# Patient Record
Sex: FEMALE | Race: BLACK OR AFRICAN AMERICAN | Employment: UNEMPLOYED | URBAN - METROPOLITAN AREA
[De-identification: names, ages, dates, MRNs, and addresses within clinical notes are randomized per-mention and may not be internally consistent; named-entity substitution may affect disease eponyms.]

---

## 2017-03-06 DIAGNOSIS — I10 HTN, GOAL BELOW 130/80: ICD-10-CM

## 2017-03-06 RX ORDER — METOPROLOL TARTRATE 25 MG/1
TABLET, FILM COATED ORAL
Qty: 180 TAB | Refills: 0 | Status: SHIPPED | OUTPATIENT
Start: 2017-03-06 | End: 2017-07-11 | Stop reason: SDUPTHER

## 2017-04-12 DIAGNOSIS — G57.93 NEUROPATHIC PAIN OF BOTH FEET: ICD-10-CM

## 2017-04-13 RX ORDER — GABAPENTIN 100 MG/1
100 CAPSULE ORAL 3 TIMES DAILY
Qty: 90 CAP | Refills: 2 | Status: SHIPPED | OUTPATIENT
Start: 2017-04-13 | End: 2019-08-14 | Stop reason: ALTCHOICE

## 2017-05-11 DIAGNOSIS — I10 ESSENTIAL HYPERTENSION, MALIGNANT: ICD-10-CM

## 2017-05-11 RX ORDER — LISINOPRIL AND HYDROCHLOROTHIAZIDE 20; 25 MG/1; MG/1
1 TABLET ORAL DAILY
Qty: 30 TAB | Refills: 0 | Status: SHIPPED | OUTPATIENT
Start: 2017-05-11 | End: 2019-07-16 | Stop reason: SDUPTHER

## 2017-07-11 DIAGNOSIS — I10 HTN, GOAL BELOW 130/80: ICD-10-CM

## 2017-07-11 NOTE — TELEPHONE ENCOUNTER
Pt would like a rx for metoprolol (LOPRESSOR) 25 mg tablet   Pt ph number is [phone]        [facility] [id number] IN [employer] - Sabina REMY

## 2017-07-12 RX ORDER — METOPROLOL TARTRATE 25 MG/1
TABLET, FILM COATED ORAL
Qty: 180 TAB | Refills: 0 | Status: SHIPPED | OUTPATIENT
Start: 2017-07-12 | End: 2017-10-12 | Stop reason: SDUPTHER

## 2017-09-14 DIAGNOSIS — Z86.73 S/P STROKE DUE TO CEREBROVASCULAR DISEASE: ICD-10-CM

## 2017-09-14 DIAGNOSIS — E78.00 HYPERCHOLESTEREMIA: ICD-10-CM

## 2017-09-14 DIAGNOSIS — I69.354 HEMIPARESIS AFFECTING LEFT SIDE AS LATE EFFECT OF CEREBROVASCULAR ACCIDENT (HCC): ICD-10-CM

## 2017-09-17 RX ORDER — SIMVASTATIN 10 MG/1
TABLET, FILM COATED ORAL
Qty: 30 TAB | Refills: 0 | Status: SHIPPED | OUTPATIENT
Start: 2017-09-17 | End: 2017-10-22 | Stop reason: SDUPTHER

## 2017-10-12 DIAGNOSIS — I10 HTN, GOAL BELOW 130/80: ICD-10-CM

## 2017-10-15 RX ORDER — METOPROLOL TARTRATE 25 MG/1
TABLET, FILM COATED ORAL
Qty: 60 TAB | Refills: 0 | Status: SHIPPED | OUTPATIENT
Start: 2017-10-15 | End: 2019-07-16 | Stop reason: SDUPTHER

## 2017-10-22 DIAGNOSIS — I69.354 HEMIPARESIS AFFECTING LEFT SIDE AS LATE EFFECT OF CEREBROVASCULAR ACCIDENT (HCC): ICD-10-CM

## 2017-10-22 DIAGNOSIS — E78.00 HYPERCHOLESTEREMIA: ICD-10-CM

## 2017-10-22 DIAGNOSIS — Z86.73 S/P STROKE DUE TO CEREBROVASCULAR DISEASE: ICD-10-CM

## 2017-10-24 RX ORDER — SIMVASTATIN 10 MG/1
TABLET, FILM COATED ORAL
Qty: 30 TAB | Refills: 0 | Status: SHIPPED | OUTPATIENT
Start: 2017-10-24 | End: 2019-07-16 | Stop reason: SDUPTHER

## 2017-12-20 DIAGNOSIS — I69.354 HEMIPARESIS AFFECTING LEFT SIDE AS LATE EFFECT OF CEREBROVASCULAR ACCIDENT (HCC): ICD-10-CM

## 2017-12-20 DIAGNOSIS — I10 HTN, GOAL BELOW 130/80: ICD-10-CM

## 2017-12-20 DIAGNOSIS — E78.00 HYPERCHOLESTEREMIA: ICD-10-CM

## 2017-12-20 DIAGNOSIS — Z86.73 S/P STROKE DUE TO CEREBROVASCULAR DISEASE: ICD-10-CM

## 2017-12-21 RX ORDER — CLONIDINE HYDROCHLORIDE 0.2 MG/1
TABLET ORAL
Qty: 90 TAB | Refills: 3 | Status: SHIPPED | OUTPATIENT
Start: 2017-12-21 | End: 2019-07-16 | Stop reason: SDUPTHER

## 2019-07-16 ENCOUNTER — OFFICE VISIT (OUTPATIENT)
Dept: FAMILY MEDICINE CLINIC | Age: 64
End: 2019-07-16

## 2019-07-16 VITALS
TEMPERATURE: 99.3 F | SYSTOLIC BLOOD PRESSURE: 186 MMHG | OXYGEN SATURATION: 97 % | HEART RATE: 100 BPM | DIASTOLIC BLOOD PRESSURE: 100 MMHG | RESPIRATION RATE: 20 BRPM | BODY MASS INDEX: 22.5 KG/M2 | HEIGHT: 60 IN | WEIGHT: 114.6 LBS

## 2019-07-16 DIAGNOSIS — I10 HTN, GOAL BELOW 140/90: ICD-10-CM

## 2019-07-16 DIAGNOSIS — I10 ESSENTIAL HYPERTENSION, MALIGNANT: ICD-10-CM

## 2019-07-16 DIAGNOSIS — E11.9 DIABETES MELLITUS TYPE 2, CONTROLLED (HCC): ICD-10-CM

## 2019-07-16 DIAGNOSIS — E78.00 HYPERCHOLESTEREMIA: ICD-10-CM

## 2019-07-16 DIAGNOSIS — Z12.39 SCREENING FOR BREAST CANCER: Primary | ICD-10-CM

## 2019-07-16 DIAGNOSIS — I63.89 CEREBROVASCULAR ACCIDENT (CVA) DUE TO OTHER MECHANISM (HCC): ICD-10-CM

## 2019-07-16 DIAGNOSIS — Z13.820 SCREENING FOR OSTEOPOROSIS: ICD-10-CM

## 2019-07-16 DIAGNOSIS — E11.40 TYPE 2 DIABETES MELLITUS WITH DIABETIC NEUROPATHY, WITHOUT LONG-TERM CURRENT USE OF INSULIN (HCC): ICD-10-CM

## 2019-07-16 DIAGNOSIS — I69.354 HEMIPARESIS AFFECTING LEFT SIDE AS LATE EFFECT OF CEREBROVASCULAR ACCIDENT (HCC): ICD-10-CM

## 2019-07-16 DIAGNOSIS — I10 HTN, GOAL BELOW 130/80: ICD-10-CM

## 2019-07-16 DIAGNOSIS — Z86.73 S/P STROKE DUE TO CEREBROVASCULAR DISEASE: ICD-10-CM

## 2019-07-16 PROBLEM — I63.9 STROKE (HCC): Status: ACTIVE | Noted: 2019-07-16

## 2019-07-16 LAB — HBA1C MFR BLD HPLC: 6.3 %

## 2019-07-16 RX ORDER — METOPROLOL TARTRATE 25 MG/1
TABLET, FILM COATED ORAL
Qty: 60 TAB | Refills: 0 | Status: SHIPPED | OUTPATIENT
Start: 2019-07-16 | End: 2019-08-13 | Stop reason: SDUPTHER

## 2019-07-16 RX ORDER — CLONIDINE HYDROCHLORIDE 0.2 MG/1
TABLET ORAL
Qty: 30 TAB | Refills: 1 | Status: SHIPPED | OUTPATIENT
Start: 2019-07-16 | End: 2019-07-23 | Stop reason: SDUPTHER

## 2019-07-16 RX ORDER — METFORMIN HYDROCHLORIDE 500 MG/1
250 TABLET ORAL 2 TIMES DAILY WITH MEALS
Qty: 90 TAB | Refills: 3 | Status: SHIPPED | OUTPATIENT
Start: 2019-07-16 | End: 2019-07-23 | Stop reason: SDUPTHER

## 2019-07-16 RX ORDER — LISINOPRIL AND HYDROCHLOROTHIAZIDE 20; 25 MG/1; MG/1
1 TABLET ORAL DAILY
Qty: 30 TAB | Refills: 0 | Status: SHIPPED | OUTPATIENT
Start: 2019-07-16 | End: 2019-07-23 | Stop reason: SDUPTHER

## 2019-07-16 RX ORDER — SIMVASTATIN 10 MG/1
TABLET, FILM COATED ORAL
Qty: 30 TAB | Refills: 0 | Status: SHIPPED | OUTPATIENT
Start: 2019-07-16 | End: 2019-07-23 | Stop reason: SDUPTHER

## 2019-07-16 NOTE — PROGRESS NOTES
HISTORY OF PRESENT ILLNESS  Logan Mcknight is a 66 y.o. female. HPI   Patient present with history of stroke couple years ago with left-sided hemiparesis secondary to cerebrovascular accident in the past diabetic state last A1c was controlled diabetic neuropathy with tingling sensation last visit was in 2016 patient states that she has not taken any of her medication she ran out and she has not been able to afford the cost currently on no medication whatsoever, patient also has had history of rheumatoid arthritis with bilateral hand deformity stating that she wants to see the same medical doctor that she sees the daughter pain is 4 out of 10 nonradiating constant and not getting better,. DMtype II  Patient also present for diabetic check,  the patient has not been compliancy w/ meds, patient also states that the pt is trying to have a diabetic diet, and eat less of carbohydrates, since last visit patient has been more active with daily walking,  +++ Rf needed for today. This time patient denies  tingling sensation, has no polyurea and polydipsia, last a1c was not at target of 6.6% %       HTN  Today pt present for Bp check and pt has no Compliancy w/ the bp meds, having had the low salt diet ,  has been active, patien does not obtain the bp at home ,, today the pt denies Chest Pain, has no legs swelling no lightheadedness,        Current Outpatient Medications   Medication Sig Dispense Refill    lisinopril-hydroCHLOROthiazide (PRINZIDE, ZESTORETIC) 20-25 mg per tablet Take 1 Tab by mouth daily. Patient requires an office visit for additional refills. 30 Tab 0    metFORMIN (GLUCOPHAGE) 500 mg tablet Take 0.5 Tabs by mouth two (2) times daily (with meals). 90 Tab 3    simvastatin (ZOCOR) 10 mg tablet TAKE 1 TABLET BY MOUTH EVERY NIGHT PATIENT REQUIRES AN OFFICE VISIT FOR ADDITIONAL REFILLS.  30 Tab 0    cloNIDine HCl (CATAPRES) 0.2 mg tablet TAKE 1 TABLET BY MOUTH EVERY NIGHT 30 Tab 1    metoprolol tartrate (LOPRESSOR) 25 mg tablet TAKE ONE TABLET BY MOUTH TWICE DAILY   Patient requires an office visit for additional refills. 60 Tab 0    gabapentin (NEURONTIN) 100 mg capsule Take 1 Cap by mouth three (3) times daily. Indications: NEUROPATHIC PAIN 90 Cap 02    aspirin 81 mg chewable tablet Take 1 Tab by mouth daily.  30 Tab 11     Allergies   Allergen Reactions    Codeine Nausea and Vomiting     Past Medical History:   Diagnosis Date    Arthritis     Diabetes mellitus ([facility] [address] 6/15/2015    Dyspepsia and other specified disorders of function of stomach     Foot pain, bilateral 6/15/2015    Hemiparesis affecting left side as late effect of cerebrovascular accident ([facility] [address] 4/18/2014    Hypercholesteremia 4/18/2014    Hypertension     Knee pain, bilateral 6/15/2015    Lipoma of back 5/14/2015    Nausea & vomiting     Prediabetes 5/14/2015    S/P stroke due to cerebrovascular disease 4/18/2014    Screening for breast cancer 9/15/2015    Stroke ([facility]     Stroke ([facility] 7/16/2019     Past Surgical History:   Procedure Laterality Date    ABDOMEN SURGERY PROC UNLISTED  2004    hernia repair    HX TUBAL LIGATION  1984     Family History   Problem Relation Age of Onset    Hypertension Mother     Diabetes Mother     Hypertension Father     Hypertension Brother     Stroke Brother      Social History     Tobacco Use    Smoking status: Former Smoker     Packs/day: 0.25     Years: 35.00     Pack years: 8.75     Last attempt to quit: 5/28/2009     Years since quitting: 10.1    Smokeless tobacco: Never Used   Substance Use Topics    Alcohol use: No     Comment: ETOH abuse in past; quit 2012      Lab Results   Component Value Date/Time    Hemoglobin A1c 6.6 (H) 09/15/2015 10:51 AM    Hemoglobin A1c 7.0 (H) 05/14/2015 03:44 PM    Hemoglobin A1c 6.3 (H) 05/22/2014 09:01 AM    Glucose 108 (H) 08/16/2016 10:40 AM    Glucose (POC) 112 (H) 06/04/2015 08:08 AM    Microalb/Creat ratio (ug/mg creat.) 21.2 08/19/2016 03:30 PM LDL, calculated 90 08/16/2016 10:40 AM    Creatinine 0.86 08/16/2016 10:40 AM      Lab Results   Component Value Date/Time    Cholesterol, total 164 08/16/2016 10:40 AM    HDL Cholesterol 59 08/16/2016 10:40 AM    LDL, calculated 90 08/16/2016 10:40 AM    Triglyceride 74 08/16/2016 10:40 AM     Lab Results   Component Value Date/Time    TSH 3.180 05/14/2015 03:44 PM         Review of Systems   Constitutional: Negative for chills and fever. HENT: Negative for congestion and nosebleeds. Eyes: Negative for blurred vision and pain. Respiratory: Negative for cough, shortness of breath and wheezing. Cardiovascular: Negative for chest pain and leg swelling. Gastrointestinal: Negative for constipation, diarrhea, nausea and vomiting. Genitourinary: Negative for dysuria and frequency. Musculoskeletal: Positive for joint pain and myalgias. Skin: Negative for itching and rash. Neurological: Negative for dizziness, loss of consciousness and headaches. Psychiatric/Behavioral: Negative for depression. The patient is not nervous/anxious and does not have insomnia. Physical Exam   Constitutional: She is oriented to person, place, and time. She appears well-developed and well-nourished. HENT:   Head: Normocephalic and atraumatic. Eyes: Pupils are equal, round, and reactive to light. Conjunctivae and EOM are normal.   Neck: No JVD present. No thyromegaly present. Cardiovascular: Normal rate, regular rhythm, normal heart sounds and intact distal pulses. Exam reveals no gallop and no friction rub. No murmur heard. Pulmonary/Chest: Effort normal and breath sounds normal. No stridor. No respiratory distress. She has no wheezes. She has no rales. Abdominal: Soft. Bowel sounds are normal. She exhibits no distension and no mass. There is no tenderness. Musculoskeletal: She exhibits tenderness and deformity. She exhibits no edema. Lymphadenopathy:     She has no cervical adenopathy.    Neurological: She is alert and oriented to person, place, and time. She has normal reflexes. No cranial nerve deficit. Skin: No rash noted. No erythema. Psychiatric: She has a normal mood and affect. Her behavior is normal.   Nursing note and vitals reviewed. ASSESSMENT and PLAN  Diagnoses and all orders for this visit:    1. Screening for breast cancer  -     BEV MAMMO BI SCREENING INCL CAD; Future    2. Screening for osteoporosis  -     DEXA BONE DENSITY STUDY AXIAL; Future    3. Type II diabetes mellitus with complication, uncontrolled (HCC)  -     AMB POC HEMOGLOBIN A1C  -     REFERRAL TO PODIATRY  -     LIPID PANEL  -     MICROALBUMIN, UR, RAND W/ MICROALB/CREAT RATIO  -     REFERRAL TO RHEUMATOLOGY  -     [facility] 3RD GENERATION  -     METABOLIC PANEL, COMPREHENSIVE  -     CBC W/O DIFF  -     MO HANDLG&/OR CONVEY OF SPEC FOR TR OFFICE TO LAB    4. HTN, goal below 140/90  -     REFERRAL TO RHEUMATOLOGY  -     [facility] 3RD GENERATION  -     METABOLIC PANEL, COMPREHENSIVE  -     CBC W/O DIFF  -     MO HANDLG&/OR CONVEY OF SPEC FOR TR OFFICE TO LAB    5. Type 2 diabetes mellitus with diabetic neuropathy, without long-term current use of insulin ([facility])  -     MO HANDLG&/OR CONVEY OF SPEC FOR TR OFFICE TO LAB    6. Essential hypertension, malignant  -     lisinopril-hydroCHLOROthiazide (PRINZIDE, ZESTORETIC) 20-25 mg per tablet; Take 1 Tab by mouth daily. Patient requires an office visit for additional refills.  -     REFERRAL TO RHEUMATOLOGY  -     [facility] 3RD GENERATION  -     METABOLIC PANEL, COMPREHENSIVE  -     CBC W/O DIFF  -     MO HANDLG&/OR CONVEY OF SPEC FOR TR OFFICE TO LAB    7. Diabetes mellitus type 2, controlled ([facility] [address]  -     metFORMIN (GLUCOPHAGE) 500 mg tablet; Take 0.5 Tabs by mouth two (2) times daily (with meals). -     REFERRAL TO RHEUMATOLOGY  -     [facility] 3RD GENERATION  -     METABOLIC PANEL, COMPREHENSIVE  -     CBC W/O DIFF  -     MO HANDLG&/OR CONVEY OF SPEC FOR TR OFFICE TO LAB    8.  Hypercholesteremia  - metFORMIN (GLUCOPHAGE) 500 mg tablet; Take 0.5 Tabs by mouth two (2) times daily (with meals). -     simvastatin (ZOCOR) 10 mg tablet; TAKE 1 TABLET BY MOUTH EVERY NIGHT PATIENT REQUIRES AN OFFICE VISIT FOR ADDITIONAL REFILLS. -     cloNIDine HCl (CATAPRES) 0.2 mg tablet; TAKE 1 TABLET BY MOUTH EVERY NIGHT  -     REFERRAL TO RHEUMATOLOGY  -     [facility] 3RD GENERATION  -     METABOLIC PANEL, COMPREHENSIVE  -     CBC W/O DIFF  -     MI HANDLG&/OR CONVEY OF SPEC FOR TR OFFICE TO LAB    9. Hemiparesis affecting left side as late effect of cerebrovascular accident ([facility]  -     simvastatin (ZOCOR) 10 mg tablet; TAKE 1 TABLET BY MOUTH EVERY NIGHT PATIENT REQUIRES AN OFFICE VISIT FOR ADDITIONAL REFILLS. -     cloNIDine HCl (CATAPRES) 0.2 mg tablet; TAKE 1 TABLET BY MOUTH EVERY NIGHT    10. S/P stroke due to cerebrovascular disease  -     simvastatin (ZOCOR) 10 mg tablet; TAKE 1 TABLET BY MOUTH EVERY NIGHT PATIENT REQUIRES AN OFFICE VISIT FOR ADDITIONAL REFILLS. -     cloNIDine HCl (CATAPRES) 0.2 mg tablet; TAKE 1 TABLET BY MOUTH EVERY NIGHT  -     MI HANDLG&/OR CONVEY OF SPEC FOR TR OFFICE TO LAB    11. HTN, goal below 130/80  -     cloNIDine HCl (CATAPRES) 0.2 mg tablet; TAKE 1 TABLET BY MOUTH EVERY NIGHT  -     metoprolol tartrate (LOPRESSOR) 25 mg tablet; TAKE ONE TABLET BY MOUTH TWICE DAILY   Patient requires an office visit for additional refills.   -     MI HANDLG&/OR CONVEY OF SPEC FOR TR OFFICE TO LAB    12. Cerebrovascular accident (CVA) due to other mechanism ([facility]    Secondary to patient history patient was given 0.1 mg of clonidine at this time for better outcome patient was told to continue with current medication as prescribed return to clinic in 1 week for blood pressure check a wellness visit patient acknowledged understanding and agreed with today's recommendations

## 2019-07-16 NOTE — PATIENT INSTRUCTIONS
Learning About Emotional Changes After a Stroke  Introduction  After a stroke, many people feel different without knowing why. For example, some people find it hard to control their emotions. They may cry or laugh for no reason. Or they may feel down or even hopeless. Some people may find they're acting differently. They may act too quickly or on impulse. Or they may be more anxious and hesitant at times. If these changes happen to you, they can be upsetting. And they can be confusing to you and your loved ones. But these changes may get better with time as your brain heals. Let your loved ones know what's happening. Their support and understanding can help you deal with these feelings. And with time and support from the people around you, you can learn ways to adjust to life after a stroke. How can a stroke affect your emotions? After a stroke, some people feel like they have lost control of their emotions. These feelings can come from one or both of these two causes:  · A stroke can affect parts of the brain that control how you feel. · A stroke can leave you with upsetting body changes that take away some of your independence. For example, some people may feel:  · Sad or angry about the loss of the lifestyle they had before. · Isolated by speech and language problems. · Frustrated by the slow pace of recovery. · Worried about the future. These feelings are normal and expected. These strong feelings are more likely to happen if you need to stay in bed for long periods of time. And it is more likely to be a problem at night. It may help to have a radio playing softly in the bedroom or a dim light beside the bed. How can you deal with your emotions after a stroke? To deal with your emotions:  · Be easy on yourself. Let go of mistakes. · Give yourself credit for the progress you have made. · Make time for things that you enjoy. · Join a stroke support group.  Your rehab team or local hospital can help you find one. Is depression common? It is common to feel sad about changes caused by the stroke. Sometimes the injury to the brain from the stroke can cause depression. If you think you might be depressed, tell your doctor right away. The sooner you know if you are depressed, the sooner you can get treatment. Treatment can help you feel better. Your doctor will want to know if in the past 2 weeks:  · You have lost interest or pleasure in doing things. · You have been feeling sad, hopeless, or empty. Your doctor may also ask about sleep troubles or changes in eating. How can friends and family help? Your loved ones can help you by following these tips:  · A person who has had a stroke may tend to have strong emotional reactions. Remember that these are a result of the stroke. Try not to become too upset by them. · Don't avoid a loved one who's had a stroke. Contact with and support from family members is very important to recovery. · Watch for signs of depression in people who have had a stroke. Urge them to talk to their doctor if they think they might be depressed. Follow-up care is a key part of your treatment and safety. Be sure to make and go to all appointments, and call your doctor if you are having problems. It's also a good idea to know your test results and keep a list of the medicines you take. Where can you learn more? Go to http://nikos-patrizia.info/. Enter 427 0114 in the search box to learn more about \"Learning About Emotional Changes After a Stroke. \"  Current as of: September 26, 2018  Content Version: 11.9  © 7141-4996 [employer]. Care instructions adapted under license by [employer] (which disclaims liability or warranty for this information).  If you have questions about a medical condition or this instruction, always ask your healthcare professional. [address] any warranty or liability for your use of this information. Diabetes and Preventing Falls: Care Instructions  Your Care Instructions    If you are an older adult who has diabetes, you may have a higher risk of falling. Complications of diabetes--such as nerve damage, foot problems, and reduced vision--may increase your risk of a fall. Some of your medicines also may add to your risk. By making your home safer, you can lower your risk of falling. Doing things to prevent diabetes complications may also help to lower your risk. You can make your home safer with a few simple measures. Follow-up care is a key part of your treatment and safety. Be sure to make and go to all appointments, and call your doctor if you are having problems. It's also a good idea to know your test results and keep a list of the medicines you take. How can you care for yourself at home? Taking care of yourself  · Keep your blood sugar at a target level (which you set with your doctor). · Exercise regularly to improve your strength, muscle tone, and balance. Walk if you can. Swimming may be a good choice if you cannot walk easily. · Have your vision checked as often as your doctor recommends. It is usually once a year or more often if you have eye problems. · Know the side effects of the medicines you take. Ask your doctor or pharmacist whether the medicines you take can affect your balance. Sleeping pills or sedatives can affect your balance. · Limit the amount of alcohol you drink. Alcohol can impair your balance and other senses. · Have your doctor check your feet during each visit. If you have a foot problem, see your doctor. Preventing falls at home  · Remove raised doorway thresholds, throw rugs, and clutter. Repair loose carpet or raised areas in the floor. · Move furniture and electrical cords to keep them out of walking paths. · Use nonskid floor wax, and wipe up spills right away, especially on ceramic tile floors.   · If you use a walker or cane, put rubber tips on it. If you use crutches, clean the bottoms of them regularly with an abrasive pad, such as steel wool. · Keep your house well lit, especially Daniela Golas, and outside walkways. Use night-lights in areas such as hallways and bathrooms. Add extra light switches or use remote switches (such as switches that go on or off when you clap your hands) to make it easier to turn lights on if you have to get up during the night. · Install sturdy handrails on stairways. Put grab bars near your shower, bathtub, and toilet. · Store household items on low shelves so that you do not have to climb or reach high. Or use a reaching device that you can get at a medical supply store. If you have to climb for something, use a step stool with handrails, or ask someone to get it for you. · Keep a cordless phone and a flashlight with new batteries by your bed. If possible, put a phone in each of the main rooms of your house, or carry a cell phone in case you fall and cannot reach a phone. Or you can wear a device around your neck or wrist. You push a button that sends a signal for help. · Wear low-heeled shoes that fit well and give your feet good support. Use footwear with nonskid soles. Check the heels and soles of your shoes for wear. Repair or replace worn heels or soles. · Do not wear socks without shoes on wood floors. · Walk on the grass when the sidewalks are slippery. If you live in an area that gets snow and ice in the winter, sprinkle salt on slippery steps and sidewalks. Where can you learn more? Go to http://nikos-patrizia.info/. Enter Y295 in the search box to learn more about \"Diabetes and Preventing Falls: Care Instructions. \"  Current as of: March 15, 2018  Content Version: 11.9  © 3959-0552 [employer]. Care instructions adapted under license by [employer] (which disclaims liability or warranty for this information).  If you have questions about a medical condition or this instruction, always ask your healthcare professional. [address] any warranty or liability for your use of this information. DASH Diet: Care Instructions  Your Care Instructions    The DASH diet is an eating plan that can help lower your blood pressure. DASH stands for Dietary Approaches to Stop Hypertension. Hypertension is high blood pressure. The DASH diet focuses on eating foods that are high in calcium, potassium, and magnesium. These nutrients can lower blood pressure. The foods that are highest in these nutrients are fruits, vegetables, low-fat dairy products, nuts, seeds, and legumes. But taking calcium, potassium, and magnesium supplements instead of eating foods that are high in those nutrients does not have the same effect. The DASH diet also includes whole grains, fish, and poultry. The DASH diet is one of several lifestyle changes your doctor may recommend to lower your high blood pressure. Your doctor may also want you to decrease the amount of sodium in your diet. Lowering sodium while following the DASH diet can lower blood pressure even further than just the DASH diet alone. Follow-up care is a key part of your treatment and safety. Be sure to make and go to all appointments, and call your doctor if you are having problems. It's also a good idea to know your test results and keep a list of the medicines you take. How can you care for yourself at home? Following the DASH diet  · Eat 4 to 5 servings of fruit each day. A serving is 1 medium-sized piece of fruit, ½ cup chopped or canned fruit, 1/4 cup dried fruit, or 4 ounces (½ cup) of fruit juice. Choose fruit more often than fruit juice. · Eat 4 to 5 servings of vegetables each day. A serving is 1 cup of lettuce or raw leafy vegetables, ½ cup of chopped or cooked vegetables, or 4 ounces (½ cup) of vegetable juice. Choose vegetables more often than vegetable juice.   · Get 2 to 3 servings of low-fat and fat-free dairy each day. A serving is 8 ounces of milk, 1 cup of yogurt, or 1 ½ ounces of cheese. · Eat 6 to 8 servings of grains each day. A serving is 1 slice of bread, 1 ounce of dry cereal, or ½ cup of cooked rice, pasta, or cooked cereal. Try to choose whole-grain products as much as possible. · Limit lean meat, poultry, and fish to 2 servings each day. A serving is 3 ounces, about the size of a deck of cards. · Eat 4 to 5 servings of nuts, seeds, and legumes (cooked dried beans, lentils, and split peas) each week. A serving is 1/3 cup of nuts, 2 tablespoons of seeds, or ½ cup of cooked beans or peas. · Limit fats and oils to 2 to 3 servings each day. A serving is 1 teaspoon of vegetable oil or 2 tablespoons of salad dressing. · Limit sweets and added sugars to 5 servings or less a week. A serving is 1 tablespoon jelly or jam, ½ cup sorbet, or 1 cup of lemonade. · Eat less than 2,300 milligrams (mg) of sodium a day. If you limit your sodium to 1,500 mg a day, you can lower your blood pressure even more. Tips for success  · Start small. Do not try to make dramatic changes to your diet all at once. You might feel that you are missing out on your favorite foods and then be more likely to not follow the plan. Make small changes, and stick with them. Once those changes become habit, add a few more changes. · Try some of the following:  ? Make it a goal to eat a fruit or vegetable at every meal and at snacks. This will make it easy to get the recommended amount of fruits and vegetables each day. ? Try yogurt topped with fruit and nuts for a snack or healthy dessert. ? Add lettuce, tomato, cucumber, and onion to sandwiches. ? Combine a ready-made pizza crust with low-fat mozzarella cheese and lots of vegetable toppings. Try using tomatoes, squash, spinach, broccoli, carrots, cauliflower, and onions. ?  Have a variety of cut-up vegetables with a low-fat dip as an appetizer instead of chips and dip.  ? Sprinkle sunflower seeds or chopped almonds over salads. Or try adding chopped walnuts or almonds to cooked vegetables. ? Try some vegetarian meals using beans and peas. Add garbanzo or kidney beans to salads. Make burritos and tacos with mashed moore beans or black beans. Where can you learn more? Go to http://nikos-patrizia.info/. Enter D243 in the search box to learn more about \"DASH Diet: Care Instructions. \"  Current as of: July 22, 2018  Content Version: 11.9  © 1029-8711 [employer] [place], [email or web address]. Care instructions adapted under license by [employer] (which disclaims liability or warranty for this information). If you have questions about a medical condition or this instruction, always ask your healthcare professional. [address] any warranty or liability for your use of this information.

## 2019-07-16 NOTE — PROGRESS NOTES
Name and  verified        Chief Complaint   Patient presents with    Arthritis     Both hands/knees    Hypertension    Diabetes       Patient declined vaccine. Patient stated has not been to see provider due to finances and she has not taking any medications in over one year. Patient educated on the parts of a diabetes care plan  1. Meal plan  2. Plan for staying active  3. Diabetes medicine plan  4. Plan for checking blood sugar as ordered by Dr. Partha Hanna  5.  Schedule for diabetes follow up appt as recommended by provider

## 2019-07-18 LAB
ALBUMIN SERPL-MCNC: 4.1 G/DL (ref 3.6–4.8)
ALBUMIN/CREAT UR: 96.3 MG/G CREAT (ref 0–30)
ALBUMIN/GLOB SERPL: 1.3 {RATIO} (ref 1.2–2.2)
ALP SERPL-CCNC: 116 IU/L (ref 39–117)
ALT SERPL-CCNC: 17 IU/L (ref 0–32)
AST SERPL-CCNC: 18 IU/L (ref 0–40)
BILIRUB SERPL-MCNC: 0.3 MG/DL (ref 0–1.2)
BUN SERPL-MCNC: 15 MG/DL (ref 8–27)
BUN/CREAT SERPL: 26 (ref 12–28)
CALCIUM SERPL-MCNC: 9.1 MG/DL (ref 8.7–10.3)
CHLORIDE SERPL-SCNC: 103 MMOL/L (ref 96–106)
CHOLEST SERPL-MCNC: 195 MG/DL (ref 100–199)
CO2 SERPL-SCNC: 22 MMOL/L (ref 20–29)
CREAT SERPL-MCNC: 0.57 MG/DL (ref 0.57–1)
CREAT UR-MCNC: 131.6 MG/DL
ERYTHROCYTE [DISTWIDTH] IN BLOOD BY AUTOMATED COUNT: 17.9 % (ref 12.3–15.4)
GLOBULIN SER CALC-MCNC: 3.1 G/DL (ref 1.5–4.5)
GLUCOSE SERPL-MCNC: 99 MG/DL (ref 65–99)
HCT VFR BLD AUTO: 36.2 % (ref 34–46.6)
HDLC SERPL-MCNC: 61 MG/DL
HGB BLD-MCNC: 11.6 G/DL (ref 11.1–15.9)
LDLC SERPL CALC-MCNC: 117 MG/DL (ref 0–99)
Lab: NORMAL
MCH RBC QN AUTO: 27.2 PG (ref 26.6–33)
MCHC RBC AUTO-ENTMCNC: 32 G/DL (ref 31.5–35.7)
MCV RBC AUTO: 85 FL (ref 79–97)
MICROALBUMIN UR-MCNC: 126.7 UG/ML
PLATELET # BLD AUTO: 422 X10E3/UL (ref 150–450)
POTASSIUM SERPL-SCNC: 4.1 MMOL/L (ref 3.5–5.2)
PROT SERPL-MCNC: 7.2 G/DL (ref 6–8.5)
RBC # BLD AUTO: 4.27 X10E6/UL (ref 3.77–5.28)
SODIUM SERPL-SCNC: 141 MMOL/L (ref 134–144)
TRIGL SERPL-MCNC: 84 MG/DL (ref 0–149)
TSH SERPL DL<=0.005 MIU/L-ACNC: 1.54 UIU/ML (ref 0.45–4.5)
VLDLC SERPL CALC-MCNC: 17 MG/DL (ref 5–40)
WBC # BLD AUTO: 7.6 X10E3/UL (ref 3.4–10.8)

## 2019-07-23 ENCOUNTER — OFFICE VISIT (OUTPATIENT)
Dept: FAMILY MEDICINE CLINIC | Age: 64
End: 2019-07-23

## 2019-07-23 VITALS
OXYGEN SATURATION: 97 % | TEMPERATURE: 98.2 F | RESPIRATION RATE: 18 BRPM | HEIGHT: 60 IN | DIASTOLIC BLOOD PRESSURE: 90 MMHG | HEART RATE: 82 BPM | SYSTOLIC BLOOD PRESSURE: 134 MMHG | WEIGHT: 113.9 LBS | BODY MASS INDEX: 22.36 KG/M2

## 2019-07-23 DIAGNOSIS — E11.9 DIABETES MELLITUS TYPE 2, CONTROLLED (HCC): ICD-10-CM

## 2019-07-23 DIAGNOSIS — Z12.31 ENCOUNTER FOR SCREENING MAMMOGRAM FOR MALIGNANT NEOPLASM OF BREAST: ICD-10-CM

## 2019-07-23 DIAGNOSIS — Z13.31 SCREENING FOR DEPRESSION: ICD-10-CM

## 2019-07-23 DIAGNOSIS — Z23 ENCOUNTER FOR IMMUNIZATION: ICD-10-CM

## 2019-07-23 DIAGNOSIS — E78.00 HYPERCHOLESTEREMIA: ICD-10-CM

## 2019-07-23 DIAGNOSIS — Z00.00 WELLNESS EXAMINATION: Primary | ICD-10-CM

## 2019-07-23 DIAGNOSIS — I69.354 HEMIPARESIS AFFECTING LEFT SIDE AS LATE EFFECT OF CEREBROVASCULAR ACCIDENT (HCC): ICD-10-CM

## 2019-07-23 DIAGNOSIS — Z86.73 S/P STROKE DUE TO CEREBROVASCULAR DISEASE: ICD-10-CM

## 2019-07-23 DIAGNOSIS — I10 ESSENTIAL HYPERTENSION, MALIGNANT: ICD-10-CM

## 2019-07-23 DIAGNOSIS — Z71.89 ADVANCED DIRECTIVES, COUNSELING/DISCUSSION: ICD-10-CM

## 2019-07-23 DIAGNOSIS — Z12.11 SCREEN FOR COLON CANCER: ICD-10-CM

## 2019-07-23 DIAGNOSIS — E11.21 TYPE II DIABETES MELLITUS WITH NEPHROPATHY (HCC): ICD-10-CM

## 2019-07-23 DIAGNOSIS — M89.9 DISORDER OF BONE AND CARTILAGE: ICD-10-CM

## 2019-07-23 DIAGNOSIS — M94.9 DISORDER OF BONE AND CARTILAGE: ICD-10-CM

## 2019-07-23 DIAGNOSIS — Z13.39 SCREENING FOR ALCOHOLISM: ICD-10-CM

## 2019-07-23 RX ORDER — SIMVASTATIN 10 MG/1
TABLET, FILM COATED ORAL
Qty: 90 TAB | Refills: 1 | Status: SHIPPED | OUTPATIENT
Start: 2019-07-23 | End: 2019-12-13 | Stop reason: SDUPTHER

## 2019-07-23 RX ORDER — CLONIDINE HYDROCHLORIDE 0.2 MG/1
TABLET ORAL
Qty: 90 TAB | Refills: 1 | Status: SHIPPED | OUTPATIENT
Start: 2019-07-23 | End: 2019-11-07 | Stop reason: SDUPTHER

## 2019-07-23 RX ORDER — LISINOPRIL AND HYDROCHLOROTHIAZIDE 20; 25 MG/1; MG/1
1 TABLET ORAL DAILY
Qty: 90 TAB | Refills: 1 | Status: SHIPPED | OUTPATIENT
Start: 2019-07-23 | End: 2019-11-07 | Stop reason: SDUPTHER

## 2019-07-23 RX ORDER — METFORMIN HYDROCHLORIDE 500 MG/1
250 TABLET ORAL 2 TIMES DAILY WITH MEALS
Qty: 90 TAB | Refills: 3
Start: 2019-07-23 | End: 2019-11-07 | Stop reason: SDUPTHER

## 2019-07-23 NOTE — PROGRESS NOTES
Name and  verified      Chief Complaint   Patient presents with    Results         1. Have you been to the ER, urgent care clinic since your last visit? Hospitalized since your last visit? no    2. Have you seen or consulted any other health care providers outside of the [address] [place] [address] since your last visit? Include any pap smears or colon screening.  no

## 2019-07-23 NOTE — PROGRESS NOTES
This is an Initial Medicare Annual Wellness Exam (AWV) (Performed 12 months after IPPE or effective date of Medicare Part B enrollment, Once in a lifetime)    I have reviewed the patient's medical history in detail and updated the computerized patient record. History     Her gyne and breast care is done elsewhere by her Ob-Gyne physician. Last wellness exam was few years ago  patient is currently up todate w/ all vaccination, last tetanus vaccine was 2015,  patient has had hx of fall secondary to stroke not feeling depressed, no blood in the stool no tarry stool, still driving at this time,      Medications causing fall and the risk for future fall detailed for the pt today the depression at this age addressed pt with improvig interests and enjoy to do things, not anxious not depressed,  pt at this visit, is physically functional with gait  and nicely independent and walks w/out mobility device ,  mentaly is very functional,  very Alert and oriented, unfortunately,  BMI for pt's age is into nl state,    the  [facility] BMI r/w'd and information given,  ,,     last mammog was never,  last pap smear normal and was in a few years ago, refused to get one done today  . last colonoscopy was never,   No family hx of breast cancer       no family hx of colon cancer, patient is stating that the pt is trying to be sexaully active,  ++physically active,  compliant w/ meds, ++Rf needed for today for currentt meds.               Past Medical History:   Diagnosis Date    Arthritis     Diabetes mellitus ([facility] [address] 6/15/2015    Dyspepsia and other specified disorders of function of stomach     Foot pain, bilateral 6/15/2015    Hemiparesis affecting left side as late effect of cerebrovascular accident ([address] 4/18/2014    Hypercholesteremia 4/18/2014    Hypertension     Knee pain, bilateral 6/15/2015    Lipoma of back 5/14/2015    Nausea & vomiting     Prediabetes 5/14/2015    S/P stroke due to cerebrovascular disease 4/18/2014    Screening for breast cancer 9/15/2015    Stroke [facility])     Stroke ([facility] 7/16/2019      Past Surgical History:   Procedure Laterality Date    ABDOMEN SURGERY PROC UNLISTED  2004    hernia repair    HX TUBAL LIGATION  1984     Current Outpatient Medications   Medication Sig Dispense Refill    lisinopril-hydroCHLOROthiazide (PRINZIDE, ZESTORETIC) 20-25 mg per tablet Take 1 Tab by mouth daily. Patient requires an office visit for additional refills. 90 Tab 1    metFORMIN (GLUCOPHAGE) 500 mg tablet Take 0.5 Tabs by mouth two (2) times daily (with meals). 90 Tab 3    simvastatin (ZOCOR) 10 mg tablet TAKE 1 TABLET BY MOUTH EVERY NIGHT PATIENT REQUIRES AN OFFICE VISIT FOR ADDITIONAL REFILLS. 90 Tab 1    cloNIDine HCl (CATAPRES) 0.2 mg tablet TAKE 1 TABLET BY MOUTH EVERY NIGHT 90 Tab 1    metoprolol tartrate (LOPRESSOR) 25 mg tablet TAKE ONE TABLET BY MOUTH TWICE DAILY   Patient requires an office visit for additional refills. 60 Tab 0    gabapentin (NEURONTIN) 100 mg capsule Take 1 Cap by mouth three (3) times daily. Indications: NEUROPATHIC PAIN 90 Cap 02    aspirin 81 mg chewable tablet Take 1 Tab by mouth daily.  30 Tab 11     Allergies   Allergen Reactions    Codeine Nausea and Vomiting     Family History   Problem Relation Age of Onset    Hypertension Mother     Diabetes Mother     Hypertension Father     Hypertension Brother     Stroke Brother      Social History     Tobacco Use    Smoking status: Former Smoker     Packs/day: 0.25     Years: 35.00     Pack years: 8.75     Last attempt to quit: 5/28/2009     Years since quitting: 10.1    Smokeless tobacco: Never Used   Substance Use Topics    Alcohol use: No     Comment: ETOH abuse in past; quit 2012     Patient Active Problem List   Diagnosis Code    HTN, goal below 130/80 I10    Hemiparesis affecting left side as late effect of cerebrovascular accident ([facility] [address] N30.665    Hypercholesteremia E78.00    S/P stroke due to cerebrovascular disease Z86.73    Prediabetes R73.03    Lipoma of back D17.1    Non compliance w medication regimen Z91.14    Diabetes mellitus type 2, controlled ([facility] E11.9    Knee pain, bilateral M25.561, M25.562    Neuropathic pain of both feet G57.93    Screening for breast cancer Z12.31    Type 2 diabetes mellitus with diabetic neuropathy ([facility] [address] E11.40    Stroke ([facility] I63.9       Depression Risk Factor Screening:     3 most recent PHQ Screens 7/23/2019   Little interest or pleasure in doing things Not at all   Feeling down, depressed, irritable, or hopeless Not at all   Total Score PHQ 2 0     Alcohol Risk Factor Screening: You do not drink alcohol or very rarely. Functional Ability and Level of Safety:     Hearing Loss  Hearing is good. Activities of Daily Living  The home contains: handrails, grab bars and rugs  Patient needs help with:  transportation and shopping    Fall Risk  No flowsheet data found. Abuse Screen  Patient is not abused    Cognitive Screening   Evaluation of Cognitive Function:  Has your family/caregiver stated any concerns about your memory: no  Normal    Patient Care Team   Patient Care Team:  Daniel Cornelius MD as PCP - General (Family Practice)    Assessment/Plan   Education and counseling provided:  Are appropriate based on today's review and evaluation  End-of-Life planning (with patient's consent)  Pneumococcal Vaccine  Screening Mammography  Screening Pap and pelvic (covered once every 2 years)  Colorectal cancer screening tests  Cardiovascular screening blood test  Diabetes outpatient self-management training services    Diagnoses and all orders for this visit:    1. Wellness examination    2. Type II diabetes mellitus with nephropathy ([facility]    3. Essential hypertension, malignant  -     lisinopril-hydroCHLOROthiazide (PRINZIDE, ZESTORETIC) 20-25 mg per tablet; Take 1 Tab by mouth daily. Patient requires an office visit for additional refills.     4. Diabetes mellitus type 2, controlled ([facility]  -     metFORMIN (GLUCOPHAGE) 500 mg tablet; Take 0.5 Tabs by mouth two (2) times daily (with meals). 5. Hypercholesteremia  -     metFORMIN (GLUCOPHAGE) 500 mg tablet; Take 0.5 Tabs by mouth two (2) times daily (with meals). -     simvastatin (ZOCOR) 10 mg tablet; TAKE 1 TABLET BY MOUTH EVERY NIGHT PATIENT REQUIRES AN OFFICE VISIT FOR ADDITIONAL REFILLS. -     cloNIDine HCl (CATAPRES) 0.2 mg tablet; TAKE 1 TABLET BY MOUTH EVERY NIGHT    6. Hemiparesis affecting left side as late effect of cerebrovascular accident ([facility]  -     simvastatin (ZOCOR) 10 mg tablet; TAKE 1 TABLET BY MOUTH EVERY NIGHT PATIENT REQUIRES AN OFFICE VISIT FOR ADDITIONAL REFILLS. -     cloNIDine HCl (CATAPRES) 0.2 mg tablet; TAKE 1 TABLET BY MOUTH EVERY NIGHT    7. S/P stroke due to cerebrovascular disease  -     simvastatin (ZOCOR) 10 mg tablet; TAKE 1 TABLET BY MOUTH EVERY NIGHT PATIENT REQUIRES AN OFFICE VISIT FOR ADDITIONAL REFILLS. -     cloNIDine HCl (CATAPRES) 0.2 mg tablet; TAKE 1 TABLET BY MOUTH EVERY NIGHT    8. Advanced directives, counseling/discussion  -     ADVANCE CARE PLANNING FIRST 30 MINS  -     FULL CODE    9. Screening for alcoholism  -     MI ANNUAL ALCOHOL SCREEN 15 MIN    10. Screening for depression  -     DEPRESSION SCREEN ANNUAL    11. Screen for colon cancer  -     REFERRAL TO GASTROENTEROLOGY    12. Encounter for screening mammogram for malignant neoplasm of breast  -     BEV MAMMO BI SCREENING INCL CAD; Future    13. Disorder of bone and cartilage  -     DEXA BONE DENSITY STUDY AXIAL; Future    14. Encounter for immunization  -     ADMIN PNEUMOCOCCAL VACCINE  -     PNEUMOCOCCAL POLYSACCHARIDE VACCINE, 23-VALENT, ADULT OR IMMUNOSUPPRESSED PT DOSE,        [facility] education and counseling provided:  Age appropriate evidence-based preventive care recommendations based on today's review and evaluation; including relevant cancer screening guidelines, and vaccination recommendations.   An After Visit Summary was printed and given to the patient with information about these guidelines, and a personalized schedule for health maintenance items. When appropriate and with patient agreement, orders noted below were placed to complete missing health maintenance items.        Health Maintenance Due   Topic Date Due    EYE EXAM RETINAL OR DILATED  07[birth year]17/1965    Shingrix Vaccine Age 50> (1 of 2) 07/17/2007    BREAST CANCER SCRN MAMMOGRAM  07/17/2007    COLON CANCER SCRN (BARIUM / CT COLO / FLX SIG) Q5Y  07/17/2007    PAP AKA CERVICAL CYTOLOGY  05/22/2017    FOOT EXAM Q1  08/16/2017

## 2019-07-23 NOTE — PATIENT INSTRUCTIONS
Medicare Wellness Visit, Female     The best way to live healthy is to have a lifestyle where you eat a well-balanced diet, exercise regularly, limit alcohol use, and quit all forms of tobacco/nicotine, if applicable. Regular preventive services are another way to keep healthy. Preventive services (vaccines, screening tests, monitoring & exams) can help personalize your care plan, which helps you manage your own care. Screening tests can find health problems at the earliest stages, when they are easiest to treat. [facility] follows the current, evidence-based guidelines published by the [facility] ([facility]) when recommending preventive services for our patients. Because we follow these guidelines, sometimes recommendations change over time as research supports it. (For example, mammograms used to be recommended annually. Even though Medicare will still pay for an annual mammogram, the newer guidelines recommend a mammogram every two years for women of average risk.)  Of course, you and your doctor may decide to screen more often for some diseases, based on your risk and your health status. Preventive services for you include:  - Medicare offers their members a free annual wellness visit, which is time for you and your primary care provider to discuss and plan for your preventive service needs. Take advantage of this benefit every year!  -All adults over the age of 72 should receive the recommended pneumonia vaccines. Current USPSTF guidelines recommend a series of two vaccines for the best pneumonia protection.   -All adults should have a flu vaccine yearly and a tetanus vaccine every 10 years. All adults age 61 and older should receive a shingles vaccine once in their lifetime.    -A bone mass density test is recommended when a woman turns 65 to screen for osteoporosis. This test is only recommended one time, as a screening.  Some providers will use this same test as a disease monitoring tool if you already have osteoporosis. -All adults age 38-68 who are overweight should have a diabetes screening test once every three years.   -Other screening tests and preventive services for persons with diabetes include: an eye exam to screen for diabetic retinopathy, a kidney function test, a foot exam, and stricter control over your cholesterol.   -Cardiovascular screening for adults with routine risk involves an electrocardiogram (ECG) at intervals determined by your doctor.   -Colorectal cancer screenings should be done for adults age 54-65 with no increased risk factors for colorectal cancer. There are a number of acceptable methods of screening for this type of cancer. Each test has its own benefits and drawbacks. Discuss with your doctor what is most appropriate for you during your annual wellness visit. The different tests include: colonoscopy (considered the best screening method), a fecal occult blood test, a fecal DNA test, and sigmoidoscopy. -Breast cancer screenings are recommended every other year for women of normal risk, age 54-69.  -Cervical cancer screenings for women over age 72 are only recommended with certain risk factors.   -All adults born between [facility] should be screened once for Hepatitis C. Here is a list of your current Health Maintenance items (your personalized list of preventive services) with a due date:  Health Maintenance Due   Topic Date Due    Eye Exam  07/17/1967    Shingles Vaccine (1 of 2) 07/17/2007    Mammogram  07/17/2007    Colon Cancer Screening  07/17/2007    Pap Test  05/22/2017    Diabetic Foot Care  08/16/2017              Learning About Cervical Cancer Screening  What is a cervical cancer screening test?    The cervix is the lower part of the uterus that opens into the vagina. Cervical cancer screening tests check the cells on the cervix for changes that could lead to cancer.   Two tests can be used to screen for cervical cancer. They may be used alone or together. A Pap test.   This test looks for changes in the cells of the cervix. Some of these cell changes could lead to cancer. A human papillomavirus (HPV) test.   This test looks for the HPV virus. Some high-risk types of HPV can cause cell changes that could lead to cervical cancer. During either test, the doctor or nurse will insert a tool called a speculum into your vagina. The speculum gently opens the vaginal walls. It allows your doctor to see inside the vagina and the cervix. He or she uses a small swab or brush to collect cell samples from your cervix. Try to schedule the test when you're not having your period. To get ready for the test, your doctor may ask you to use a condom if you have sex before the test. Your doctor may also say to avoid douches, tampons, vaginal medicines, sprays, and powders for at least a day before you have the test.  When should you have a screening test?  Talk with your doctor about cervical cancer screening. If you are a woman with an average risk for cervical cancer, your doctor will likely suggest starting screening at age 24. Women 21 to 34  If you are in this age group, your doctor will likely suggest that you get a Pap test. If your Pap test results are normal, you can wait 3 years to have another test.  Women 27 to 59  Screening options for women in this age group may include:  · A Pap test. If your results are normal, you can wait 3 years to have another test.  · An HPV test. If your results are normal, you can wait 5 years to have another test.  · A Pap test and an HPV test. Having both tests is called co-testing. If your results are normal, you can wait 5 years to be tested again. Women 72 and older  · If you are age 72 or older, talk with your doctor about what's right for you. Women ages 72 and older may no longer need to be screened for cervical cancer.  When to stop having screening tests depends on your medical history, your overall health, and your risk of cervical cell changes or cervical cancer. What happens after the test?  The sample of cells taken during your test will be sent to a lab so that an expert can look at the cells. It usually takes a week or two to get the results back. Pap tests  · A normal result means that the test didn't find any abnormal cells in the sample. · An abnormal result can mean many things. Most of these aren't cancer. The results of your test may be abnormal because:  ? You have an infection of the vagina or cervix, such as a yeast infection. ? You have low estrogen levels after menopause that are causing the cells to change. ? You have cell changes that may be a sign of precancer or cancer. The results are ranked based on how serious the changes might be. HPV tests  · A negative result means that the test didn't find any high-risk HPV in the sample. · A positive HPV test means that at least one type of high-risk HPV was found. It doesn't mean that you have cervical cancer, but it increases your chance of having cell changes that could lead to cancer. Follow-up care is a key part of your treatment and safety. Be sure to make and go to all appointments, and call your doctor if you are having problems. It's also a good idea to know your test results and keep a list of the medicines you take. Where can you learn more? Go to http://nikos-patrizia.info/. Enter P919 in the search box to learn more about \"Learning About Cervical Cancer Screening. \"  Current as of: December 19, 2018  Content Version: 12.1  © 1774-1925 [employer]. Care instructions adapted under license by [employer] (which disclaims liability or warranty for this information). If you have questions about a medical condition or this instruction, always ask your healthcare professional. [address] any warranty or liability for your use of this information. Colon Cancer Screening: Care Instructions  Your Care Instructions    Colorectal cancer occurs in the colon or rectum. That's the lower part of your digestive system. It is the second-leading cause of cancer deaths in the [place]. It often starts with small growths called polyps in the colon or rectum. Polyps are usually found with screening tests. Depending on the type of test, any polyps found may be removed during the tests. Colorectal cancer usually does not cause symptoms at first. But regular tests can help find it early, before it spreads and becomes harder to treat. Experts advise routine tests for colon cancer for people starting at age 48. And they advise people with a higher risk of colon cancer to get tested sooner. Talk with your doctor about when you should start testing. Discuss which tests you need. Follow-up care is a key part of your treatment and safety. Be sure to make and go to all appointments, and call your doctor if you are having problems. It's also a good idea to know your test results and keep a list of the medicines you take. What are the main screening tests for colon cancer? · Stool tests. These include the fecal immunochemical test (FIT) and the fecal occult blood test (FOBT). These tests check stool samples for signs of cancer. If your test is positive, you will need to have a colonoscopy. · Sigmoidoscopy. This test lets your doctor look at the lining of your rectum and the lowest part of your colon. Your doctor uses a lighted tube called a sigmoidoscope. This test can't find cancers or polyps in the upper part of your colon. In some cases, polyps that are found can be removed. But if your doctor finds polyps, you will need to have a colonoscopy to check the upper part of your colon. · Colonoscopy. This test lets your doctor look at the lining of your rectum and your entire colon. The doctor uses a thin, flexible tool called a colonoscope.  It can also be used to remove polyps or get a tissue sample (biopsy). What tests do you need? The following guidelines are for people age 48 and over who are not at high risk for colorectal cancer. You may have at least one of these tests as directed by your doctor. · Fecal immunochemical test (FIT) or fecal occult blood test (FOBT) every year  · Sigmoidoscopy every 5 years  · Colonoscopy every 10 years  If you are age 68 to 80, you can work with your doctor to decide if screening is a good option. If you are age 80 or older, your doctor will likely advise that screening is not helpful. Talk with your doctor about when you need to be tested. And discuss which tests are right for you. Your doctor may recommend earlier or more frequent testing if you:  · Have had colorectal cancer before. · Have had colon polyps. · Have symptoms of colorectal cancer. These include blood in your stool and changes in your bowel habits. · Have a parent, brother or sister, or child with colon polyps or colorectal cancer. · Have a bowel disease. This includes ulcerative colitis and Crohn's disease. · Have a rare polyp syndrome that runs in families, such as familial adenomatous polyposis (FAP). · Have had radiation treatments to the belly or pelvis. When should you call for help? Watch closely for changes in your health, and be sure to contact your doctor if:    · You have any changes in your bowel habits.     · You have any problems. Where can you learn more? Go to http://nikos-patrizia.info/. Enter M541 in the search box to learn more about \"Colon Cancer Screening: Care Instructions. \"  Current as of: March 28, 2018  Content Version: 11.8  © 4992-9324 [employer]. Care instructions adapted under license by [employer] (which disclaims liability or warranty for this information).  If you have questions about a medical condition or this instruction, always ask your healthcare professional. Rolan Castanon disclaims any warranty or liability for your use of this information. Osteoporosis: Care Instructions  Your Care Instructions    Osteoporosis causes bones to become thin and weak. It is much more common in women than in men. Osteoporosis may be very advanced before you know you have it. Sometimes the first sign is a broken bone in the hip, spine, or wrist or sudden pain in your middle or lower back. Follow-up care is a key part of your treatment and safety. Be sure to make and go to all appointments, and call your doctor if you are having problems. It's also a good idea to know your test results and keep a list of the medicines you take. How can you care for yourself at home? · Your doctor may prescribe a bisphosphonate, such as risedronate (Actonel) or alendronate (Fosamax), for osteoporosis. If you are taking one of these medicines by mouth:  ? Take your medicine with a full glass of water when you first get up in the morning. ? Do not lie down, eat, drink a beverage, or take any other medicine for at least 30 minutes after taking the drug. This helps prevent stomach problems. ? Do not take your medicine late in the day if you forgot to take it in the morning. Skip it, and take the usual dose the next morning. ? If you have side effects, tell your doctor. He or she may prescribe another medicine. · Get enough calcium and vitamin D. The [place] [employer] recommends adults younger than age 46 need 1,000 mg of calcium and 600 IU of vitamin D each day. Women ages 46 to 79 need 1,200 mg of calcium and 600 IU of vitamin D each day. Men ages 46 to 79 need 1,000 mg of calcium and 600 IU of vitamin D each day. Adults 71 and older need 1,200 mg of calcium and 800 IU of vitamin D each day. ? Eat foods rich in calcium, like yogurt, cheese, milk, and dark green vegetables. This is a good way to get the calcium you need. You can get vitamin D from eggs, fatty fish, cereal, and milk.   ? Talk to your doctor about taking a calcium plus vitamin D supplement. Be careful, though. Adults ages 23 to 48 should not get more than 2,500 mg of calcium and 4,000 IU of vitamin D each day, whether it is from supplements and/or food. Adults ages 46 and older should not get more than 2,000 mg of calcium and 4,000 IU of vitamin D each day from supplements and/or food. · Limit alcohol to 2 drinks a day for men and 1 drink a day for women. Too much alcohol can cause health problems. · Do not smoke. Smoking puts you at a much higher risk for osteoporosis. If you need help quitting, talk to your doctor about stop-smoking programs and medicines. These can increase your chances of quitting for good. · Get regular bone-building exercise. Weight-bearing and resistance exercises keep bones healthy by working the muscles and bones against gravity. Start out at an exercise level that feels right for you. Add a little at a time until you can do the following:  ? Do 30 minutes of weight-bearing exercise on most days of the week. Walking, jogging, stair climbing, and dancing are good choices. ? Do resistance exercises with weights or elastic bands 2 to 3 days a week. · Reduce your risk of falls:  ? Wear supportive shoes with low heels and nonslip soles. ? Use a cane or walker, if you need it. Use shower chairs and bath benches. Put in handrails on stairways, around your shower or tub area, and near the toilet. ? Keep stairs, porches, and walkways well lit. Use night-lights. ? Remove throw rugs and other objects that are in the way. ? Avoid icy, wet, or slippery surfaces. ? Keep a cordless phone and a flashlight with new batteries by your bed. When should you call for help? Watch closely for changes in your health, and be sure to contact your doctor if you have any problems. Where can you learn more? Go to http://nikos-patrizia.info/.   Enter K100 in the search box to learn more about \"Osteoporosis: Care Instructions. \"  Current as of: November 7, 2018  Content Version: 12.1  © 3779-9744 [employer]. Care instructions adapted under license by [employer] (which disclaims liability or warranty for this information). If you have questions about a medical condition or this instruction, always ask your healthcare professional. [address] any warranty or liability for your use of this information. Learning About Living Perroy  What is a living will? A living will is a legal form you use to write down the kind of care you want at the end of your life. It is used by the health professionals who will treat you if you aren't able to decide for yourself. If you put your wishes in writing, your loved ones and others will know what kind of care you want. They won't need to guess. This can ease your mind and be helpful to others. A living will is not the same as an estate or property will. An estate will explains what you want to happen with your money and property after you die. Is a living will a legal document? A living will is a legal document. Each state has its own laws about living sanches. If you move to another state, make sure that your living will is legal in the state where you now live. Or you might use a universal form that has been approved by many states. This kind of form can sometimes be completed and stored online. Your electronic copy will then be available wherever you have a connection to the Internet. In most cases, doctors will respect your wishes even if you have a form from a different state. · You don't need an  to complete a living will. But legal advice can be helpful if your state's laws are unclear, your health history is complicated, or your family can't agree on what should be in your living will. · You can change your living will at any time.  Some people find that their wishes about end-of-life care change as their health changes. · In addition to making a living will, think about completing a medical power of  form. This form lets you name the person you want to make end-of-life treatment decisions for you (your \"health care agent\") if you're not able to. Many hospitals and nursing homes will give you the forms you need to complete a living will and a medical power of . · Your living will is used only if you can't make or communicate decisions for yourself anymore. If you become able to make decisions again, you can accept or refuse any treatment, no matter what you wrote in your living will. · Your state may offer an online registry. This is a place where you can store your living will online so the doctors and nurses who need to treat you can find it right away. What should you think about when creating a living will? Talk about your end-of-life wishes with your family members and your doctor. Let them know what you want. That way the people making decisions for you won't be surprised by your choices. Think about these questions as you make your living will:  · Do you know enough about life support methods that might be used? If not, talk to your doctor so you know what might be done if you can't breathe on your own, your heart stops, or you're unable to swallow. · What things would you still want to be able to do after you receive life-support methods? Would you want to be able to walk? To speak? To eat on your own? To live without the help of machines? · If you have a choice, where do you want to be cared for? In your home? At a hospital or nursing home? · Do you want certain Mandaen practices performed if you become very ill? · If you have a choice at the end of your life, where would you prefer to die? At home? In a hospital or nursing home? Somewhere else? · Would you prefer to be buried or cremated? · Do you want your organs to be donated after you die?   What should you do with your living will?  · Make sure that your family members and your health care agent have copies of your living will. · Give your doctor a copy of your living will to keep in your medical record. If you have more than one doctor, make sure that each one has a copy. · You may want to put a copy of your living will where it can be easily found. Where can you learn more? Go to http://nikos-patrizia.info/. Enter X278 in the search box to learn more about \"Learning About Living Perroserene. \"  Current as of: August 8, 2016  Content Version: 11.3  © 9944-1597 [employer]. Care instructions adapted under license by [employer] (which disclaims liability or warranty for this information). If you have questions about a medical condition or this instruction, always ask your healthcare professional. [address] any warranty or liability for your use of this information. Preventing Falls: Care Instructions  Your Care Instructions    Getting around your home safely can be a challenge if you have injuries or health problems that make it easy for you to fall. Loose rugs and furniture in walkways are among the dangers for many older people who have problems walking or who have poor eyesight. People who have conditions such as arthritis, osteoporosis, or dementia also have to be careful not to fall. You can make your home safer with a few simple measures. Follow-up care is a key part of your treatment and safety. Be sure to make and go to all appointments, and call your doctor if you are having problems. It's also a good idea to know your test results and keep a list of the medicines you take. How can you care for yourself at home? Taking care of yourself  · You may get dizzy if you do not drink enough water. To prevent dehydration, drink plenty of fluids, enough so that your urine is light yellow or clear like water. Choose water and other caffeine-free clear liquids.  If you have kidney, heart, or liver disease and have to limit fluids, talk with your doctor before you increase the amount of fluids you drink. · Exercise regularly to improve your strength, muscle tone, and balance. Walk if you can. Swimming may be a good choice if you cannot walk easily. · Have your vision and hearing checked each year or any time you notice a change. If you have trouble seeing and hearing, you might not be able to avoid objects and could lose your balance. · Know the side effects of the medicines you take. Ask your doctor or pharmacist whether the medicines you take can affect your balance. Sleeping pills or sedatives can affect your balance. · Limit the amount of alcohol you drink. Alcohol can impair your balance and other senses. · Ask your doctor whether calluses or corns on your feet need to be removed. If you wear loose-fitting shoes because of calluses or corns, you can lose your balance and fall. · Talk to your doctor if you have numbness in your feet. Preventing falls at home  · Remove raised doorway thresholds, throw rugs, and clutter. Repair loose carpet or raised areas in the floor. · Move furniture and electrical cords to keep them out of walking paths. · Use nonskid floor wax, and wipe up spills right away, especially on ceramic tile floors. · If you use a walker or cane, put rubber tips on it. If you use crutches, clean the bottoms of them regularly with an abrasive pad, such as steel wool. · Keep your house well lit, especially [facility], and outside walkways. Use night-lights in areas such as hallways and bathrooms. Add extra light switches or use remote switches (such as switches that go on or off when you clap your hands) to make it easier to turn lights on if you have to get up during the night. · Install sturdy handrails on stairways. · Move items in your cabinets so that the things you use a lot are on the lower shelves (about waist level).   · Keep a cordless phone and a flashlight with new batteries by your bed. If possible, put a phone in each of the main rooms of your house, or carry a cell phone in case you fall and cannot reach a phone. Or, you can wear a device around your neck or wrist. You push a button that sends a signal for help. · Wear low-heeled shoes that fit well and give your feet good support. Use footwear with nonskid soles. Check the heels and soles of your shoes for wear. Repair or replace worn heels or soles. · Do not wear socks without shoes on wood floors. · Walk on the grass when the sidewalks are slippery. If you live in an area that gets snow and ice in the winter, sprinkle salt on slippery steps and sidewalks. Preventing falls in the bath  · Install grab bars and nonskid mats inside and outside your shower or tub and near the toilet and sinks. · Use shower chairs and bath benches. · Use a hand-held shower head that will allow you to sit while showering. · Get into a tub or shower by putting the weaker leg in first. Get out of a tub or shower with your strong side first.  · Repair loose toilet seats and consider installing a raised toilet seat to make getting on and off the toilet easier. · Keep your bathroom door unlocked while you are in the shower. Where can you learn more? Go to http://nikos-patrizia.info/. Enter [phone] in the search box to learn more about \"Preventing Falls: Care Instructions. \"  Current as of: March 16, 2018  Content Version: 11.8  © 5540-1428 [employer]. Care instructions adapted under license by [employer] (which disclaims liability or warranty for this information). If you have questions about a medical condition or this instruction, always ask your healthcare professional. [address] any warranty or liability for your use of this information.

## 2019-08-13 DIAGNOSIS — I10 HTN, GOAL BELOW 130/80: ICD-10-CM

## 2019-08-13 RX ORDER — METOPROLOL TARTRATE 25 MG/1
TABLET, FILM COATED ORAL
Qty: 60 TAB | Refills: 0 | Status: SHIPPED | OUTPATIENT
Start: 2019-08-13 | End: 2019-08-14 | Stop reason: SDUPTHER

## 2019-08-14 ENCOUNTER — OFFICE VISIT (OUTPATIENT)
Dept: FAMILY MEDICINE CLINIC | Age: 64
End: 2019-08-14

## 2019-08-14 VITALS
HEIGHT: 60 IN | BODY MASS INDEX: 22.1 KG/M2 | SYSTOLIC BLOOD PRESSURE: 120 MMHG | TEMPERATURE: 96.7 F | WEIGHT: 112.6 LBS | RESPIRATION RATE: 18 BRPM | DIASTOLIC BLOOD PRESSURE: 84 MMHG | HEART RATE: 71 BPM | OXYGEN SATURATION: 100 %

## 2019-08-14 DIAGNOSIS — E11.21 TYPE II DIABETES MELLITUS WITH NEPHROPATHY (HCC): ICD-10-CM

## 2019-08-14 DIAGNOSIS — N89.8 VAGINAL DISCHARGE: ICD-10-CM

## 2019-08-14 DIAGNOSIS — I10 HTN, GOAL BELOW 130/80: ICD-10-CM

## 2019-08-14 DIAGNOSIS — N76.0 BACTERIAL VAGINOSIS: ICD-10-CM

## 2019-08-14 DIAGNOSIS — Z23 ENCOUNTER FOR IMMUNIZATION: ICD-10-CM

## 2019-08-14 DIAGNOSIS — B96.89 BACTERIAL VAGINOSIS: ICD-10-CM

## 2019-08-14 DIAGNOSIS — Z01.419 PAP TEST, AS PART OF ROUTINE GYNECOLOGICAL EXAMINATION: ICD-10-CM

## 2019-08-14 RX ORDER — METOPROLOL TARTRATE 25 MG/1
TABLET, FILM COATED ORAL
Qty: 60 TAB | Refills: 6 | Status: SHIPPED | OUTPATIENT
Start: 2019-08-14 | End: 2019-11-07 | Stop reason: SDUPTHER

## 2019-08-14 NOTE — PROGRESS NOTES
HISTORY OF PRESENT ILLNESS  Kelle Cordova is a 60 y.o. female. HPI     Vaginal Discharge   The history is provided by the patient. This is a new problem. The current episode started more than 1 week ago. The problem occurs daily. The problem has not changed since onset. The discharge occurs spontaneously. The discharge was milky Like  Associated symptoms include dyspareunia, frequency, genital burning, genital itching and perineal odor. Pertinent negatives include no fever, no abdominal swelling, no abdominal pain, no constipation, no diarrhea, no nausea, no vomiting, no dysuria and no genital lesions. She has tried NSAIDs for the symptoms. The treatment provided no relief. Her past medical history does not include irregular periods or STD. LMP 12yrs ago,   HTN  Today pt present for Bp check and++= Compliancy w/ the bp meds, having had the low salt diet ,  has been active, patien does not obtain the bp at home ,, today the pt denies Chest Pain, has no legs swelling no lightheadedness,  DMtype II  Patient also present for diabetic check,  the patient has been compliancy w/ meds, patient also states that the pt is trying to have a diabetic diet, and eat less of carbohydrates, since last visit patient has been more active with daily walking,  This time patient denies  tingling sensation, has no polyurea and polydipsia, last a1c was at target of 6.3%           Current Outpatient Medications   Medication Sig Dispense Refill    metoprolol tartrate (LOPRESSOR) 25 mg tablet TAKE 1 TABLET BY MOUTH TWICE A DAY 60 Tab 0    lisinopril-hydroCHLOROthiazide (PRINZIDE, ZESTORETIC) 20-25 mg per tablet Take 1 Tab by mouth daily. Patient requires an office visit for additional refills. 90 Tab 1    metFORMIN (GLUCOPHAGE) 500 mg tablet Take 0.5 Tabs by mouth two (2) times daily (with meals). 90 Tab 3    simvastatin (ZOCOR) 10 mg tablet TAKE 1 TABLET BY MOUTH EVERY NIGHT PATIENT REQUIRES AN OFFICE VISIT FOR ADDITIONAL REFILLS. 90 Tab 1    cloNIDine HCl (CATAPRES) 0.2 mg tablet TAKE 1 TABLET BY MOUTH EVERY NIGHT 90 Tab 1    gabapentin (NEURONTIN) 100 mg capsule Take 1 Cap by mouth three (3) times daily. Indications: NEUROPATHIC PAIN 90 Cap 02    aspirin 81 mg chewable tablet Take 1 Tab by mouth daily.  30 Tab 11     Allergies   Allergen Reactions    Codeine Nausea and Vomiting     Past Medical History:   Diagnosis Date    Arthritis     [facility]2015    Dyspepsia and other specified disorders of function of stomach     Foot pain, bilateral 6/15/2015    Hemiparesis affecting left side as late effect of cerebrovascular accident ([facility] 4/18/2014    Hypercholesteremia 4/18/2014    Hypertension     Knee pain, bilateral 6/15/2015    Lipoma of back 5/14/2015    Nausea & vomiting     Prediabetes 5/14/2015    S/P stroke due to cerebrovascular disease 4/18/2014    Screening for breast cancer 9/15/2015    Stroke ([facility]     Stroke ([facility] 7/16/2019    Type II diabetes mellitus with nephropathy ([facility] 7/23/2019     Past Surgical History:   Procedure Laterality Date    ABDOMEN SURGERY PROC UNLISTED  2004    hernia repair    HX TUBAL LIGATION  1984     Family History   Problem Relation Age of Onset    Hypertension Mother     Diabetes Mother     Hypertension Father     Hypertension Brother     Stroke Brother      Social History     Tobacco Use    Smoking status: Former Smoker     Packs/day: 0.25     Years: 35.00     Pack years: 8.75     Last attempt to quit: 5/28/2009     Years since quitting: 10.2    Smokeless tobacco: Never Used   Substance Use Topics    Alcohol use: No     Comment: ETOH abuse in past; quit 2012      Lab Results   Component Value Date/Time    WBC 7.6 07/16/2019 03:42 PM    HGB 11.6 07/16/2019 03:42 PM    HCT 36.2 07/16/2019 03:42 PM    PLATELET 788 20/97/2900 03:42 PM    MCV 85 07/16/2019 03:42 PM     Lab Results   Component Value Date/Time    Hemoglobin A1c 6.6 (H) 09/15/2015 10:51 AM Hemoglobin A1c 7.0 (H) 05/14/2015 03:44 PM    Hemoglobin A1c 6.3 (H) 05/22/2014 09:01 AM    Glucose 99 07/16/2019 03:42 PM    Glucose (POC) 112 (H) 06/04/2015 08:08 AM    Microalb/Creat ratio (ug/mg creat.) 96.3 (H) 07/16/2019 03:41 PM    LDL, calculated 117 (H) 07/16/2019 03:42 PM    Creatinine 0.57 07/16/2019 03:42 PM      Lab Results   Component Value Date/Time    Cholesterol, total 195 07/16/2019 03:42 PM    HDL Cholesterol 61 07/16/2019 03:42 PM    LDL, calculated 117 (H) 07/16/2019 03:42 PM    Triglyceride 84 07/16/2019 03:42 PM     Lab Results   Component Value Date/Time    GFR est non- 07/16/2019 03:42 PM    GFR est  07/16/2019 03:42 PM    Creatinine 0.57 07/16/2019 03:42 PM    BUN 15 07/16/2019 03:42 PM    Sodium 141 07/16/2019 03:42 PM    Potassium 4.1 07/16/2019 03:42 PM    Chloride 103 07/16/2019 03:42 PM    CO2 22 07/16/2019 03:42 PM        Review of Systems   Constitutional: Negative for chills and fever. HENT: Negative for nosebleeds. Eyes: Negative for pain. Respiratory: Negative for cough and wheezing. Cardiovascular: Negative for chest pain and leg swelling. Gastrointestinal: Negative for constipation, diarrhea and nausea. Genitourinary: Negative for frequency. Musculoskeletal: Negative for joint pain and myalgias. Skin: Negative for rash. Neurological: Negative for loss of consciousness. Endo/Heme/Allergies: Does not bruise/bleed easily. Psychiatric/Behavioral: Negative for depression. The patient is not nervous/anxious and does not have insomnia. All other systems reviewed and are negative. Physical Exam   Constitutional: She is oriented to person, place, and time. She appears well-developed and well-nourished. HENT:   Head: Normocephalic and atraumatic. Eyes: Conjunctivae and EOM are normal.   Neck: Normal range of motion. Neck supple. Cardiovascular: Normal rate, regular rhythm and normal heart sounds. No murmur heard.   Pulmonary/Chest: Effort normal and breath sounds normal.   Abdominal: Soft. Bowel sounds are normal. She exhibits no distension. Musculoskeletal: Normal range of motion. She exhibits no edema. Lymphadenopathy:     She has no cervical adenopathy. Neurological: She is alert and oriented to person, place, and time. Skin: No erythema. Psychiatric: Her behavior is normal.   Nursing note and vitals reviewed. ASSESSMENT and PLAN  Diagnoses and all orders for this visit:    1. Type II diabetes mellitus with complication, uncontrolled (HCC)  -     REFERRAL TO OPTOMETRY  -     metoprolol tartrate (LOPRESSOR) 25 mg tablet; TAKE 1 TABLET BY MOUTH TWICE A DAY    2. Encounter for immunization  -     varicella-zoster recombinant, PF, (SHINGRIX) 50 mcg/0.5 mL susr injection; 0.5 mL by IntraMUSCular route once for 1 dose. 3. Type II diabetes mellitus with nephropathy (HCC)  -     REFERRAL TO OPTOMETRY    4. HTN, goal below 130/80  -     metoprolol tartrate (LOPRESSOR) 25 mg tablet; TAKE 1 TABLET BY MOUTH TWICE A DAY    5. Pap test, as part of routine gynecological examination  -     PAP LB, HPV-H+LR([id number])  -     CT HANDLG&/OR CONVEY OF SPEC FOR TR OFFICE TO LAB    6. Vaginal discharge  -     NUSWAB VAGINITIS PLUS  -     PAP LB, HPV-H+LR([id number])  -     CT HANDLG&/OR CONVEY OF SPEC FOR TR OFFICE TO LAB  -     metroNIDAZOLE (FLAGYL) 500 mg tablet; Take 1 Tab by mouth two (2) times daily (after meals) for 7 days. 7. Bacterial vaginosis  -     metroNIDAZOLE (FLAGYL) 500 mg tablet; Take 1 Tab by mouth two (2) times daily (after meals) for 7 days.

## 2019-08-14 NOTE — PROGRESS NOTES
Name and  verified      Chief Complaint   Patient presents with   [facility] reviewed-discussed with patient. 1. Have you been to the ER, urgent care clinic since your last visit? Hospitalized since your last visit? no    2. Have you seen or consulted any other health care providers outside of the [address] [place] [address] since your last visit? Include any pap smears or colon screening.   no

## 2019-08-16 LAB
A VAGINAE DNA VAG QL NAA+PROBE: ABNORMAL SCORE
BVAB2 DNA VAG QL NAA+PROBE: ABNORMAL SCORE
C ALBICANS DNA VAG QL NAA+PROBE: NEGATIVE
C GLABRATA DNA VAG QL NAA+PROBE: NEGATIVE
C TRACH RRNA SPEC QL NAA+PROBE: NEGATIVE
MEGA1 DNA VAG QL NAA+PROBE: ABNORMAL SCORE
N GONORRHOEA RRNA SPEC QL NAA+PROBE: NEGATIVE
T VAGINALIS RRNA SPEC QL NAA+PROBE: NEGATIVE

## 2019-08-17 LAB
CYTOLOGIST CVX/VAG CYTO: NORMAL
CYTOLOGY CVX/VAG DOC CYTO: NORMAL
DX ICD CODE: NORMAL
HPV I/H RISK 1 DNA CVX QL PROBE+SIG AMP: NEGATIVE
HPV LOW RISK DNA CVX QL PROBE+SIG AMP: NEGATIVE
Lab: NORMAL
OTHER STN SPEC: NORMAL
STAT OF ADQ CVX/VAG CYTO-IMP: NORMAL

## 2019-08-19 ENCOUNTER — HOSPITAL ENCOUNTER (OUTPATIENT)
Dept: MAMMOGRAPHY | Age: 64
Discharge: HOME | End: 2019-08-19
Attending: FAMILY MEDICINE
Payer: MEDICAID

## 2019-08-19 DIAGNOSIS — Z12.31 ENCOUNTER FOR SCREENING MAMMOGRAM FOR MALIGNANT NEOPLASM OF BREAST: ICD-10-CM

## 2019-08-19 DIAGNOSIS — M94.9 DISORDER OF BONE AND CARTILAGE: ICD-10-CM

## 2019-08-19 DIAGNOSIS — M89.9 DISORDER OF BONE AND CARTILAGE: ICD-10-CM

## 2019-08-19 PROCEDURE — 77080 DXA BONE DENSITY AXIAL: CPT

## 2019-08-19 PROCEDURE — 77067 SCR MAMMO BI INCL CAD: CPT

## 2019-08-30 RX ORDER — METRONIDAZOLE 500 MG/1
500 TABLET ORAL
Qty: 14 TAB | Refills: 0 | Status: SHIPPED | OUTPATIENT
Start: 2019-08-30 | End: 2019-09-06

## 2019-10-01 DIAGNOSIS — Z86.73 S/P STROKE DUE TO CEREBROVASCULAR DISEASE: ICD-10-CM

## 2019-10-01 DIAGNOSIS — I10 HTN, GOAL BELOW 130/80: ICD-10-CM

## 2019-10-01 DIAGNOSIS — E78.00 HYPERCHOLESTEREMIA: ICD-10-CM

## 2019-10-01 DIAGNOSIS — I69.354 HEMIPARESIS AFFECTING LEFT SIDE AS LATE EFFECT OF CEREBROVASCULAR ACCIDENT (HCC): ICD-10-CM

## 2019-10-02 RX ORDER — CLONIDINE HYDROCHLORIDE 0.2 MG/1
TABLET ORAL
Qty: 30 TAB | Refills: 1 | Status: SHIPPED | OUTPATIENT
Start: 2019-10-02 | End: 2019-11-07 | Stop reason: ALTCHOICE

## 2019-10-17 ENCOUNTER — HOSPITAL ENCOUNTER (OUTPATIENT)
Age: 64
Setting detail: OUTPATIENT SURGERY
Discharge: HOME | End: 2019-10-17
Attending: INTERNAL MEDICINE | Admitting: INTERNAL MEDICINE
Payer: MEDICAID

## 2019-10-17 ENCOUNTER — ANESTHESIA (OUTPATIENT)
Dept: ENDOSCOPY | Age: 64
End: 2019-10-17
Payer: MEDICAID

## 2019-10-17 ENCOUNTER — ANESTHESIA EVENT (OUTPATIENT)
Dept: ENDOSCOPY | Age: 64
End: 2019-10-17
Payer: MEDICAID

## 2019-10-17 VITALS
SYSTOLIC BLOOD PRESSURE: 130 MMHG | WEIGHT: 112 LBS | TEMPERATURE: 98.2 F | HEIGHT: 60 IN | BODY MASS INDEX: 21.99 KG/M2 | DIASTOLIC BLOOD PRESSURE: 85 MMHG | HEART RATE: 96 BPM | OXYGEN SATURATION: 98 % | RESPIRATION RATE: 14 BRPM

## 2019-10-17 PROCEDURE — 76060000032 HC ANESTHESIA 0.5 TO 1 HR: Performed by: INTERNAL MEDICINE

## 2019-10-17 PROCEDURE — 76040000007: Performed by: INTERNAL MEDICINE

## 2019-10-17 PROCEDURE — 77030009426 HC FCPS BIOP ENDOSC BSC -B: Performed by: INTERNAL MEDICINE

## 2019-10-17 PROCEDURE — 74011250636 HC RX REV CODE- 250/636: Performed by: NURSE ANESTHETIST, CERTIFIED REGISTERED

## 2019-10-17 PROCEDURE — 74011000250 HC RX REV CODE- 250: Performed by: NURSE ANESTHETIST, CERTIFIED REGISTERED

## 2019-10-17 PROCEDURE — 88305 TISSUE EXAM BY PATHOLOGIST: CPT

## 2019-10-17 RX ORDER — SODIUM CHLORIDE 0.9 % (FLUSH) 0.9 %
5-40 SYRINGE (ML) INJECTION EVERY 8 HOURS
Status: DISCONTINUED | OUTPATIENT
Start: 2019-10-17 | End: 2019-10-17 | Stop reason: HOSPADM

## 2019-10-17 RX ORDER — PHENYLEPHRINE HCL IN 0.9% NACL 0.4MG/10ML
SYRINGE (ML) INTRAVENOUS AS NEEDED
Status: DISCONTINUED | OUTPATIENT
Start: 2019-10-17 | End: 2019-10-17 | Stop reason: HOSPADM

## 2019-10-17 RX ORDER — FENTANYL CITRATE 50 UG/ML
25-200 INJECTION, SOLUTION INTRAMUSCULAR; INTRAVENOUS
Status: DISCONTINUED | OUTPATIENT
Start: 2019-10-17 | End: 2019-10-17 | Stop reason: HOSPADM

## 2019-10-17 RX ORDER — LIDOCAINE HYDROCHLORIDE 20 MG/ML
INJECTION, SOLUTION EPIDURAL; INFILTRATION; INTRACAUDAL; PERINEURAL AS NEEDED
Status: DISCONTINUED | OUTPATIENT
Start: 2019-10-17 | End: 2019-10-17 | Stop reason: HOSPADM

## 2019-10-17 RX ORDER — MIDAZOLAM HYDROCHLORIDE 1 MG/ML
.25-5 INJECTION, SOLUTION INTRAMUSCULAR; INTRAVENOUS
Status: DISCONTINUED | OUTPATIENT
Start: 2019-10-17 | End: 2019-10-17 | Stop reason: HOSPADM

## 2019-10-17 RX ORDER — FLUMAZENIL 0.1 MG/ML
0.2 INJECTION INTRAVENOUS
Status: DISCONTINUED | OUTPATIENT
Start: 2019-10-17 | End: 2019-10-17 | Stop reason: HOSPADM

## 2019-10-17 RX ORDER — SODIUM CHLORIDE 9 MG/ML
INJECTION, SOLUTION INTRAVENOUS
Status: DISCONTINUED | OUTPATIENT
Start: 2019-10-17 | End: 2019-10-17 | Stop reason: HOSPADM

## 2019-10-17 RX ORDER — NALOXONE HYDROCHLORIDE 0.4 MG/ML
0.4 INJECTION, SOLUTION INTRAMUSCULAR; INTRAVENOUS; SUBCUTANEOUS
Status: DISCONTINUED | OUTPATIENT
Start: 2019-10-17 | End: 2019-10-17 | Stop reason: HOSPADM

## 2019-10-17 RX ORDER — SODIUM CHLORIDE 0.9 % (FLUSH) 0.9 %
5-40 SYRINGE (ML) INJECTION AS NEEDED
Status: DISCONTINUED | OUTPATIENT
Start: 2019-10-17 | End: 2019-10-17 | Stop reason: HOSPADM

## 2019-10-17 RX ORDER — EPINEPHRINE 0.1 MG/ML
1 INJECTION INTRACARDIAC; INTRAVENOUS
Status: DISCONTINUED | OUTPATIENT
Start: 2019-10-17 | End: 2019-10-17 | Stop reason: HOSPADM

## 2019-10-17 RX ORDER — ATROPINE SULFATE 0.1 MG/ML
0.5 INJECTION INTRAVENOUS
Status: DISCONTINUED | OUTPATIENT
Start: 2019-10-17 | End: 2019-10-17 | Stop reason: HOSPADM

## 2019-10-17 RX ORDER — DEXTROMETHORPHAN/PSEUDOEPHED 2.5-7.5/.8
1.2 DROPS ORAL
Status: DISCONTINUED | OUTPATIENT
Start: 2019-10-17 | End: 2019-10-17 | Stop reason: HOSPADM

## 2019-10-17 RX ORDER — SODIUM CHLORIDE 9 MG/ML
50 INJECTION, SOLUTION INTRAVENOUS CONTINUOUS
Status: DISCONTINUED | OUTPATIENT
Start: 2019-10-17 | End: 2019-10-17 | Stop reason: HOSPADM

## 2019-10-17 RX ORDER — PROPOFOL 10 MG/ML
INJECTION, EMULSION INTRAVENOUS AS NEEDED
Status: DISCONTINUED | OUTPATIENT
Start: 2019-10-17 | End: 2019-10-17 | Stop reason: HOSPADM

## 2019-10-17 RX ADMIN — PROPOFOL 25 MG: 10 INJECTION, EMULSION INTRAVENOUS at 14:19

## 2019-10-17 RX ADMIN — PROPOFOL 25 MG: 10 INJECTION, EMULSION INTRAVENOUS at 14:18

## 2019-10-17 RX ADMIN — PROPOFOL 50 MG: 10 INJECTION, EMULSION INTRAVENOUS at 14:13

## 2019-10-17 RX ADMIN — PROPOFOL 50 MG: 10 INJECTION, EMULSION INTRAVENOUS at 14:16

## 2019-10-17 RX ADMIN — PHENYLEPHRINE HYDROCHLORIDE 80 MCG: 10 INJECTION INTRAVENOUS at 14:25

## 2019-10-17 RX ADMIN — LIDOCAINE HYDROCHLORIDE 40 MG: 20 INJECTION, SOLUTION EPIDURAL; INFILTRATION; INTRACAUDAL; PERINEURAL at 14:11

## 2019-10-17 RX ADMIN — PROPOFOL 50 MG: 10 INJECTION, EMULSION INTRAVENOUS at 14:12

## 2019-10-17 RX ADMIN — PROPOFOL 50 MG: 10 INJECTION, EMULSION INTRAVENOUS at 14:14

## 2019-10-17 RX ADMIN — SODIUM CHLORIDE: 900 INJECTION, SOLUTION INTRAVENOUS at 13:54

## 2019-10-17 NOTE — ANESTHESIA PREPROCEDURE EVALUATION
Relevant Problems   No relevant active problems       Anesthetic History   No history of anesthetic complications            Review of Systems / Medical History  Patient summary reviewed, nursing notes reviewed and pertinent labs reviewed    Pulmonary  Within defined limits                 Neuro/Psych   Within defined limits    CVA  TIA     Cardiovascular  Within defined limits  Hypertension              Exercise tolerance: <4 METS     GI/Hepatic/Renal  Within defined limits              Endo/Other  Within defined limits  Diabetes    Arthritis     Other Findings              Physical Exam    Airway  Mallampati: II  TM Distance: > 6 cm  Neck ROM: normal range of motion   Mouth opening: Normal     Cardiovascular  Regular rate and rhythm,  S1 and S2 normal,  no murmur, click, rub, or gallop             Dental  No notable dental hx       Pulmonary  Breath sounds clear to auscultation               Abdominal  GI exam deferred       Other Findings            Anesthetic Plan    ASA: 3  Anesthesia type: MAC    Monitoring Plan: BIS      Induction: Intravenous  Anesthetic plan and risks discussed with: Patient

## 2019-10-17 NOTE — DISCHARGE INSTRUCTIONS
[facility]    COLON DISCHARGE INSTRUCTIONS    Akosua Almodovar  [id number]  1955    Discomfort:  Redness at IV site- apply warm compress to area; if redness or soreness persist- contact your physician  There may be a slight amount of blood passed from the rectum  Gaseous discomfort- walking, belching will help relieve any discomfort  You may not operate a vehicle for 12 hours  You may not engage in an occupation involving machinery or appliances for rest of today  You may not drink alcoholic beverages for at least 12 hours  Avoid making any critical decisions for at least 24 hour  DIET:  You may resume your regular diet - however -  remember your colon is empty and a heavy meal will produce gas. Avoid these foods:  vegetables, fried / greasy foods, carbonated drinks     ACTIVITY:  You may  resume your normal daily activities it is recommended that you spend the remainder of the day resting -  avoid any strenuous activity. CALL M.D. ANY SIGN OF:   Increasing pain, nausea, vomiting  Abdominal distension (swelling)  New increased bleeding (oral or rectal)  Fever (chills)  Pain in chest area  Bloody discharge from nose or mouth  Shortness of breath      Follow-up Instructions:   Call Dr. Usama Ye for any questions or problems at [phone]          ENDOSCOPY FINDINGS:   Your colonoscopy showed one small polyp removed, rest of exam was normal.  Telephone # [phone]      Signed By: Usama Ye MD     10/17/2019  2:35 PM       DISCHARGE SUMMARY from Nurse    The following personal items collected during your admission are returned to you:   Dental Appliance: Dental Appliances: None  Vision: Visual Aid: None  Hearing Aid:    Jewelry:    Clothing:    Other Valuables:    Valuables sent to safe:    Patient Education        Colon Polyps: Care Instructions  Your Care Instructions    Colon polyps are growths in the colon or the rectum. The cause of most colon polyps is not known, and most people who get them do not have any problems. But a certain kind can turn into cancer. For this reason, regular testing for colon polyps is important for people as they get older. It is also important for anyone who has an increased risk for colon cancer. Polyps are usually found through routine colon cancer screening tests. Although most colon polyps are not cancerous, they are usually removed and then tested for cancer. Screening for colon cancer saves lives because the cancer can usually be cured if it is caught early. If you have a polyp that is the type that can turn into cancer, you may need more tests to examine your entire colon. The doctor will remove any other polyps that he or she finds, and you will be tested more often. Follow-up care is a key part of your treatment and safety. Be sure to make and go to all appointments, and call your doctor if you are having problems. It's also a good idea to know your test results and keep a list of the medicines you take. How can you care for yourself at home? Regular exams to look for colon polyps are the best way to prevent polyps from turning into colon cancer. These can include stool tests, sigmoidoscopy, colonoscopy, and CT colonography. Talk with your doctor about a testing schedule that is right for you. To prevent polyps  There is no home treatment that can prevent colon polyps. But these steps may help lower your risk for cancer. · Stay active. Being active can help you get to and stay at a healthy weight. Try to exercise on most days of the week. Walking is a good choice. · Eat well. Choose a variety of vegetables, fruits, legumes (such as peas and beans), fish, poultry, and whole grains. · Do not smoke. If you need help quitting, talk to your doctor about stop-smoking programs and medicines. These can increase your chances of quitting for good. · If you drink alcohol, limit how much you drink. Limit alcohol to 2 drinks a day for men and 1 drink a day for women. When should you call for help? Call your doctor now or seek immediate medical care if:    · You have severe belly pain.     · Your stools are maroon or very bloody.    Watch closely for changes in your health, and be sure to contact your doctor if:    · You have a fever.     · You have nausea or vomiting.     · You have a change in bowel habits (new constipation or diarrhea).     · Your symptoms get worse or are not improving as expected. Where can you learn more? Go to http://nikos-patrizia.info/. Enter [phone] [id number] in the search box to learn more about \"Colon Polyps: Care Instructions. \"  Current as of: December 19, 2018  Content Version: 12.2  © 9088-3345 [employer]. Care instructions adapted under license by [employer] (which disclaims liability or warranty for this information). If you have questions about a medical condition or this instruction, always ask your healthcare professional. [address] any warranty or liability for your use of this information.

## 2019-10-17 NOTE — H&P
[address] [place] [address], [address] [place] [address]      History and Physical       NAME:  Hermelinda Hicks   :   1955   MRN:   [id number]             History of Present Illness:  Patient is a 60 y.o. who is seen for screening colonoscopy. PMH:  Past Medical History:   Diagnosis Date    Arthritis     hands/all over    Diabetes mellitus ([facility] 6/15/2015    Dyspepsia and other specified disorders of function of stomach     Foot pain, bilateral 6/15/2015    Hemiparesis affecting left side as late effect of cerebrovascular accident ([address] 2014    Hypercholesteremia 2014    Hypertension     Knee pain, bilateral 6/15/2015    Lipoma of back 2015    Nausea & vomiting     Prediabetes 2015    S/P stroke due to cerebrovascular disease 2014    Screening for breast cancer 9/15/2015    Type II diabetes mellitus with nephropathy ([facility] 2019       PSH:  Past Surgical History:   Procedure Laterality Date    ABDOMEN SURGERY PROC UNLISTED      hernia repair    HX OTHER SURGICAL      lipoma removed from back    HX TUBAL LIGATION         Allergies: Allergies   Allergen Reactions    Codeine Nausea and Vomiting       Home Medications:  Prior to Admission Medications   Prescriptions Last Dose Informant Patient Reported? Taking?   aspirin 81 mg chewable tablet 10/16/2019 at Unknown time  No Yes   Sig: Take 1 Tab by mouth daily. cloNIDine HCl (CATAPRES) 0.2 mg tablet 10/17/2019 at Unknown time  No Yes   Sig: TAKE 1 TABLET BY MOUTH EVERY NIGHT   cloNIDine HCl (CATAPRES) 0.2 mg tablet 10/17/2019 at Unknown time  No Yes   Sig: TAKE 1 TABLET BY MOUTH EVERY DAY AT NIGHT   lisinopril-hydroCHLOROthiazide (PRINZIDE, ZESTORETIC) 20-25 mg per tablet 10/15/2019  No No   Sig: Take 1 Tab by mouth daily. Patient requires an office visit for additional refills.    metFORMIN (GLUCOPHAGE) 500 mg tablet 10/15/2019  No No   Sig: Take 0.5 Tabs by mouth two (2) times daily (with meals). metoprolol tartrate (LOPRESSOR) 25 mg tablet 10/17/2019 at Unknown time  No Yes   Sig: TAKE 1 TABLET BY MOUTH TWICE A DAY   simvastatin (ZOCOR) 10 mg tablet 10/16/2019 at Unknown time  No Yes   Sig: TAKE 1 TABLET BY MOUTH EVERY NIGHT PATIENT REQUIRES AN OFFICE VISIT FOR ADDITIONAL REFILLS.       Facility-Administered Medications: None       Hospital Medications:  Current Facility-Administered Medications   Medication Dose Route Frequency    0.9% sodium chloride infusion  50 mL/hr IntraVENous CONTINUOUS    sodium chloride (NS) flush 5-40 mL  5-40 mL IntraVENous Q8H    sodium chloride (NS) flush 5-40 mL  5-40 mL IntraVENous PRN    midazolam (VERSED) injection 0.25-5 mg  0.25-5 mg IntraVENous Multiple    fentaNYL citrate (PF) injection  mcg   mcg IntraVENous Multiple    naloxone (NARCAN) injection 0.4 mg  0.4 mg IntraVENous Multiple    flumazenil (ROMAZICON) 0.1 mg/mL injection 0.2 mg  0.2 mg IntraVENous Multiple    simethicone (MYLICON) 47UB/9.5PX oral drops 80 mg  1.2 mL Oral Multiple    atropine injection 0.5 mg  0.5 mg IntraVENous ONCE PRN    EPINEPHrine (ADRENALIN) 0.1 mg/mL syringe 1 mg  1 mg Endoscopically ONCE PRN     Facility-Administered Medications Ordered in Other Encounters   Medication Dose Route Frequency    0.9% sodium chloride infusion   IntraVENous CONTINUOUS       Social History:  Social History     Tobacco Use    Smoking status: Former Smoker     Packs/day: 0.25     Years: 35.00     Pack years: 8.75     Last attempt to quit: 5/28/2009     Years since quitting: 10.3    Smokeless tobacco: Never Used   Substance Use Topics    Alcohol use: No     Comment: ETOH abuse in past; quit 2012       Family History:  Family History   Problem Relation Age of Onset    Hypertension Mother     Diabetes Mother    Nic Owens Arthritis-rheumatoid Father     Hypertension Brother     Stroke Brother              Review of Systems:      Constitutional: negative fever, negative chills, negative weight loss  Eyes:   negative visual changes  ENT:   negative sore throat, tongue or lip swelling  Respiratory:  negative cough, negative dyspnea  Cards:  negative for chest pain, palpitations, lower extremity edema  GI:   See HPI  :  negative for frequency, dysuria  Integument:  negative for rash and pruritus  Heme:  negative for easy bruising and gum/nose bleeding  Musculoskel: negative for myalgias,  back pain and muscle weakness  Neuro: negative for headaches, dizziness, vertigo  Psych:  negative for feelings of anxiety, depression       Objective:     Patient Vitals for the past 8 hrs:   BP Temp Pulse Resp SpO2 Height Weight   10/17/19 1358 (!) 128/97 98.8 °F (37.1 °C) 98 18 99 % 5' (1.524 m) 50.8 kg (112 lb)     No intake/output data recorded. No intake/output data recorded. EXAM:     NEURO-a&o   HEENT-wnl   LUNGS-clear    COR-regular rate and rhythym     ABD-soft , no tenderness, no rebound, good bs     EXT-no edema     Data Review     No results for input(s): WBC, HGB, HCT, PLT, HGBEXT, HCTEXT, PLTEXT in the last 72 hours. No results for input(s): NA, K, CL, CO2, BUN, CREA, GLU, PHOS, CA in the last 72 hours. No results for input(s): SGOT, GPT, AP, TBIL, TP, ALB, GLOB, GGT, AML, LPSE in the last 72 hours. No lab exists for component: AMYP, HLPSE  No results for input(s): INR, PTP, APTT, INREXT in the last 72 hours.        Assessment:   · Screening colonoscopy      Patient Active Problem List   Diagnosis Code    HTN, goal below 130/80 I10    Hemiparesis affecting left side as late effect of cerebrovascular accident ([facility] I69.354    Hypercholesteremia E78.00    S/P stroke due to cerebrovascular disease Z86.73    Prediabetes R73.03    Lipoma of back D17.1    Non compliance w medication regimen Z91.14    Diabetes mellitus type 2, controlled ([facility] E11.9    Knee pain, bilateral M25.561, M25.562    Neuropathic pain of both feet G57.93    Type 2 diabetes mellitus with diabetic neuropathy [facility] E11.40    Stroke [facility]) I63.9    Type II diabetes mellitus with nephropathy ([facility] E11.21       Plan:   · Endoscopic procedure with sedation     Signed By: Ayesha Serrato MD     10/17/2019  2:10 PM

## 2019-10-17 NOTE — ANESTHESIA POSTPROCEDURE EVALUATION
Procedure(s):  COLONOSCOPY  ENDOSCOPIC POLYPECTOMY. MAC    Anesthesia Post Evaluation        Patient location during evaluation: PACU  Note status: Adequate. Level of consciousness: responsive to verbal stimuli and sleepy but conscious  Pain management: satisfactory to patient  Airway patency: patent  Anesthetic complications: no  Cardiovascular status: acceptable  Respiratory status: acceptable  Hydration status: acceptable  Comments: +Post-Anesthesia Evaluation and Assessment    Patient: Kartik Clark MRN: [id number]  SSN: [id number]   YOB: 1955  Age: 60 y.o. Sex: female          Cardiovascular Function/Vital Signs    /85   Pulse 96   Temp 36.8 °C (98.2 °F)   Resp 14   Ht 5' (1.524 m)   Wt 50.8 kg (112 lb)   SpO2 98%   BMI 21.87 kg/m²     Patient is status post Procedure(s):  COLONOSCOPY  ENDOSCOPIC POLYPECTOMY. Nausea/Vomiting: Controlled. Postoperative hydration reviewed and adequate. Pain:  Pain Scale 1: Numeric (0 - 10) (10/17/19 1435)  Pain Intensity 1: 0 (10/17/19 1435)   Managed. Neurological Status: At baseline. Mental Status and Level of Consciousness: Arousable. Pulmonary Status:   O2 Device: Room air (10/17/19 1435)   Adequate oxygenation and airway patent. Complications related to anesthesia: None    Post-anesthesia assessment completed. No concerns. I have evaluated the patient and the patient is stable and ready to be discharged from PACU . Signed By: Zulema Arriaga MD    10/17/2019        Vitals Value Taken Time   /82 10/17/2019  3:05 PM   Temp 36.8 °C (98.2 °F) 10/17/2019  2:35 PM   Pulse 86 10/17/2019  3:05 PM   Resp 17 10/17/2019  3:05 PM   SpO2 99 % 10/17/2019  3:05 PM   Vitals shown include unvalidated device data.

## 2019-10-17 NOTE — PROCEDURES
[address], [address] [place] [address]      Colonoscopy Operative Report    Liana Crowder  [id number]  1955      Procedure Type:   Colonoscopy with polypectomy (hot biopsy)     Indications:    Screening colonoscopy   First one    Pre-operative Diagnosis: see indication above    Post-operative Diagnosis:  See findings below    :  Dora Stacy MD    Surgical Assistant: None    Implants:  None    Referring Provider: Amaury Land MD      Sedation:  MAC anesthesia Propofol      Procedure Details:  After informed consent was obtained with all risks and benefits of procedure explained and preoperative exam completed, the patient was taken to the endoscopy suite and placed in the left lateral decubitus position. Upon sequential sedation as per above, a digital rectal exam was performed demonstrating internal hemorrhoids. The Olympus videocolonoscope  was inserted in the rectum and carefully advanced to the cecum, which was identified by the ileocecal valve and appendiceal orifice. The cecum was identified by the ileocecal valve and appendiceal orifice. The quality of preparation was good. The colonoscope was slowly withdrawn with careful evaluation between folds. Retroflexion in the rectum was completed . Findings:   Rectum: normal  Sigmoid: normal  Descending Colon: normal  Transverse Colon: 9 mm polyp removed by hot biopsy  Ascending Colon: normal  Cecum: normal        Specimen Removed:  as above    Complications: None. EBL:  None. Impression:    see findings    Recommendations: --Await pathology.       Recommendation for next colonscopy in 5 years      Signed By: Dora Stacy MD     10/17/2019  2:31 PM

## 2019-10-23 ENCOUNTER — OFFICE VISIT (OUTPATIENT)
Dept: RHEUMATOLOGY | Age: 64
End: 2019-10-23

## 2019-10-23 ENCOUNTER — HOSPITAL ENCOUNTER (OUTPATIENT)
Dept: GENERAL RADIOLOGY | Age: 64
Discharge: HOME | End: 2019-10-23
Payer: MEDICAID

## 2019-10-23 VITALS
OXYGEN SATURATION: 95 % | RESPIRATION RATE: 20 BRPM | DIASTOLIC BLOOD PRESSURE: 86 MMHG | SYSTOLIC BLOOD PRESSURE: 124 MMHG | HEART RATE: 84 BPM | HEIGHT: 60 IN | TEMPERATURE: 98.8 F | WEIGHT: 109.6 LBS | BODY MASS INDEX: 21.52 KG/M2

## 2019-10-23 DIAGNOSIS — Z86.73 S/P STROKE DUE TO CEREBROVASCULAR DISEASE: ICD-10-CM

## 2019-10-23 DIAGNOSIS — M05.9 SEROPOSITIVE RHEUMATOID ARTHRITIS (HCC): Primary | ICD-10-CM

## 2019-10-23 DIAGNOSIS — M19.041 PRIMARY OSTEOARTHRITIS OF BOTH HANDS: ICD-10-CM

## 2019-10-23 DIAGNOSIS — M05.9 SEROPOSITIVE RHEUMATOID ARTHRITIS (HCC): ICD-10-CM

## 2019-10-23 DIAGNOSIS — E11.8 CONTROLLED DIABETES MELLITUS TYPE 2 WITH COMPLICATIONS, UNSPECIFIED WHETHER LONG TERM INSULIN USE (HCC): ICD-10-CM

## 2019-10-23 DIAGNOSIS — M19.042 PRIMARY OSTEOARTHRITIS OF BOTH HANDS: ICD-10-CM

## 2019-10-23 DIAGNOSIS — M75.01 ADHESIVE CAPSULITIS OF RIGHT SHOULDER: ICD-10-CM

## 2019-10-23 PROCEDURE — 73130 X-RAY EXAM OF HAND: CPT

## 2019-10-23 PROCEDURE — 73630 X-RAY EXAM OF FOOT: CPT

## 2019-10-23 NOTE — PROGRESS NOTES
REASON FOR VISIT    This is the initial evaluation for Ms. Toro Speaker a 60 y.o.  female for question of joint pains. The patient is referred to the [facility] at the request of self. HISTORY OF PRESENT ILLNESS     Previous medical records reviewed and summarized: yes    MHAQ: 1.25  Pain scale: 9/10    This is a 60 y.o. female with hx of CVA with residual right sided hemiparesis, DM with diabetic neuropathy, HTN. Patient notes she has pain in her feet and hands as well as shoulders. She has had it for a few months. It started slowly and it is now worsening. Pain is there all the time but the intensity varies. It is worse by cold weather. She has developed deformity in her hands. She can't lift her shoulder and part of that is due to stroke. She was taking some aleve but it makes her feel sick in the stomach. It helps with the pain a little bit. She has not been on steroids. She notes her knuckles swell up. Morning stiffness for 10-15 minutes. Feet are not bothering her today. She has burning and itching at the bottom of the feet from diabetes. She has ankle pain b/l    REVIEW OF SYSTEMS    A 15 point review of systems was performed and summarized below. The questionnaire was reviewed with the patient and scanned into the patient's medical record.     General:  denies recent weight gain, recent weight loss, fatigue, weakness, fever, drenching night sweats  Musculoskeletal: endorses joint pain,morning stiffness (lasting all day), muscle pain  denies  joint swelling,   Ears: denies ringing in ears, hearing loss, deafness  Eyes: endorses  blurred vision, denies pain, light sensitive, redness, blindness, double vision,excess tearing, dryness, foreign body sensation  Mouth:  denies sore tongue, oral ulcers, loss of taste, dryness, increased dental caries  Nose: denies nosebleeds, nasal ulcers  Throat:  denies food stuck when swallowing, difficulty with swallowing, hoarseness, pain in jaw while chewing  Neck:denies swollen glands, tender glands  Cardiopulmonary: denies pain in chest with deep breaths, pain in chest when lying down, murmurs, sudden changes in heart beat, wheezing, dry cough, productive cough, shortness of breath at rest, shortness of breath on exertion, coughing of blood  Gastrointestinal: endorses nausea, heartburn,denies  stomach pain relieved by food, chronic constipation, chronic diarrhea, blood in stools, black stools  Genitourinary:denies vaginal dryness, pain or burning on urination, blood in urine, cloudy urine, vaginal ulcers, penile ulcers  Hematologic: denies anemia, bleeding tendency, blood clots, bleeding gums  Skin: denies easy bruising, hair loss, rash, rash worsened after sun exposure, hives/urticaria, skin thickening, skin tightness, nodules/bumps, color changes of hands or feet in the cold (Raynaud's)  Neurologic: endorses numbness or tingling in hands, numbness or tingling in feet, muscle weakness  denies   Psychiatric: denies depression, excessive worries, PTSD, Bipolar  Sleep: endorses poor sleep (8 hours), difficulty falling asleep, difficulty staying asleep denies  denies snoring, apnea, daytime somnolence,     PAST MEDICAL HISTORY    Past Medical History:   Diagnosis Date    Arthritis     hands/all over    Diabetes mellitus ([address] 6/15/2015    Dyspepsia and other specified disorders of function of stomach     Foot pain, bilateral 6/15/2015    Hemiparesis affecting left side as late effect of cerebrovascular accident ([address] 4/18/2014    Hypercholesteremia 4/18/2014    Hypertension     Knee pain, bilateral 6/15/2015    Lipoma of back 5/14/2015    Nausea & vomiting     Prediabetes 5/14/2015    S/P stroke due to cerebrovascular disease 4/18/2014    Screening for breast cancer 9/15/2015    Type II diabetes mellitus with nephropathy ([address] 7/23/2019        Past Surgical History:   Procedure Laterality Date    ABDOMEN SURGERY PROC UNLISTED  2004    hernia repair    COLONOSCOPY N/A 10/17/2019    COL[facility]COPY performed by Ye Dover [facility] at [facility] END[facility]COPY    HX OTHER SURGICAL      lipoma removed from back    HX TUBAL LIGATION  1984       FAMILY HISTORY    Family History   Problem Relation Age of Onset    Hypertension Mother     Diabetes Mother     Arthritis-rheumatoid Father     Hypertension Brother     Stroke Brother        SOCIAL HISTORY    Social History     Tobacco Use    Smoking status: Former Smoker     Packs/day: 0.25     Years: 35.00     Pack years: 8.75     Last attempt to quit: 5/28/2009     Years since quitting: 10.4    Smokeless tobacco: Never Used   Substance Use Topics    Alcohol use: No     Comment: ETOH abuse in past; quit 2012    Drug use: No       MEDICATIONS    Current Outpatient Medications   Medication Sig Dispense Refill    metoprolol tartrate (LOPRESSOR) 25 mg tablet TAKE 1 TABLET BY MOUTH TWICE A DAY 60 Tab 6    lisinopril-hydroCHLOROthiazide (PRINZIDE, ZESTORETIC) 20-25 mg per tablet Take 1 Tab by mouth daily. Patient requires an office visit for additional refills. 90 Tab 1    metFORMIN (GLUCOPHAGE) 500 mg tablet Take 0.5 Tabs by mouth two (2) times daily (with meals). 90 Tab 3    simvastatin (ZOCOR) 10 mg tablet TAKE 1 TABLET BY MOUTH EVERY NIGHT PATIENT REQUIRES AN OFFICE VISIT FOR ADDITIONAL REFILLS. 90 Tab 1    cloNIDine HCl (CATAPRES) 0.2 mg tablet TAKE 1 TABLET BY MOUTH EVERY NIGHT 90 Tab 1    aspirin 81 mg chewable tablet Take 1 Tab by mouth daily.  30 Tab 11    cloNIDine HCl (CATAPRES) 0.2 mg tablet TAKE 1 TABLET BY MOUTH EVERY DAY AT NIGHT 30 Tab 1       ALLERGIES    Allergies   Allergen Reactions    Codeine Nausea and Vomiting       PHYSICAL EXAMINATION    Visit Vitals  /86 (BP 1 Location: Right arm, BP Patient Position: Sitting)   Pulse 84   Temp 98.8 °F (37.1 °C) (Oral)   Resp 20   Ht 5' (1.524 m)   Wt 109 lb 9.6 oz (49.7 kg)   SpO2 95%   BMI 21.40 kg/m²     Body mass index is 21.4 kg/m². General: NAD  HEENT: PERRL, anicteric, non-injected sclerae; oropharynx without ulcers, erythema, or exudate. Moist mucous membranes. Lymphatic: No cervical or axillary lymphadenopathy. Cardiovascular: S1, S2,no R/M/G  Pulmonary: CTA b/l. No wheezes/rales/rhonchi. Abdominal: Soft,NTND, + BS. Skin: No rash, nodules, or periungual changes. Neuro: Alert; able to carry normal conversation    Musculoskeletal:   Right shoulder with mild swelling. Very minimal ROM actively and passively  Right hand with some ulnar deviation and all digits are in flexion contractures at the MCP which are only partially reducible. Hypertrophy of right MCPs and right wrist appears to have fused with no active swelling  Left wrist also with fusion with no active swelling. Left MCPs with hypertrophy as well  Mild swelling in b/l ankles  TTP of MTP joints    DATA REVIEW    Prior medical records were reviewed and if applicable are summarized as below:    Labs:   7/2019: cbc, CMP, TSH normal    Imaging:   N/A    ASSESSMENT AND PLAN    A 60 y.o. female with hx of CVA with right side hemiparesis, DM who presents today for evaluation of b/l hand and foot pain with significant deformities. The differential includes rheumatoid arthritis, another inflammatory arthritis,  CPPD or erosive osteoarthritis. Further, she has right sided hemiparesis so I am also concerned about neurological lower motor neuron disorder causing joint contractures. Diabetes can also cause joint contractures.      # Hand deformities:  - RF, CCP, ESR, CRP  - b/l hand and foot x-rays  - if unremarkable work up, will do EMG to evaluate further  - can do a trial of small dose of prednisone at next visit to assess for improvement    # Adhesive capsulitis of right shoulder:  - due to right hemiparesis most likely    # Diabetes:  - defer to PCP    # Med toxicity:  - hepatitis panel and quant gold    RTC in 3 weeks    The patient voiced understanding of the aforementioned assessment and plan. Summary of plan was provided in the After Visit Summary patient instructions. I also provided education about MyChart setup and utility. Ms. Kings Cid has a reminder for a \"due or due soon\" health maintenance. I have asked that she contact her primary care provider for follow-up on this health maintenance.     TODAY'S ORDERS    Orders Placed This Encounter    QUANTIFERON-TB GOLD PLUS    XR HAND RT MIN 3 V    XR HAND LT MIN 3 V    XR FOOT RT MIN 3 V    XR FOOT LT MIN 3 V    CYCLIC CITRUL PEPTIDE AB, IGG    METABOLIC PANEL, COMPREHENSIVE    C REACTIVE PROTEIN, QT    SED RATE (ESR)    CHRONIC HEPATITIS PANEL    RHEUMATOID FACTOR, QL       Future Appointments   Date Time Provider Department Center   11/19/2019 10:00 AM Haritha José  Maria Alejandra Gibson MD    Adult Rheumatology   [facility]  A Part of [facility], [address] [facility]   Phone [phone]  Fax [phone]

## 2019-10-23 NOTE — PATIENT INSTRUCTIONS
Please fill out your [address] you will receive after your visit in the mail or via [address]

## 2019-10-25 LAB
ALBUMIN SERPL-MCNC: 4.1 G/DL (ref 3.6–4.8)
ALBUMIN/GLOB SERPL: 1.5 {RATIO} (ref 1.2–2.2)
ALP SERPL-CCNC: 94 IU/L (ref 39–117)
ALT SERPL-CCNC: 11 IU/L (ref 0–32)
AST SERPL-CCNC: 12 IU/L (ref 0–40)
BILIRUB SERPL-MCNC: 0.4 MG/DL (ref 0–1.2)
BUN SERPL-MCNC: 19 MG/DL (ref 8–27)
BUN/CREAT SERPL: 24 (ref 12–28)
CALCIUM SERPL-MCNC: 9.3 MG/DL (ref 8.7–10.3)
CCP IGA+IGG SERPL IA-ACNC: 241 UNITS (ref 0–19)
CHLORIDE SERPL-SCNC: 102 MMOL/L (ref 96–106)
CO2 SERPL-SCNC: 23 MMOL/L (ref 20–29)
COMMENT, 144067: NORMAL
CREAT SERPL-MCNC: 0.79 MG/DL (ref 0.57–1)
CRP SERPL-MCNC: 6 MG/L (ref 0–10)
ERYTHROCYTE [SEDIMENTATION RATE] IN BLOOD BY WESTERGREN METHOD: 61 MM/HR (ref 0–40)
GAMMA INTERFERON BACKGROUND BLD IA-ACNC: 0.11 IU/ML
GLOBULIN SER CALC-MCNC: 2.8 G/DL (ref 1.5–4.5)
GLUCOSE SERPL-MCNC: 116 MG/DL (ref 65–99)
HBV CORE AB SERPL QL IA: NEGATIVE
HBV CORE IGM SERPL QL IA: NEGATIVE
HBV E AB SERPL QL IA: NEGATIVE
HBV E AG SERPL QL IA: NEGATIVE
HBV SURFACE AB SER QL: NON REACTIVE
HBV SURFACE AG SERPL QL IA: NEGATIVE
HCV AB S/CO SERPL IA: <0.1 S/CO RATIO (ref 0–0.9)
M TB IFN-G BLD-IMP: NEGATIVE
M TB IFN-G CD4+ BCKGRND COR BLD-ACNC: 0.05 IU/ML
MITOGEN IGNF BLD-ACNC: >10 IU/ML
POTASSIUM SERPL-SCNC: 4.2 MMOL/L (ref 3.5–5.2)
PROT SERPL-MCNC: 6.9 G/DL (ref 6–8.5)
QUANTIFERON INCUBATION, QF1T: NORMAL
QUANTIFERON TB2 AG: 0.05 IU/ML
RHEUMATOID FACT SERPL-ACNC: 46.5 IU/ML (ref 0–13.9)
SERVICE CMNT-IMP: NORMAL
SODIUM SERPL-SCNC: 139 MMOL/L (ref 134–144)

## 2019-11-07 ENCOUNTER — OFFICE VISIT (OUTPATIENT)
Dept: FAMILY MEDICINE CLINIC | Age: 64
End: 2019-11-07

## 2019-11-07 VITALS
RESPIRATION RATE: 20 BRPM | OXYGEN SATURATION: 99 % | SYSTOLIC BLOOD PRESSURE: 96 MMHG | BODY MASS INDEX: 21.89 KG/M2 | TEMPERATURE: 97.8 F | HEART RATE: 78 BPM | HEIGHT: 60 IN | DIASTOLIC BLOOD PRESSURE: 65 MMHG | WEIGHT: 111.5 LBS

## 2019-11-07 DIAGNOSIS — Z12.31 ENCOUNTER FOR SCREENING MAMMOGRAM FOR MALIGNANT NEOPLASM OF BREAST: ICD-10-CM

## 2019-11-07 DIAGNOSIS — I10 HTN, GOAL BELOW 130/80: ICD-10-CM

## 2019-11-07 DIAGNOSIS — M25.561 PAIN IN BOTH KNEES, UNSPECIFIED CHRONICITY: ICD-10-CM

## 2019-11-07 DIAGNOSIS — R92.8 ABNORMAL MAMMOGRAM: ICD-10-CM

## 2019-11-07 DIAGNOSIS — M79.642 PAIN IN BOTH HANDS: ICD-10-CM

## 2019-11-07 DIAGNOSIS — G57.93 NEUROPATHIC PAIN OF BOTH FEET: ICD-10-CM

## 2019-11-07 DIAGNOSIS — Z86.73 S/P STROKE DUE TO CEREBROVASCULAR DISEASE: ICD-10-CM

## 2019-11-07 DIAGNOSIS — M25.511 CHRONIC RIGHT SHOULDER PAIN: ICD-10-CM

## 2019-11-07 DIAGNOSIS — I10 ESSENTIAL HYPERTENSION, MALIGNANT: ICD-10-CM

## 2019-11-07 DIAGNOSIS — G89.29 CHRONIC RIGHT SHOULDER PAIN: ICD-10-CM

## 2019-11-07 DIAGNOSIS — Z23 ENCOUNTER FOR IMMUNIZATION: ICD-10-CM

## 2019-11-07 DIAGNOSIS — E11.9 DIABETES MELLITUS TYPE 2, CONTROLLED (HCC): ICD-10-CM

## 2019-11-07 DIAGNOSIS — M79.641 PAIN IN BOTH HANDS: ICD-10-CM

## 2019-11-07 DIAGNOSIS — E78.00 HYPERCHOLESTEREMIA: ICD-10-CM

## 2019-11-07 DIAGNOSIS — M25.562 PAIN IN BOTH KNEES, UNSPECIFIED CHRONICITY: ICD-10-CM

## 2019-11-07 DIAGNOSIS — I69.354 HEMIPARESIS AFFECTING LEFT SIDE AS LATE EFFECT OF CEREBROVASCULAR ACCIDENT (HCC): ICD-10-CM

## 2019-11-07 RX ORDER — METOPROLOL TARTRATE 25 MG/1
TABLET, FILM COATED ORAL
Qty: 60 TAB | Refills: 6 | Status: SHIPPED | OUTPATIENT
Start: 2019-11-07 | End: 2020-04-27 | Stop reason: SDUPTHER

## 2019-11-07 RX ORDER — GABAPENTIN 100 MG/1
100 CAPSULE ORAL 3 TIMES DAILY
Qty: 60 CAP | Refills: 2 | Status: SHIPPED | OUTPATIENT
Start: 2019-11-07 | End: 2019-12-13 | Stop reason: SDUPTHER

## 2019-11-07 RX ORDER — METFORMIN HYDROCHLORIDE 500 MG/1
250 TABLET ORAL 2 TIMES DAILY WITH MEALS
Qty: 90 TAB | Refills: 3 | Status: SHIPPED | OUTPATIENT
Start: 2019-11-07 | End: 2019-12-13 | Stop reason: SDUPTHER

## 2019-11-07 RX ORDER — LISINOPRIL AND HYDROCHLOROTHIAZIDE 20; 25 MG/1; MG/1
1 TABLET ORAL DAILY
Qty: 90 TAB | Refills: 1 | Status: SHIPPED | OUTPATIENT
Start: 2019-11-07 | End: 2020-04-27 | Stop reason: SDUPTHER

## 2019-11-07 RX ORDER — CLONIDINE HYDROCHLORIDE 0.2 MG/1
TABLET ORAL
Qty: 90 TAB | Refills: 1 | Status: SHIPPED | OUTPATIENT
Start: 2019-11-07 | End: 2020-04-27 | Stop reason: SDUPTHER

## 2019-11-07 RX ORDER — BACLOFEN 10 MG/1
10 TABLET ORAL
Qty: 40 TAB | Refills: 2 | Status: SHIPPED | OUTPATIENT
Start: 2019-11-07 | End: 2019-12-13 | Stop reason: ALTCHOICE

## 2019-11-07 NOTE — PATIENT INSTRUCTIONS
Vaccine Information Statement    Influenza (Flu) Vaccine (Inactivated or Recombinant): What You Need to Know    Many Vaccine Information Statements are available in [place]ic and other languages. See www.immunize.org/vis  Hojas de información sobre vacunas están disponibles en español y en muchos otros idiomas. Visite www.immunize.org/vis    1. Why get vaccinated? Influenza vaccine can prevent influenza (flu). Flu is a contagious disease that spreads around the [facility] every year, usually between October and May. Anyone can get the flu, but it is more dangerous for some people. Infants and young children, people 72years of age and older, pregnant women, and people with certain health conditions or a weakened immune system are at greatest risk of flu complications. Pneumonia, bronchitis, sinus infections and ear infections are examples of flu-related complications. If you have a medical condition, such as heart disease, cancer or diabetes, flu can make it worse. Flu can cause fever and chills, sore throat, muscle aches, fatigue, cough, headache, and runny or stuffy nose. Some people may have vomiting and diarrhea, though this is more common in children than adults. Each year thousands of people in the [facility] die from flu, and many more are hospitalized. Flu vaccine prevents millions of illnesses and flu-related visits to the doctor each year. 2. Influenza vaccines     CDC recommends everyone 10months of age and older get vaccinated every flu season. Children 6 months through 6years of age may need 2 doses during a single flu season. Everyone else needs only 1 dose each flu season. It takes about 2 weeks for protection to develop after vaccination. There are many flu viruses, and they are always changing. Each year a new flu vaccine is made to protect against three or four viruses that are likely to cause disease in the upcoming flu season.  Even when the vaccine doesnt exactly match these viruses, it may still provide some protection. Influenza vaccine does not cause flu. Influenza vaccine may be given at the same time as other vaccines. 3. Talk with your health care provider    Tell your vaccine provider if the person getting the vaccine:   Has had an allergic reaction after a previous dose of influenza vaccine, or has any severe, life-threatening allergies.  Has ever had Guillain-Barré Syndrome (also called GBS). In some cases, your health care provider may decide to postpone influenza vaccination to a future visit. People with minor illnesses, such as a cold, may be vaccinated. People who are moderately or severely ill should usually wait until they recover before getting influenza vaccine. Your health care provider can give you more information. 4. Risks of a reaction     Soreness, redness, and swelling where shot is given, fever, muscle aches, and headache can happen after influenza vaccine.  There may be a very small increased risk of Guillain-Barré Syndrome (GBS) after inactivated influenza vaccine (the flu shot). [facility] children who get the flu shot along with pneumococcal vaccine (PCV13), and/or DTaP vaccine at the same time might be slightly more likely to have a seizure caused by fever. Tell your health care provider if a child who is getting flu vaccine has ever had a seizure. People sometimes faint after medical procedures, including vaccination. Tell your provider if you feel dizzy or have vision changes or ringing in the ears. As with any medicine, there is a very remote chance of a vaccine causing a severe allergic reaction, other serious injury, or death. 5. What if there is a serious problem? An allergic reaction could occur after the vaccinated person leaves the clinic.  If you see signs of a severe allergic reaction (hives, swelling of the face and throat, difficulty breathing, a fast heartbeat, dizziness, or weakness), call 9-1-1 and get the person to the nearest hospital.    For other signs that concern you, call your health care provider. Adverse reactions should be reported to the Vaccine Adverse Event Reporting System (VAERS). Your health care provider will usually file this report, or you can do it yourself. Visit the VAERS website at www.vaers. [facility].gov or call 3-652.343.1449. VAERS is only for reporting reactions, and VAERS staff do not give medical advice. 6. The National Vaccine Injury Compensation Program    The [facility] Vaccine Injury Compensation Program (VICP) is a federal program that was created to compensate people who may have been injured by certain vaccines. Visit the VICP website at www.[facility].gov/vaccinecompensation or call 5-286.557.6166 to learn about the program and about filing a claim. There is a time limit to file a claim for compensation. 7. How can I learn more?  Ask your health care provider.  Call your local or state health department.  Contact the Centers for Disease Control and Prevention (CDC):  - Call 7-729.571.7613 (1-800-CDC-INFO) or  - Visit CDCs influenza website at www.cdc.gov/flu    Vaccine Information Statement (Interim)  Inactivated Influenza Vaccine   8/15/2019  [address] Griffin [id number]   Department of Health and Human Services  Centers for Disease Control and Prevention    Office Use Only

## 2019-11-07 NOTE — PROGRESS NOTES
Name and  verified        Chief Complaint   Patient presents with   [place] Results         Health Maintenance reviewed-discussed with patient. 1. Have you been to the ER, urgent care clinic since your last visit? Hospitalized since your last visit? no    2. Have you seen or consulted any other health care providers outside of the [address] [place] [address] since your last visit? Include any pap smears or colon screening. No        Order placed for flu vaccine, per Verbal Order from Dr. Fabián Lindsey on 2019 due to HM. After obtaining consent, and per orders of Dr Fabián Lindsey, flu vaccine was given to  Left deltoid. Patient was instructed to remain in clinic for 15 minutes afterwards, and to report any adverse reaction to me immediately. Patient did not have any adverse reactions during this office visit.

## 2019-11-08 LAB
EST. AVERAGE GLUCOSE BLD GHB EST-MCNC: 160 MG/DL
HBA1C MFR BLD: 7.2 % (ref 4.8–5.6)

## 2019-11-19 ENCOUNTER — OFFICE VISIT (OUTPATIENT)
Dept: RHEUMATOLOGY | Age: 64
End: 2019-11-19

## 2019-11-19 VITALS
RESPIRATION RATE: 18 BRPM | WEIGHT: 110 LBS | SYSTOLIC BLOOD PRESSURE: 102 MMHG | HEIGHT: 60 IN | TEMPERATURE: 98.5 F | BODY MASS INDEX: 21.6 KG/M2 | HEART RATE: 72 BPM | OXYGEN SATURATION: 98 % | DIASTOLIC BLOOD PRESSURE: 71 MMHG

## 2019-11-19 DIAGNOSIS — E11.8 CONTROLLED DIABETES MELLITUS TYPE 2 WITH COMPLICATIONS, UNSPECIFIED WHETHER LONG TERM INSULIN USE (HCC): ICD-10-CM

## 2019-11-19 DIAGNOSIS — Z86.73 S/P STROKE DUE TO CEREBROVASCULAR DISEASE: ICD-10-CM

## 2019-11-19 DIAGNOSIS — M05.9 SEROPOSITIVE RHEUMATOID ARTHRITIS (HCC): ICD-10-CM

## 2019-11-19 DIAGNOSIS — Z79.60 LONG-TERM USE OF IMMUNOSUPPRESSANT MEDICATION: Primary | ICD-10-CM

## 2019-11-19 DIAGNOSIS — M75.01 ADHESIVE CAPSULITIS OF RIGHT SHOULDER: ICD-10-CM

## 2019-11-19 RX ORDER — PREDNISONE 20 MG/1
20 TABLET ORAL DAILY
Qty: 60 TAB | Refills: 0 | Status: SHIPPED | OUTPATIENT
Start: 2019-11-19 | End: 2019-12-17 | Stop reason: ALTCHOICE

## 2019-11-19 RX ORDER — METHOTREXATE 2.5 MG/1
15 TABLET ORAL
Qty: 90 TAB | Refills: 0 | Status: SHIPPED | OUTPATIENT
Start: 2019-11-23 | End: 2020-02-21 | Stop reason: SDUPTHER

## 2019-11-19 RX ORDER — FOLIC ACID 1 MG/1
1 TABLET ORAL DAILY
Qty: 90 TAB | Refills: 0 | Status: SHIPPED | OUTPATIENT
Start: 2019-11-19 | End: 2020-02-21 | Stop reason: SDUPTHER

## 2019-11-19 NOTE — PATIENT INSTRUCTIONS
Please fill out your [address] you will receive after your visit in the mail or via [address] I am starting you on methotrexate. Please take 6 pills all together once a week.    Take daily folic acid  Take prednisone 20 mg oral daily  Take caltrate 1 tab daily

## 2019-11-19 NOTE — PROGRESS NOTES
HISTORY OF PRESENT ILLNESS  Trish Perez is a 60 y.o. female. HPI  DMtype II  Patient also present for diabetic check,  the patient has been compliancy w/ meds, patient also states that the pt is trying to have a diabetic diet, and eat less of carbohydrates, since last visit patient has been more active with daily walking,  , +++ Rf needed for today. This time patient denies  tingling sensation, has no polyurea and polydipsia, last a1c was almost at target of 6.3 on last visit and this visit is at 7.2 % %       Wrist Pain    The history is provided by the patient. This is a recurrent problem. The current episode started more than 1 week ago. The problem occurs constantly. The problem has not changed since onset. The pain is present in the right wrist. The quality of the pain is described as dull. The pain is at a severity of 6/10. Associated symptoms include numbness, limited range of motion, stiffness, tingling and itching. HTN  Today pt present for Bp check and++= Compliancy w/ the bp meds, having had the low salt diet ,  has been active, patien does not obtain the bp at home ,, today the pt denies Chest Pain, has no legs swelling no lightheadedness,    Current Outpatient Medications   Medication Sig Dispense Refill    cloNIDine HCl (CATAPRES) 0.2 mg tablet TAKE 1 TABLET BY MOUTH EVERY NIGHT 90 Tab 1    lisinopril-hydroCHLOROthiazide (PRINZIDE, ZESTORETIC) 20-25 mg per tablet Take 1 Tab by mouth daily. Patient requires an office visit for additional refills. 90 Tab 1    metFORMIN (GLUCOPHAGE) 500 mg tablet Take 0.5 Tabs by mouth two (2) times daily (with meals). 90 Tab 3    metoprolol tartrate (LOPRESSOR) 25 mg tablet TAKE 1 TABLET BY MOUTH TWICE A DAY 60 Tab 6    gabapentin (NEURONTIN) 100 mg capsule Take 1 Cap by mouth three (3) times daily. Max Daily Amount: 300 mg.  Indications: Neuropathic Pain 60 Cap 2    baclofen (LIORESAL) 10 mg tablet Take 1 Tab by mouth two (2) times daily as needed for Pain. 40 Tab 2    simvastatin (ZOCOR) 10 mg tablet TAKE 1 TABLET BY MOUTH EVERY NIGHT PATIENT REQUIRES AN OFFICE VISIT FOR ADDITIONAL REFILLS. 90 Tab 1    aspirin 81 mg chewable tablet Take 1 Tab by mouth daily.  30 Tab 11     Allergies   Allergen Reactions    Codeine Nausea and Vomiting     Past Medical History:   Diagnosis Date    Arthritis     hands/all over    Diabetes mellitus ([facility] 6/15/2015    Dyspepsia and other specified disorders of function of stomach     Foot pain, bilateral 6/15/2015    Hemiparesis affecting left side as late effect of cerebrovascular accident ([facility] 4/18/2014    Hypercholesteremia 4/18/2014    Hypertension     Knee pain, bilateral 6/15/2015    Lipoma of back 5/14/2015    Nausea & vomiting     Prediabetes 5/14/2015    S/P stroke due to cerebrovascular disease 4/18/2014    Screening for breast cancer 9/15/2015    Type II diabetes mellitus with nephropathy ([facility] 7/23/2019     Past Surgical History:   Procedure Laterality Date    ABDOMEN SURGERY PROC UNLISTED  2004    hernia repair    COLONOSCOPY N/A 10/17/2019    COL[facility]COPY performed by Al[facility] at [facility] ENDOSCOPY    HX OTHER SURGICAL      lipoma removed from back    HX TUBAL LIGATION  1984     Family History   Problem Relation Age of Onset    Hypertension Mother    Reserve  Diabetes Mother     Arthritis-rheumatoid Father     Hypertension Brother     Stroke Brother      Social History     Tobacco Use    Smoking status: Former Smoker     Packs/day: 0.25     Years: 35.00     Pack years: 8.75     Last attempt to quit: 5/28/2009     Years since quitting: 10.4    Smokeless tobacco: Never Used   Substance Use Topics    Alcohol use: No     Comment: ETOH abuse in past; quit 2012      Lab Results   Component Value Date/Time    WBC 7.6 07/16/2019 03:42 PM    HGB 11.6 07/16/2019 03:42 PM    HCT 36.2 07/16/2019 03:42 PM    PLATELET 623 95/39/6134 03:42 PM    MCV 85 07/16/2019 03:42 PM     Lab Results   Component Value Date/Time    GFR est non-AA 80 10/23/2019 02:16 PM    GFR est AA 93 10/23/2019 02:16 PM    Creatinine 0.79 10/23/2019 02:16 PM    BUN 19 10/23/2019 02:16 PM    Sodium 139 10/23/2019 02:16 PM    Potassium 4.2 10/23/2019 02:16 PM    Chloride 102 10/23/2019 02:16 PM    CO2 23 10/23/2019 02:16 PM        Review of Systems   Constitutional: Negative for chills and fever. HENT: Negative for ear pain and nosebleeds. Eyes: Negative for blurred vision, pain and discharge. Respiratory: Negative for shortness of breath. Cardiovascular: Negative for chest pain and leg swelling. Gastrointestinal: Negative for constipation, diarrhea, nausea and vomiting. Genitourinary: Negative for frequency. Musculoskeletal: Positive for joint pain and myalgias. Skin: Negative for itching and rash. Neurological: Negative for headaches. Psychiatric/Behavioral: Negative for depression. The patient is not nervous/anxious. Physical Exam   Constitutional: She is oriented to person, place, and time. She appears well-developed and well-nourished. HENT:   Head: Normocephalic and atraumatic. Eyes: Conjunctivae and EOM are normal.   Neck: Normal range of motion. Neck supple. Cardiovascular: Normal rate, regular rhythm and normal heart sounds. No murmur heard. Pulmonary/Chest: Effort normal and breath sounds normal.   Abdominal: Soft. Bowel sounds are normal. She exhibits no distension. Musculoskeletal: Normal range of motion. She exhibits tenderness. She exhibits no edema. Neurological: She is alert and oriented to person, place, and time. Skin: No erythema. Psychiatric: Her behavior is normal.   Nursing note and vitals reviewed. ASSESSMENT and PLAN  Diagnoses and all orders for this visit:    1.  Type II diabetes mellitus with complication, uncontrolled (HCC)  -     REFERRAL TO OPTOMETRY  -     metoprolol tartrate (LOPRESSOR) 25 mg tablet; TAKE 1 TABLET BY MOUTH TWICE A DAY    2. Encounter for immunization  -     INFLUENZA VIRUS VAC QUAD,SPLIT,PRESV FREE SYRINGE IM  -     TX IMMUNIZ ADMIN,1 SINGLE/COMB VAC/TOXOID  -     varicella-zoster recombinant, PF, (SHINGRIX) 50 mcg/0.5 mL susr injection; 0.5 mL by IntraMUSCular route once for 1 dose.  -     REFERRAL TO PODIATRY    3. Encounter for screening mammogram for malignant neoplasm of breast  -     BEV MAMMOGRAM DIAG BILAT SAME DAY INCL CAD; Future    4. Neuropathic pain of both feet    5. Pain in both knees, unspecified chronicity    6. Hemiparesis affecting left side as late effect of cerebrovascular accident ([facility]  -     cloNIDine HCl (CATAPRES) 0.2 mg tablet; TAKE 1 TABLET BY MOUTH EVERY NIGHT    7. Hypercholesteremia  -     cloNIDine HCl (CATAPRES) 0.2 mg tablet; TAKE 1 TABLET BY MOUTH EVERY NIGHT  -     metFORMIN (GLUCOPHAGE) 500 mg tablet; Take 0.5 Tabs by mouth two (2) times daily (with meals). 8. S/P stroke due to cerebrovascular disease  -     cloNIDine HCl (CATAPRES) 0.2 mg tablet; TAKE 1 TABLET BY MOUTH EVERY NIGHT  -     gabapentin (NEURONTIN) 100 mg capsule; Take 1 Cap by mouth three (3) times daily. Max Daily Amount: 300 mg. Indications: Neuropathic Pain  -     REFERRAL TO PHYSICAL THERAPY  -     HEMOGLOBIN A1C WITH EAG    9. Essential hypertension, malignant  -     lisinopril-hydroCHLOROthiazide (PRINZIDE, ZESTORETIC) 20-25 mg per tablet; Take 1 Tab by mouth daily. Patient requires an office visit for additional refills. 10. Diabetes mellitus type 2, controlled ([facility]  -     metFORMIN (GLUCOPHAGE) 500 mg tablet; Take 0.5 Tabs by mouth two (2) times daily (with meals). -     gabapentin (NEURONTIN) 100 mg capsule; Take 1 Cap by mouth three (3) times daily. Max Daily Amount: 300 mg. Indications: Neuropathic Pain  -     REFERRAL TO PHYSICAL THERAPY  -     HEMOGLOBIN A1C WITH EAG    11. HTN, goal below 130/80  -     metoprolol tartrate (LOPRESSOR) 25 mg tablet; TAKE 1 TABLET BY MOUTH TWICE A DAY    12.  Chronic right shoulder pain  - gabapentin (NEURONTIN) 100 mg capsule; Take 1 Cap by mouth three (3) times daily. Max Daily Amount: 300 mg. Indications: Neuropathic Pain  -     REFERRAL TO PHYSICAL THERAPY  -     HEMOGLOBIN A1C WITH EAG    13. Pain in both hands  -     gabapentin (NEURONTIN) 100 mg capsule; Take 1 Cap by mouth three (3) times daily. Max Daily Amount: 300 mg. Indications: Neuropathic Pain  -     REFERRAL TO PHYSICAL THERAPY  -     HEMOGLOBIN A1C WITH EAG    14. Abnormal mammogram  -     [facility] MAMMOGRAM DIAG BILAT SAME DAY INCL CAD; Future    Other orders  -     baclofen (LIORESAL) 10 mg tablet; Take 1 Tab by mouth two (2) times daily as needed for Pain.

## 2019-11-19 NOTE — PROGRESS NOTES
REASON FOR VISIT    Patient presents for a follow up visit for    ICD-10-CM ICD-9-CM    1. Long-term use of immunosuppressant medication Z79.899 V58.69    2. Seropositive rheumatoid arthritis ([facility] M05.9 714.0    3. S/P stroke due to cerebrovascular disease Z86.73 V12.54    4. Controlled diabetes mellitus type 2 with complications, unspecified whether long term insulin use ([facility]) E11.8 250.90    5. Adhesive capsulitis of right shoulder M75.01 726.0        HISTORY OF DISEASE: Seropositive erosive rheumatoid arthritis    Year of diagnosis: 2019  First visit with me: 10/2019  Cumulative disease manifestations: erosions  Positive serologies/labs: RF, CCP  Negative serologies/labs: Other co-morbidities: CVA with residual right sided hemiparesis, DM with neuropathy, HTN  Relapses:      Past treatment history:  Prednisone:   Non-biologic DMARD:   Biologic:  Other:    HISTORY OF PRESENT ILLNESS     Previous medical records reviewed and summarized: yes    MHAQ: 1.25  Pain scale: 9/10    The patient notes she has a lot of joint pain. She has pain in feet and hands. The pain is there all the time.      REVIEW OF SYSTEMS    Positives as per history  Negatives as follows:  [employer] Service:    Denies unexplained persistent fevers, weight change, chronic fatigue  HEAD/EYES:              Denies eye redness, blurry vision or sudden loss of vision, dry eyes, HA, temporal artery pain  ENT:                            Denies oral/nasal ulcers, recurrent sinus infections, dry mouth  RESPIRATORY:         No pleuritic pain, history of pleural effusions, hemoptysis, exertional dyspnea  CARDIOVASCULAR:             Denies chest pain, history of pericardial effusions  GASTRO:                    Denies heartburn, esophageal dysmotility, abdominal pain, nausea, vomiting, diarrhea, blood in the stool  HEMATOLOGIC:        No easy bruising, purpura, swollen lymph nodes  SKIN:                           Denies alopecia, ulcers, nodules, sun sensitivity, unexplained persistent rash   VASCULAR:                Denies edema, cyanosis, raynaud phenomenon  NEUROLOGIC:           Denies specific muscle weakness, paresthesias   PSYCHIATRIC:           No sleep disturbance / snoring, depression, anxiety  MSK:                           No morning stiffness >1 hour, SI joint pain, persistent joint swelling, persistent joint pain    PAST MEDICAL HISTORY    Past Medical History:   Diagnosis Date    Arthritis     hands/all over    Diabetes mellitus ([facility] 6/15/2015    Dyspepsia and other specified disorders of function of stomach     Foot pain, bilateral 6/15/2015    Hemiparesis affecting left side as late effect of cerebrovascular accident ([address] 4/18/2014    Hypercholesteremia 4/18/2014    Hypertension     Knee pain, bilateral 6/15/2015    Lipoma of back 5/14/2015    Nausea & vomiting     Prediabetes 5/14/2015    S/P stroke due to cerebrovascular disease 4/18/2014    Screening for breast cancer 9/15/2015    Type II diabetes mellitus with nephropathy ([facility] 7/23/2019        Past Surgical History:   Procedure Laterality Date    ABDOMEN SURGERY PROC UNLISTED  2004    hernia repair    COLONOSCOPY N/A 10/17/2019    COLONOSCOPY performed by Joey Lozano MD at [facility] ENDOSCOPY    HX OTHER SURGICAL      lipoma removed from back    HX TUBAL LIGATION  1984       FAMILY HISTORY    Family History   Problem Relation Age of Onset    Hypertension Mother     Diabetes Mother     Arthritis-rheumatoid Father     Hypertension Brother     Stroke Brother        SOCIAL HISTORY    Social History     Tobacco Use    Smoking status: Former Smoker     Packs/day: 0.25     Years: 35.00     Pack years: 8.75     Last attempt to quit: 5/28/2009     Years since quitting: 10.4    Smokeless tobacco: Never Used   Substance Use Topics    Alcohol use: No     Comment: ETOH abuse in past; quit 2012    Drug use: No       MEDICATIONS    Current Outpatient Medications   Medication Sig Dispense Refill    predniSONE (DELTASONE) 20 mg tablet Take 20 mg by mouth daily. 60 Tab 0    [START ON 11/23/2019] methotrexate (RHEUMATREX) 2.5 mg tablet Take 6 Tabs by mouth Every Saturday. 90 Tab 0    folic acid (FOLVITE) 1 mg tablet Take 1 Tab by mouth daily. 90 Tab 0    calcium carbonate-vitamin D3 (CALTRATE 600 PLUS D) 600 mg (1,500 mg)-800 unit chew Take 1 Tab by mouth daily. 90 Tab 3    cloNIDine HCl (CATAPRES) 0.2 mg tablet TAKE 1 TABLET BY MOUTH EVERY NIGHT 90 Tab 1    lisinopril-hydroCHLOROthiazide (PRINZIDE, ZESTORETIC) 20-25 mg per tablet Take 1 Tab by mouth daily. Patient requires an office visit for additional refills. 90 Tab 1    metFORMIN (GLUCOPHAGE) 500 mg tablet Take 0.5 Tabs by mouth two (2) times daily (with meals). 90 Tab 3    metoprolol tartrate (LOPRESSOR) 25 mg tablet TAKE 1 TABLET BY MOUTH TWICE A DAY 60 Tab 6    gabapentin (NEURONTIN) 100 mg capsule Take 1 Cap by mouth three (3) times daily. Max Daily Amount: 300 mg. Indications: Neuropathic Pain 60 Cap 2    baclofen (LIORESAL) 10 mg tablet Take 1 Tab by mouth two (2) times daily as needed for Pain. 40 Tab 2    simvastatin (ZOCOR) 10 mg tablet TAKE 1 TABLET BY MOUTH EVERY NIGHT PATIENT REQUIRES AN OFFICE VISIT FOR ADDITIONAL REFILLS. 90 Tab 1    aspirin 81 mg chewable tablet Take 1 Tab by mouth daily. 30 Tab 11       ALLERGIES    Allergies   Allergen Reactions    Codeine Nausea and Vomiting       PHYSICAL EXAMINATION    Visit Vitals  /71 (BP 1 Location: Left arm, BP Patient Position: Sitting)   Pulse 72   Temp 98.5 °F (36.9 °C) (Oral)   Resp 18   Ht 5' (1.524 m)   Wt 110 lb (49.9 kg)   SpO2 98%   BMI 21.48 kg/m²     Body mass index is 21.48 kg/m². General: NAD  HEENT: PERRL, anicteric, non-injected sclerae; oropharynx without ulcers, erythema, or exudate. Moist mucous membranes. Lymphatic: No cervical or axillary lymphadenopathy. Cardiovascular: S1, S2,no R/M/G  Pulmonary: CTA b/l.  No wheezes/rales/rhonchi. Abdominal: Soft,NTND, + BS. Skin: No rash, nodules, or periungual changes. Neuro: Alert; able to carry normal conversation    Musculoskeletal:   Right shoulder with mild swelling. Very minimal ROM actively and passively  Right hand with some ulnar deviation and all digits are in flexion contractures at the MCP which are only partially reducible. Hypertrophy of right MCPs and right wrist appears to have fused with no active swelling  Left wrist also with fusion with no active swelling. Left MCPs with hypertrophy as well  Mild swelling in b/l ankles  TTP of MTP joints  TTP of all joints    DATA REVIEW    Prior medical records were reviewed and if applicable are summarized as below:    Labs:   10/2019: Quant gold, HBV, HCV negative, RF, CCP positive, ESR 61, CRP, CMP normal  7/2019: cbc, CMP, TSH normal    Imaging:   Hand x-rays (10/2019): Personally reviewed images. + severe erosive changes  Foot x-rays (10/2019): Personally reviewed images. + erosive and DJD changes    ASSESSMENT AND PLAN    A 60 y.o. female with hx of CVA with right side hemiparesis, DM presents for a follow up visit. The patient has seropositive erosive rheumatoid arthritis. This is likely the etiology of her symptoms. It appears that a lot of her disease might be burnt out but she still has very active disease. # Seropositive erosive rheumatoid arthritis:  - will plan on initiating patient on methotrexate today. Start and 15 mg oral weekly with daily folic acid. I explained the risks and benefits of methotrexate and answered all the questions in detail. I advised her to avoid alcohol. It is a weekly regimen that is supplemented with daily folic acid to help counteract potential side effects.  I discussed with the patient the potential adverse effects, which include: nausea, vomiting, dyspepsia, oral ulcers, hair thinning, infection, liver function abnormalities, blood count abnormalities, and rarely pneumonitis or melanoma. The patient understood these possible adverse effects. I informed the patient of the need for routine CBC and CMP as a measure for long term use of immunosuppressants. I also instructed the patient to avoid ill contacts and the needs for annual influenza vaccines and the pneumonia vaccines. I asked the patient to apply SPF 50 sunscreen when out in the sun.   - daily folic acid  - will start patient on prednisone 20 mg oral daily given active disease along with daily calcium and vitamin D.      # Adhesive capsulitis of right shoulder:  - due to right hemiparesis most likely    # Diabetes:  - defer to PCP    # Medication Toxicity Monitoring:  - cbc, cmp reviewed  - Hepatitis B, C: negative 10/2019  - Quant gold: negative 10/2019  - Immunizations: We discussed receiving influenza, Prevar-13, and Pneumovax-23 vaccines as per the CDC guidelines for immunosuppressed patients. - bone health: caltrate prescribed  - I counseled the patient on the risk of avascular necrosis from corticosteroids. For each 20 mg of prednisone taken for over a month, the risk of AVN is 5%. It can also lead to weight gain, mood imbalances, osteoporosis, acne etc.     RTC in 4 weeks    The patient voiced understanding of the aforementioned assessment and plan. Summary of plan was provided in the After Visit Summary patient instructions. I also provided education about MyChart setup and utility. Ms. Lyly Lux has a reminder for a \"due or due soon\" health maintenance. I have asked that she contact her primary care provider for follow-up on this health maintenance. TODAY'S ORDERS    Orders Placed This Encounter    predniSONE (DELTASONE) 20 mg tablet    methotrexate (RHEUMATREX) 2.5 mg tablet    folic acid (FOLVITE) 1 mg tablet    calcium carbonate-vitamin D3 (CALTRATE 600 PLUS D) 600 mg (1,500 mg)-800 unit chew       Future Appointments   Date Time Provider Kaur Herzog   11/29/2019  9:45 AM [facility] [address] 12/2/2019 11:15 AM Piper Ward MD [address]   12/17/2019  2:00 PM Tadeo Brooks  [address], MD    Adult Rheumatology   [facility]  A Part of [facility], [address] [place] [address] Road   Phone [phone]  Fax [phone]

## 2019-11-19 NOTE — PROGRESS NOTES
Chief Complaint   Patient presents with    Arthritis     shoulders, hands, feet     1. Have you been to the ER, urgent care clinic since your last visit? Hospitalized since your last visit? No    2. Have you seen or consulted any other health care providers outside of the [address] [place] [address] since your last visit? Include any pap smears or colon screening.  No

## 2019-12-13 ENCOUNTER — OFFICE VISIT (OUTPATIENT)
Dept: FAMILY MEDICINE CLINIC | Age: 64
End: 2019-12-13

## 2019-12-13 VITALS
RESPIRATION RATE: 18 BRPM | HEART RATE: 73 BPM | OXYGEN SATURATION: 96 % | SYSTOLIC BLOOD PRESSURE: 120 MMHG | DIASTOLIC BLOOD PRESSURE: 72 MMHG | WEIGHT: 111.1 LBS | TEMPERATURE: 97.3 F | BODY MASS INDEX: 21.81 KG/M2 | HEIGHT: 60 IN

## 2019-12-13 DIAGNOSIS — E78.00 HYPERCHOLESTEREMIA: ICD-10-CM

## 2019-12-13 DIAGNOSIS — G89.29 CHRONIC RIGHT SHOULDER PAIN: ICD-10-CM

## 2019-12-13 DIAGNOSIS — E11.9 DIABETES MELLITUS TYPE 2, CONTROLLED (HCC): ICD-10-CM

## 2019-12-13 DIAGNOSIS — Z86.73 S/P STROKE DUE TO CEREBROVASCULAR DISEASE: Primary | ICD-10-CM

## 2019-12-13 DIAGNOSIS — M79.641 PAIN IN BOTH HANDS: ICD-10-CM

## 2019-12-13 DIAGNOSIS — I69.354 HEMIPARESIS AFFECTING LEFT SIDE AS LATE EFFECT OF CEREBROVASCULAR ACCIDENT (HCC): ICD-10-CM

## 2019-12-13 DIAGNOSIS — M79.642 PAIN IN BOTH HANDS: ICD-10-CM

## 2019-12-13 DIAGNOSIS — M25.511 CHRONIC RIGHT SHOULDER PAIN: ICD-10-CM

## 2019-12-13 RX ORDER — METFORMIN HYDROCHLORIDE 500 MG/1
500 TABLET ORAL
Qty: 90 TAB | Refills: 3
Start: 2019-12-13 | End: 2020-04-27 | Stop reason: SDUPTHER

## 2019-12-13 RX ORDER — GABAPENTIN 100 MG/1
100 CAPSULE ORAL
Qty: 30 CAP | Refills: 2
Start: 2019-12-13 | End: 2020-04-27 | Stop reason: ALTCHOICE

## 2019-12-13 RX ORDER — SIMVASTATIN 10 MG/1
TABLET, FILM COATED ORAL
Qty: 90 TAB | Refills: 1 | Status: SHIPPED | OUTPATIENT
Start: 2019-12-13 | End: 2020-04-27 | Stop reason: SDUPTHER

## 2019-12-13 NOTE — PATIENT INSTRUCTIONS
diab       Rheumatoid Arthritis: Care Instructions  Your Care Instructions    Arthritis is a common health problem in which the joints are inflamed. There are many types of arthritis. In rheumatoid arthritis, the body's own immune system attacks the joints. This causes pain, stiffness, and swelling in the joints, especially in the hands and feet. It can become hard to open jars, write, and do other daily tasks. Sometimes rheumatoid arthritis can also cause bumps to form under the skin. Over time, rheumatoid arthritis can damage and deform joints. Early treatment with medicines may reduce your chances of having a lasting disability. Follow-up care is a key part of your treatment and safety. Be sure to make and go to all appointments, and call your doctor if you are having problems. It's also a good idea to know your test results and keep a list of the medicines you take. How can you care for yourself at home? · If your doctor recommends it, get more exercise. Walking is a good choice. If your knees or ankles hurt, try riding a stationary bike or swimming. · Move each joint gently through its full range of motion once or twice a day. · Rest joints when they are sore or overworked. Short rest breaks may help more than staying in bed. · Reach and stay at a healthy weight. Regular exercise and a healthy diet will help you do this. Extra weight can strain the joints, especially the knees and hips, and make the pain worse. Losing even a few pounds may help. · Get enough calcium and vitamin D to help prevent osteoporosis, which causes thin bones. Talk to your doctor about how much you should take. · Protect your joints from injury. Do not overuse them. Try to limit or avoid activities that cause joint pain or swelling. Use special kitchen tools and other self-help devices as well as walkers, splints, or canes if needed. · Use heat to ease pain. Take warm showers or baths. Use hot packs or a heating pad set on low. Sleep under a warm electric blanket. · Put ice or a cold pack on the area for 10 to 20 minutes at a time. Put a thin cloth between the ice and your skin. · Take pain medicines exactly as directed. ? If the doctor gave you a prescription medicine for pain, take it as prescribed. ? If you are not taking a prescription pain medicine, ask your doctor if you can take an over-the-counter medicine. · Take an active role in managing your condition. Set up a treatment plan with your doctor, and learn as much as you can about rheumatoid arthritis. This will help you control pain and stay active. When should you call for help? Call your doctor now or seek immediate medical care if:    · You have a fever or a rash along with joint pain.     · You have joint pain that is so severe that you cannot use the joint at all.     · You have sudden swelling, redness, or pain in one or more joints, and you do not know why.     · You have back or neck pain along with weakness in your arms or legs.     · You have a loss of bowel or bladder control.    Watch closely for changes in your health, and be sure to contact your doctor if:    · You have joint pain that lasts for more than 6 weeks.     · You have side effects from your arthritis medicines, such as stomach pain, nausea, heartburn, or dark and tarlike stools. Where can you learn more? Go to http://nikos-patrizia.info/. Enter K205 in the search box to learn more about \"Rheumatoid Arthritis: Care Instructions. \"  Current as of: April 1, 2019  Content Version: 12.2  © 8394-6748 [employer]. Care instructions adapted under license by [employer] (which disclaims liability or warranty for this information). If you have questions about a medical condition or this instruction, always ask your healthcare professional. [address] any warranty or liability for your use of this information.          Learning About Diabetes and Exercise  Can you exercise if you have diabetes? When you have diabetes, it's important to get regular exercise. This helps control your blood sugar level. You can still play sports, run, ride a bike, go swimming, and do other activities when you have diabetes. How can exercise help you manage diabetes? Your body turns the food you eat into glucose, a type of sugar. You need this sugar for energy. When you have diabetes, the sugar builds up in your blood. But when you exercise, your body uses sugar. This helps keep it from building up in your blood and results in lower blood sugar and better control of diabetes. Exercise may help you in other ways too. It can help you reach and stay at a healthy weight. It also helps improve blood pressure and cholesterol, which can reduce the risk of heart disease. Exercise can make you feel stronger and happier. It can help you relax and sleep better, and give you confidence in other things you do. How can you exercise safely? Before you start a new exercise program, talk to your doctor about how and when to exercise. You may need to have a medical exam and tests before you begin. Some types of exercise can be harmful if your diabetes is causing other problems, such as problems with your feet. Your doctor can tell you what types of exercise are good choices for you. These tips can help you exercise safely when you have diabetes. If your diabetes is controlled by diet or medicine that doesn't lower your blood sugar, you don't need to eat a snack before you exercise. · Check your blood sugar before you exercise. And be careful about what you eat. ? If your blood sugar is less than 100, eat a carbohydrate snack before you exercise. ? Be careful when you exercise if your blood sugar is over 300. High blood sugar can make you dehydrated. And that makes your blood sugar levels go even higher.  If you have ketones in your blood or urine and your blood sugar is over 300, do not exercise. · Don't try to do too much at first. Build up your exercise program bit by bit. Try to get at least 30 minutes of exercise on most days of the week. Walking is a good choice. You also may want to do other activities, such as riding a bike or swimming. You might try running or gardening. Try to include muscle-strengthening exercises at least 2 times a week. These exercises include push-ups and weight training. You can also use rubber tubing or stretch bands. You stretch or pull the tubing or band to build muscle strength. If you want to exercise more, slowly increase how hard or long you exercise. · You may get symptoms of low blood sugar during exercise or up to 24 hours later. Some symptoms of low blood sugar, such as sweating, a fast heartbeat, or feeling tired, can be confused with what can happen anytime you exercise. Other symptoms may include feeling anxious, dizzy, weak, or shaky. So it's a good idea to check your blood sugar again. · You can treat low blood sugar by eating or drinking something that has 15 grams of carbohydrate. These should be quick-sugar foods. [place] foods such as fruit juice, regular (not diet) soda, glucose tablets, hard candy, or raisins can help raise blood sugar. Check your blood sugar level again 15 minutes after having a quick-sugar food to make sure your level is getting back to your target range. · Drink plenty of water before, during, and after you exercise. · Wear medical alert jewelry that says you have diabetes. You can buy this at most drugstores. · Pay attention to your body. If you are used to exercise and notice that you can't do as much as usual, talk to your doctor. Follow-up care is a key part of your treatment and safety. Be sure to make and go to all appointments, and call your doctor if you are having problems. It's also a good idea to know your test results and keep a list of the medicines you take. Where can you learn more?   Go to http://nikos-patrizia.info/. Enter N008 in the search box to learn more about \"Learning About Diabetes and Exercise. \"  Current as of: April 16, 2019  Content Version: 12.2  © 2965-7543 [employer]. Care instructions adapted under license by [employer] (which disclaims liability or warranty for this information). If you have questions about a medical condition or this instruction, always ask your healthcare professional. [place] [address] any warranty or liability for your use of this information.

## 2019-12-13 NOTE — PROGRESS NOTES
HISTORY OF PRESENT ILLNESS  Liana Crowder is a 60 y.o. female. HPI   Hands feet and shoulders feeling stiff was told to Have RA, sees the Rheumatologist, meds make her tired,  Currently ongabapentin (NEURONTIN) 100 mg capsule by mouth three (3) times daily, baclofen (LIORESAL) 10 mg tablet by mouth two (2) times daily as needed for Pain,  Methotrexate 15mg once wkly, prednisone 95AB daily folic acid daily,     Last a1c was 7.2 stable no home monitoring no polyuria no polydipsia, n o refill needed      Current Outpatient Medications   Medication Sig Dispense Refill    predniSONE (DELTASONE) 20 mg tablet Take 20 mg by mouth daily. 60 Tab 0    methotrexate (RHEUMATREX) 2.5 mg tablet Take 6 Tabs by mouth Every Saturday. 90 Tab 0    folic acid (FOLVITE) 1 mg tablet Take 1 Tab by mouth daily. 90 Tab 0    calcium carbonate-vitamin D3 (CALTRATE 600 PLUS D) 600 mg (1,500 mg)-800 unit chew Take 1 Tab by mouth daily. 90 Tab 3    cloNIDine HCl (CATAPRES) 0.2 mg tablet TAKE 1 TABLET BY MOUTH EVERY NIGHT 90 Tab 1    lisinopril-hydroCHLOROthiazide (PRINZIDE, ZESTORETIC) 20-25 mg per tablet Take 1 Tab by mouth daily. Patient requires an office visit for additional refills. 90 Tab 1    metFORMIN (GLUCOPHAGE) 500 mg tablet Take 0.5 Tabs by mouth two (2) times daily (with meals). 90 Tab 3    metoprolol tartrate (LOPRESSOR) 25 mg tablet TAKE 1 TABLET BY MOUTH TWICE A DAY 60 Tab 6    baclofen (LIORESAL) 10 mg tablet Take 1 Tab by mouth two (2) times daily as needed for Pain. 40 Tab 2    simvastatin (ZOCOR) 10 mg tablet TAKE 1 TABLET BY MOUTH EVERY NIGHT PATIENT REQUIRES AN OFFICE VISIT FOR ADDITIONAL REFILLS. 90 Tab 1    aspirin 81 mg chewable tablet Take 1 Tab by mouth daily. 30 Tab 11    gabapentin (NEURONTIN) 100 mg capsule Take 1 Cap by mouth three (3) times daily. Max Daily Amount: 300 mg.  Indications: Neuropathic Pain 60 Cap 2     Allergies   Allergen Reactions    Codeine Nausea and Vomiting     Past Medical History:   Diagnosis Date    Arthritis     hands/all over    Diabetes mellitus ([facility] [address] 6/15/2015    Dyspepsia and other specified disorders of function of stomach     Foot pain, bilateral 6/15/2015    Hemiparesis affecting left side as late effect of cerebrovascular accident ([facility] [address] 4/18/2014    Hypercholesteremia 4/18/2014    Hypertension     Knee pain, bilateral 6/15/2015    Lipoma of back 5/14/2015    Nausea & vomiting     Prediabetes 5/14/2015    S/P stroke due to cerebrovascular disease 4/18/2014    Screening for breast cancer 9/15/2015    Type II diabetes mellitus with nephropathy ([facility] [address] 7/23/2019     Past Surgical History:   Procedure Laterality Date    ABDOMEN SURGERY PROC UNLISTED  2004    hernia repair    COLONOSCOPY N/A 10/17/2019    COL[facility] performed [facility] ENDOSCOPY    HX OTHER SURGICAL      lipoma removed from back    HX TUBAL LIGATION  1984     Family History   Problem Relation Age of Onset    Hypertension Mother    [de-identified] Diabetes Mother     Arthritis-rheumatoid Father     Hypertension Brother     Stroke Brother      Social History     Tobacco Use    Smoking status: Former Smoker     Packs/day: 0.25     Years: 35.00     Pack years: 8.75     Last attempt to quit: 5/28/2009     Years since quitting: 10.5    Smokeless tobacco: Never Used   Substance Use Topics    Alcohol use: No     Comment: ETOH abuse in past; quit 2012      Lab Results   Component Value Date/Time    WBC 7.6 07/16/2019 03:42 PM    HGB 11.6 07/16/2019 03:42 PM    HCT 36.2 07/16/2019 03:42 PM    PLATELET 713 32/17/7508 03:42 PM    MCV 85 07/16/2019 03:42 PM     Lab Results   Component Value Date/Time    Hemoglobin A1c 7.2 (H) 11/07/2019 11:47 AM    Hemoglobin A1c 6.6 (H) 09/15/2015 10:51 AM    Hemoglobin A1c 7.0 (H) 05/14/2015 03:44 PM    Glucose 116 (H) 10/23/2019 02:16 PM    Glucose (POC) 112 (H) 06/04/2015 08:08 AM    Microalb/Creat ratio (ug/mg creat.) 96.3 (H) 07/16/2019 03:41 PM    LDL, calculated 117 (H) 07/16/2019 03:42 PM    Creatinine 0.79 10/23/2019 02:16 PM      Lab Results   Component Value Date/Time    GFR est non-AA 80 10/23/2019 02:16 PM    GFR est AA 93 10/23/2019 02:16 PM    Creatinine 0.79 10/23/2019 02:16 PM    BUN 19 10/23/2019 02:16 PM    Sodium 139 10/23/2019 02:16 PM    Potassium 4.2 10/23/2019 02:16 PM    Chloride 102 10/23/2019 02:16 PM    CO2 23 10/23/2019 02:16 PM        Review of Systems   Constitutional: Negative for chills and fever. HENT: Negative for ear pain and nosebleeds. Eyes: Negative for blurred vision, pain and discharge. Respiratory: Negative for shortness of breath. Cardiovascular: Negative for chest pain and leg swelling. Gastrointestinal: Negative for constipation, diarrhea, nausea and vomiting. Genitourinary: Negative for frequency. Musculoskeletal: Positive for back pain, joint pain, myalgias and neck pain. Skin: Negative for itching and rash. Neurological: Negative for headaches. Psychiatric/Behavioral: Negative for depression. The patient is not nervous/anxious. Physical Exam  Vitals signs and nursing note reviewed. Constitutional:       Appearance: She is well-developed. HENT:      Head: Normocephalic and atraumatic. Eyes:      Conjunctiva/sclera: Conjunctivae normal.   Neck:      Musculoskeletal: Normal range of motion and neck supple. Cardiovascular:      Rate and Rhythm: Normal rate and regular rhythm. Heart sounds: Normal heart sounds. No murmur. Pulmonary:      Effort: Pulmonary effort is normal.      Breath sounds: Normal breath sounds. Abdominal:      General: Bowel sounds are normal. There is no distension. Palpations: Abdomen is soft. Musculoskeletal:         General: Tenderness and deformity present. Skin:     Findings: No erythema. Neurological:      Mental Status: She is alert and oriented to person, place, and time.    Psychiatric:         Behavior: Behavior normal.         ASSESSMENT and PLAN  Diagnoses and all orders for this visit:    1. S/P stroke due to cerebrovascular disease  -     gabapentin (NEURONTIN) 100 mg capsule; Take 1 Cap by mouth daily as needed for Pain. Indications: Neuropathic Pain  -     simvastatin (ZOCOR) 10 mg tablet; TAKE 1 TABLET BY MOUTH EVERY NIGHT PATIENT REQUIRES AN OFFICE VISIT FOR ADDITIONAL REFILLS. 2. Diabetes mellitus type 2, controlled ([address]  -     gabapentin (NEURONTIN) 100 mg capsule; Take 1 Cap by mouth daily as needed for Pain. Indications: Neuropathic Pain  -     metFORMIN (GLUCOPHAGE) 500 mg tablet; Take 1 Tab by mouth daily (with breakfast). 3. Chronic right shoulder pain  -     gabapentin (NEURONTIN) 100 mg capsule; Take 1 Cap by mouth daily as needed for Pain. Indications: Neuropathic Pain    4. Pain in both hands  -     gabapentin (NEURONTIN) 100 mg capsule; Take 1 Cap by mouth daily as needed for Pain. Indications: Neuropathic Pain    5. Hemiparesis affecting left side as late effect of cerebrovascular accident ([facility] [address]  -     simvastatin (ZOCOR) 10 mg tablet; TAKE 1 TABLET BY MOUTH EVERY NIGHT PATIENT REQUIRES AN OFFICE VISIT FOR ADDITIONAL REFILLS. 6. Hypercholesteremia  -     simvastatin (ZOCOR) 10 mg tablet; TAKE 1 TABLET BY MOUTH EVERY NIGHT PATIENT REQUIRES AN OFFICE VISIT FOR ADDITIONAL REFILLS. -     metFORMIN (GLUCOPHAGE) 500 mg tablet; Take 1 Tab by mouth daily (with breakfast).

## 2019-12-17 ENCOUNTER — OFFICE VISIT (OUTPATIENT)
Dept: RHEUMATOLOGY | Age: 64
End: 2019-12-17

## 2019-12-17 VITALS
SYSTOLIC BLOOD PRESSURE: 126 MMHG | WEIGHT: 108 LBS | TEMPERATURE: 98.6 F | DIASTOLIC BLOOD PRESSURE: 84 MMHG | RESPIRATION RATE: 18 BRPM | BODY MASS INDEX: 21.2 KG/M2 | HEART RATE: 80 BPM | HEIGHT: 60 IN

## 2019-12-17 DIAGNOSIS — Z86.73 S/P STROKE DUE TO CEREBROVASCULAR DISEASE: ICD-10-CM

## 2019-12-17 DIAGNOSIS — M05.9 SEROPOSITIVE RHEUMATOID ARTHRITIS (HCC): ICD-10-CM

## 2019-12-17 DIAGNOSIS — E11.8 CONTROLLED DIABETES MELLITUS TYPE 2 WITH COMPLICATIONS, UNSPECIFIED WHETHER LONG TERM INSULIN USE (HCC): ICD-10-CM

## 2019-12-17 DIAGNOSIS — Z79.60 LONG-TERM USE OF IMMUNOSUPPRESSANT MEDICATION: Primary | ICD-10-CM

## 2019-12-17 RX ORDER — PREDNISONE 10 MG/1
15 TABLET ORAL
Qty: 45 TAB | Refills: 2 | Status: SHIPPED | OUTPATIENT
Start: 2019-12-17 | End: 2020-02-21 | Stop reason: ALTCHOICE

## 2019-12-17 NOTE — PROGRESS NOTES
REASON FOR VISIT    Patient presents for a follow up visit for    ICD-10-CM ICD-9-CM    1. Long-term use of immunosuppressant medication Z79.899 V58.69 CBC WITH AUTOMATED DIFF      METABOLIC PANEL, COMPREHENSIVE   2. Seropositive rheumatoid arthritis ([facility] M05.9 714.0    3. S/P stroke due to cerebrovascular disease Z86.73 V12.54    4. Controlled diabetes mellitus type 2 with complications, unspecified whether long term insulin use ([facility] E11.8 250.90        HISTORY OF DISEASE: Seropositive erosive rheumatoid arthritis    Year of diagnosis: 2019  First visit with me: 10/2019  Cumulative disease manifestations: erosions  Positive serologies/labs: RF, CCP  Negative serologies/labs: Other co-morbidities: CVA with residual right sided hemiparesis, DM with neuropathy, HTN  Relapses:      Past treatment history:  Prednisone: prednisone 20 mg oral daily  Non-biologic DMARD: Methotrexate 15 mg oral weekly (11/2019-present)  Biologic:  Other:    HISTORY OF PRESENT ILLNESS     Previous medical records reviewed and summarized: yes    MHAQ: 1.25  Pain scale: 9/10  Patient notes methotrexate is making her a bit tired. She is taking methotrexate 15 mg oral weekly with daily folic acid. She is on prednisone 20 mg oral daily and it helps some. She still has a lot of joint pain. She thinks her right shoulder is swollen. Morning stiffness all the time.      REVIEW OF SYSTEMS    Positives as per history  Negatives as follows:  Kalani Batemanmann:    Denies unexplained persistent fevers, weight change, chronic fatigue  HEAD/EYES:              Denies eye redness, blurry vision or sudden loss of vision, dry eyes, HA, temporal artery pain  ENT:                            Denies oral/nasal ulcers, recurrent sinus infections, dry mouth  RESPIRATORY:         No pleuritic pain, history of pleural effusions, hemoptysis, exertional dyspnea  CARDIOVASCULAR:             Denies chest pain, history of pericardial effusions  GASTRO: Denies heartburn, esophageal dysmotility, abdominal pain, nausea, vomiting, diarrhea, blood in the stool  HEMATOLOGIC:        No easy bruising, purpura, swollen lymph nodes  SKIN:                           Denies alopecia, ulcers, nodules, sun sensitivity, unexplained persistent rash   VASCULAR:                Denies edema, cyanosis, raynaud phenomenon  NEUROLOGIC:           Denies specific muscle weakness, paresthesias   PSYCHIATRIC:           No sleep disturbance / snoring, depression, anxiety  MSK:                           No morning stiffness >1 hour, SI joint pain, persistent joint swelling, persistent joint pain    PAST MEDICAL HISTORY    Past Medical History:   Diagnosis Date    Arthritis     hands/all over    Diabetes mellitus ([address] 6/15/2015    Dyspepsia and other specified disorders of function of stomach     Foot pain, bilateral 6/15/2015    Hemiparesis affecting left side as late effect of cerebrovascular accident ([address] 4/18/2014    Hypercholesteremia 4/18/2014    Hypertension     Knee pain, bilateral 6/15/2015    Lipoma of back 5/14/2015    Nausea & vomiting     Prediabetes 5/14/2015    S/P stroke due to cerebrovascular disease 4/18/2014    Screening for breast cancer 9/15/2015    Type II diabetes mellitus with nephropathy ([address] 7/23/2019        Past Surgical History:   Procedure Laterality Date    ABDOMEN SURGERY PROC UNLISTED  2004    hernia repair    COLONOSCOPY N/A 10/17/2019    COL[facility] performed [facility] at [facility]COPY    HX OTHER SURGICAL      lipoma removed from back    HX TUBAL LIGATION  1984       FAMILY HISTORY    Family History   Problem Relation Age of Onset    Hypertension Mother     Diabetes Mother     Arthritis-rheumatoid Father     Hypertension Brother     Stroke Brother        SOCIAL HISTORY    Social History     Tobacco Use    Smoking status: Former Smoker     Packs/day: 0.25     Years: 35.00     Pack years: 8.75     Last attempt to quit: 5/28/2009     Years since quitting: 10.5    Smokeless tobacco: Never Used   Substance Use Topics    Alcohol use: No     Comment: ETOH abuse in past; quit 2012    Drug use: No       MEDICATIONS    Current Outpatient Medications   Medication Sig Dispense Refill    upadacitinib (RINVOQ ER) 15 mg Tb24 Take 15 mg by mouth daily. 90 Tab 0    predniSONE (DELTASONE) 10 mg tablet Take 15 mg by mouth daily (with breakfast). 45 Tab 2    upadacitinib (RINVOQ ER) 15 mg Tb24 Take 15 mg by mouth daily. 28 Tab 0    gabapentin (NEURONTIN) 100 mg capsule Take 1 Cap by mouth daily as needed for Pain. Indications: Neuropathic Pain 30 Cap 2    simvastatin (ZOCOR) 10 mg tablet TAKE 1 TABLET BY MOUTH EVERY NIGHT PATIENT REQUIRES AN OFFICE VISIT FOR ADDITIONAL REFILLS. 90 Tab 1    metFORMIN (GLUCOPHAGE) 500 mg tablet Take 1 Tab by mouth daily (with breakfast). 90 Tab 3    methotrexate (RHEUMATREX) 2.5 mg tablet Take 6 Tabs by mouth Every Saturday. 90 Tab 0    folic acid (FOLVITE) 1 mg tablet Take 1 Tab by mouth daily. 90 Tab 0    calcium carbonate-vitamin D3 (CALTRATE 600 PLUS D) 600 mg (1,500 mg)-800 unit chew Take 1 Tab by mouth daily. 90 Tab 3    cloNIDine HCl (CATAPRES) 0.2 mg tablet TAKE 1 TABLET BY MOUTH EVERY NIGHT 90 Tab 1    lisinopril-hydroCHLOROthiazide (PRINZIDE, ZESTORETIC) 20-25 mg per tablet Take 1 Tab by mouth daily. Patient requires an office visit for additional refills. 90 Tab 1    metoprolol tartrate (LOPRESSOR) 25 mg tablet TAKE 1 TABLET BY MOUTH TWICE A DAY 60 Tab 6    aspirin 81 mg chewable tablet Take 1 Tab by mouth daily. 30 Tab 11       ALLERGIES    Allergies   Allergen Reactions    Codeine Nausea and Vomiting       PHYSICAL EXAMINATION    Visit Vitals  /84   Pulse 80   Temp 98.6 °F (37 °C)   Resp 18   Ht 5' (1.524 m)   Wt 108 lb (49 kg)   BMI 21.09 kg/m²     Body mass index is 21.09 kg/m².     General: NAD  HEENT: PERRL, anicteric, non-injected sclerae; oropharynx without ulcers, erythema, or exudate. Moist mucous membranes. Lymphatic: No cervical or axillary lymphadenopathy. Cardiovascular: S1, S2,no R/M/G  Pulmonary: CTA b/l. No wheezes/rales/rhonchi. Abdominal: Soft,NTND, + BS. Skin: No rash, nodules, or periungual changes. Neuro: Alert; able to carry normal conversation    Musculoskeletal:   Right hand with some ulnar deviation and all digits are in flexion contractures at the MCP which are only partially reducible. Hypertrophy of right MCPs and right wrist appears to have fused with no active swelling  Left wrist also with fusion with no active swelling. Left MCPs with hypertrophy as well  Mild swelling in b/l ankles    Joint Count 12/17/2019 11/19/2019   Patient pain (0-100) 50 75   MHAQ 1.375 1.75   Left wrist- Tender 1 -   Right shoulder - Tender 1 -   Right shoulder - Swollen 1 -   Right 3rd MCP - Tender 1 -   Right 3rd MCP - Swollen 1 -   Tender Joint Count (Total) 3 -   Swollen Joint Count (Total) 2 -   Physician Assessment (0-10) 9 -   Patient Assessment (0-10) 9 10   CDAI Total (calculated) 23 -       DATA REVIEW    Prior medical records were reviewed and if applicable are summarized as below:    Labs:   10/2019: Quant gold, HBV, HCV negative, RF, CCP positive, ESR 61, CRP, CMP normal  7/2019: cbc, CMP, TSH normal    Imaging:   Hand x-rays (10/2019): Personally reviewed images. + severe erosive changes  Foot x-rays (10/2019): Personally reviewed images. + erosive and DJD changes    ASSESSMENT AND PLAN    A 60 y.o. female with hx of CVA with right side hemiparesis, DM, seropositive erosive rheumatoid arthritis on methotrexate 15 mg oral weekly with daily folic acid and prednisone 20 mg oral daily presents for a follow up visit. The patient still has active disease and needs escalation in therapy. # Seropositive erosive rheumatoid arthritis:  - continue methotrexate 15 mg oral weekly  - daily folic acid  - decrease prednisone to 15 mg oral daily.    - will start her on Rinvoq 15 mg oral daily. She is unable to do injections as she has hemiparesis from the stroke. Given this, she needs oral medication. I explained the risks and benefits of Rinvoq and answered all the questions in detail. - samples provided    # Adhesive capsulitis of right shoulder:  - due to right hemiparesis most likely    # Diabetes:  - defer to PCP    # Medication Toxicity Monitoring:  - cbc, cmp ordered today  - Hepatitis B, C: negative 10/2019  - Quant gold: negative 10/2019  - Immunizations: We discussed receiving influenza, Prevar-13, and Pneumovax-23 vaccines as per the CDC guidelines for immunosuppressed patients. - bone health: caltrate prescribed  - I counseled the patient on the risk of avascular necrosis from corticosteroids. For each 20 mg of prednisone taken for over a month, the risk of AVN is 5%. It can also lead to weight gain, mood imbalances, osteoporosis, acne etc.     RTC in 4 weeks    The patient voiced understanding of the aforementioned assessment and plan. Summary of plan was provided in the After Visit Summary patient instructions. I also provided education about MyChart setup and utility. Ms. Azra Guillen has a reminder for a \"due or due soon\" health maintenance. I have asked that she contact her primary care provider for follow-up on this health maintenance.     TODAY'S ORDERS    Orders Placed This Encounter    CBC WITH AUTOMATED DIFF    METABOLIC PANEL, COMPREHENSIVE    upadacitinib [facility]) 15 mg Tb24    predniSONE (DELTASONE) 10 mg tablet    upadacitinib [facility]) 15 mg Tb24       Future Appointments   Date Time Provider Kaur Elmorei   1/17/2020  2:20 PM Braulio Sherman MD [facility]   3/13/2020  8:30 AM Justin Diaz MD [address] [place], [place]    Adult Rheumatology   [facility]  A Part of [facility], [address]   Phone [phone]  Fax [phone]

## 2019-12-17 NOTE — PROGRESS NOTES
Chief Complaint   Patient presents with    Arthritis     1. Have you been to the ER, urgent care clinic since your last visit? Hospitalized since your last visit? No    2. Have you seen or consulted any other health care providers outside of the [address] [place] [address] since your last visit? Include any pap smears or colon screening.  No

## 2019-12-17 NOTE — PATIENT INSTRUCTIONS
Please fill out your [address] you will receive after your visit in the mail or via [phone]!

## 2019-12-18 LAB
ALBUMIN SERPL-MCNC: 4.3 G/DL (ref 3.6–4.8)
ALBUMIN/GLOB SERPL: 1.4 {RATIO} (ref 1.2–2.2)
ALP SERPL-CCNC: 98 IU/L (ref 39–117)
ALT SERPL-CCNC: 10 IU/L (ref 0–32)
AST SERPL-CCNC: 11 IU/L (ref 0–40)
BASOPHILS # BLD AUTO: 0.1 X10E3/UL (ref 0–0.2)
BASOPHILS NFR BLD AUTO: 1 %
BILIRUB SERPL-MCNC: 0.7 MG/DL (ref 0–1.2)
BUN SERPL-MCNC: 18 MG/DL (ref 8–27)
BUN/CREAT SERPL: 24 (ref 12–28)
CALCIUM SERPL-MCNC: 9.7 MG/DL (ref 8.7–10.3)
CHLORIDE SERPL-SCNC: 102 MMOL/L (ref 96–106)
CO2 SERPL-SCNC: 22 MMOL/L (ref 20–29)
CREAT SERPL-MCNC: 0.74 MG/DL (ref 0.57–1)
EOSINOPHIL # BLD AUTO: 0.1 X10E3/UL (ref 0–0.4)
EOSINOPHIL NFR BLD AUTO: 1 %
ERYTHROCYTE [DISTWIDTH] IN BLOOD BY AUTOMATED COUNT: 14.9 % (ref 12.3–15.4)
GLOBULIN SER CALC-MCNC: 3 G/DL (ref 1.5–4.5)
GLUCOSE SERPL-MCNC: 80 MG/DL (ref 65–99)
HCT VFR BLD AUTO: 36 % (ref 34–46.6)
HGB BLD-MCNC: 12 G/DL (ref 11.1–15.9)
IMM GRANULOCYTES # BLD AUTO: 0 X10E3/UL (ref 0–0.1)
IMM GRANULOCYTES NFR BLD AUTO: 0 %
LYMPHOCYTES # BLD AUTO: 1.9 X10E3/UL (ref 0.7–3.1)
LYMPHOCYTES NFR BLD AUTO: 27 %
MCH RBC QN AUTO: 30 PG (ref 26.6–33)
MCHC RBC AUTO-ENTMCNC: 33.3 G/DL (ref 31.5–35.7)
MCV RBC AUTO: 90 FL (ref 79–97)
MONOCYTES # BLD AUTO: 0.5 X10E3/UL (ref 0.1–0.9)
MONOCYTES NFR BLD AUTO: 7 %
NEUTROPHILS # BLD AUTO: 4.6 X10E3/UL (ref 1.4–7)
NEUTROPHILS NFR BLD AUTO: 64 %
PLATELET # BLD AUTO: 389 X10E3/UL (ref 150–450)
POTASSIUM SERPL-SCNC: 4.6 MMOL/L (ref 3.5–5.2)
PROT SERPL-MCNC: 7.3 G/DL (ref 6–8.5)
RBC # BLD AUTO: 4 X10E6/UL (ref 3.77–5.28)
SODIUM SERPL-SCNC: 140 MMOL/L (ref 134–144)
WBC # BLD AUTO: 7.2 X10E3/UL (ref 3.4–10.8)

## 2019-12-26 ENCOUNTER — DOCUMENTATION ONLY (OUTPATIENT)
Dept: PHARMACY | Age: 64
End: 2019-12-26

## 2019-12-26 NOTE — PROGRESS NOTES
[facility] at [address] [facility] Update    Date: 12/26/19    Kenisha Cortez 1955    Medication: Rinvoq 15mg tablet    Prescription transferred to [address]    Phone: [phone]    Pharmacist counseled the patient and informed patient their prescription was transferred to the mandated specialty pharmacy and gave patient the specialty pharmacy's phone number. Pharmacist answered any questions that the patient had.     Kelli Posadas, [address] at [facility],  [place], [address]  phone: [phone]   fax: [phone]

## 2020-01-08 ENCOUNTER — DOCUMENTATION ONLY (OUTPATIENT)
Dept: RHEUMATOLOGY | Age: 65
End: 2020-01-08

## 2020-01-22 ENCOUNTER — OFFICE VISIT (OUTPATIENT)
Dept: RHEUMATOLOGY | Age: 65
End: 2020-01-22

## 2020-01-22 VITALS
OXYGEN SATURATION: 98 % | RESPIRATION RATE: 16 BRPM | BODY MASS INDEX: 21.4 KG/M2 | WEIGHT: 109 LBS | SYSTOLIC BLOOD PRESSURE: 103 MMHG | DIASTOLIC BLOOD PRESSURE: 69 MMHG | HEIGHT: 60 IN | HEART RATE: 82 BPM | TEMPERATURE: 98.4 F

## 2020-01-22 DIAGNOSIS — E11.8 CONTROLLED DIABETES MELLITUS TYPE 2 WITH COMPLICATIONS, UNSPECIFIED WHETHER LONG TERM INSULIN USE (HCC): ICD-10-CM

## 2020-01-22 DIAGNOSIS — Z86.73 S/P STROKE DUE TO CEREBROVASCULAR DISEASE: ICD-10-CM

## 2020-01-22 DIAGNOSIS — Z79.60 LONG-TERM USE OF IMMUNOSUPPRESSANT MEDICATION: Primary | ICD-10-CM

## 2020-01-22 DIAGNOSIS — M05.9 SEROPOSITIVE RHEUMATOID ARTHRITIS (HCC): ICD-10-CM

## 2020-01-22 DIAGNOSIS — M19.041 PRIMARY OSTEOARTHRITIS OF BOTH HANDS: ICD-10-CM

## 2020-01-22 DIAGNOSIS — M19.042 PRIMARY OSTEOARTHRITIS OF BOTH HANDS: ICD-10-CM

## 2020-01-22 NOTE — PATIENT INSTRUCTIONS
Please fill out your [address] you will receive after your visit in the mail or via [phone] Take methotrexate 6 pills weekly with daily folic acid. Take Rinvoq once daily.      Decrease prednisone to 1/2 pill daily

## 2020-01-22 NOTE — PROGRESS NOTES
Chief Complaint   Patient presents with    Arthritis     feet, hand     1. Have you been to the ER, urgent care clinic since your last visit? Hospitalized since your last visit? No    2. Have you seen or consulted any other health care providers outside of the [address] [place] [address] since your last visit? Include any pap smears or colon screening.  No

## 2020-01-22 NOTE — PROGRESS NOTES
REASON FOR VISIT    Patient presents for a follow up visit for    ICD-10-CM ICD-9-CM    1. Long-term use of immunosuppressant medication Z79.899 V58.69    2. Seropositive rheumatoid arthritis ([facility] M05.9 714.0    3. S/P stroke due to cerebrovascular disease Z86.73 V12.54    4. Controlled diabetes mellitus type 2 with complications, unspecified whether long term insulin use ([facility]) E11.8 250.90    5. Primary osteoarthritis of both hands M19.041 715.14     M19.042         HISTORY OF DISEASE: Seropositive erosive rheumatoid arthritis    Year of diagnosis: 2019  First visit with me: 10/2019  Cumulative disease manifestations: erosions  Positive serologies/labs: RF, CCP  Negative serologies/labs: Other co-morbidities: CVA with residual right sided hemiparesis, DM with neuropathy, HTN  Relapses:      Past treatment history:  Prednisone: prednisone 10 mg oral daily  Non-biologic DMARD: Methotrexate 15 mg oral weekly (11/2019-present)  Biologic: Rinvoq 12/2019-present  Other:    HISTORY OF PRESENT ILLNESS     Previous medical records reviewed and summarized: yes    The patient notes her joints are doing better. She has not taken the methotrexate for a few weeks as she forgot to take them. She has been taking rinvoq. She thinks that has helped. She is taking 1 of prednisone 10. Some pain in hands and feet. Nothing feels swollen. Morning stiffness for about a few minutes.       REVIEW OF SYSTEMS    Positives as per history  Negatives as follows:  Alexsander Leak:    Denies unexplained persistent fevers, weight change, chronic fatigue  HEAD/EYES:              Denies eye redness, blurry vision or sudden loss of vision, dry eyes, HA, temporal artery pain  ENT:                            Denies oral/nasal ulcers, recurrent sinus infections, dry mouth  RESPIRATORY:         No pleuritic pain, history of pleural effusions, hemoptysis, exertional dyspnea  CARDIOVASCULAR:             Denies chest pain, history of pericardial effusions  GASTRO:                    Denies heartburn, esophageal dysmotility, abdominal pain, nausea, vomiting, diarrhea, blood in the stool  HEMATOLOGIC:        No easy bruising, purpura, swollen lymph nodes  SKIN:                           Denies alopecia, ulcers, nodules, sun sensitivity, unexplained persistent rash   VASCULAR:                Denies edema, cyanosis, raynaud phenomenon  NEUROLOGIC:           Denies specific muscle weakness, paresthesias   PSYCHIATRIC:           No sleep disturbance / snoring, depression, anxiety  MSK:                           No morning stiffness >1 hour, SI joint pain, persistent joint swelling, persistent joint pain    PAST MEDICAL HISTORY    Past Medical History:   Diagnosis Date    Arthritis     hands/all over    Diabetes mellitus ([facility] 6/15/2015    Dyspepsia and other specified disorders of function of stomach     Foot pain, bilateral 6/15/2015    Hemiparesis affecting left side as late effect of cerebrovascular accident ([facility] 4/18/2014    Hypercholesteremia 4/18/2014    Hypertension     Knee pain, bilateral 6/15/2015    Lipoma of back 5/14/2015    Nausea & vomiting     Prediabetes 5/14/2015    S/P stroke due to cerebrovascular disease 4/18/2014    Screening for breast cancer 9/15/2015    Type II diabetes mellitus with nephropathy ([facility] 7/23/2019        Past Surgical History:   Procedure Laterality Date    ABDOMEN SURGERY PROC UNLISTED  2004    hernia repair    COLONOSCOPY N/A 10/17/2019    COLON[facility] performed by [facility]andes [facility] at [facility] ENDOSCOPY    HX OTHER SURGICAL      lipoma removed from back    HX TUBAL LIGATION  1984       FAMILY HISTORY    Family History   Problem Relation Age of Onset    Hypertension Mother     Diabetes Mother    Federica Fischer Arthritis-rheumatoid Father     Hypertension Brother     Stroke Brother        SOCIAL HISTORY    Social History     Tobacco Use    Smoking status: Former Smoker     Packs/day: 0.25     Years: 35.00     Pack years: 8.75     Last attempt to quit: 5/28/2009     Years since quitting: 10.6    Smokeless tobacco: Never Used   Substance Use Topics    Alcohol use: No     Comment: ETOH abuse in past; quit 2012    Drug use: No       MEDICATIONS    Current Outpatient Medications   Medication Sig Dispense Refill    upadacitinib (RINVOQ ER) 15 mg Tb24 Take 15 mg by mouth daily. 90 Tab 0    predniSONE (DELTASONE) 10 mg tablet Take 15 mg by mouth daily (with breakfast). (Patient taking differently: Take 10 mg by mouth daily.) 45 Tab 2    simvastatin (ZOCOR) 10 mg tablet TAKE 1 TABLET BY MOUTH EVERY NIGHT PATIENT REQUIRES AN OFFICE VISIT FOR ADDITIONAL REFILLS. 90 Tab 1    metFORMIN (GLUCOPHAGE) 500 mg tablet Take 1 Tab by mouth daily (with breakfast). 90 Tab 3    methotrexate (RHEUMATREX) 2.5 mg tablet Take 6 Tabs by mouth Every Saturday. 90 Tab 0    folic acid (FOLVITE) 1 mg tablet Take 1 Tab by mouth daily. 90 Tab 0    calcium carbonate-vitamin D3 (CALTRATE 600 PLUS D) 600 mg (1,500 mg)-800 unit chew Take 1 Tab by mouth daily. 90 Tab 3    cloNIDine HCl (CATAPRES) 0.2 mg tablet TAKE 1 TABLET BY MOUTH EVERY NIGHT 90 Tab 1    lisinopril-hydroCHLOROthiazide (PRINZIDE, ZESTORETIC) 20-25 mg per tablet Take 1 Tab by mouth daily. Patient requires an office visit for additional refills. 90 Tab 1    metoprolol tartrate (LOPRESSOR) 25 mg tablet TAKE 1 TABLET BY MOUTH TWICE A DAY 60 Tab 6    aspirin 81 mg chewable tablet Take 1 Tab by mouth daily. 30 Tab 11    upadacitinib (RINVOQ ER) 15 mg Tb24 Take 15 mg by mouth daily. 28 Tab 0    gabapentin (NEURONTIN) 100 mg capsule Take 1 Cap by mouth daily as needed for Pain.  Indications: Neuropathic Pain 30 Cap 2       ALLERGIES    Allergies   Allergen Reactions    Codeine Nausea and Vomiting       PHYSICAL EXAMINATION    Visit Vitals  /69 (BP 1 Location: Right arm, BP Patient Position: Sitting)   Pulse 82   Temp 98.4 °F (36.9 °C) (Oral)   Resp 16   Ht 5' (1.524 m)   Wt 109 lb (49.4 kg)   SpO2 98%   BMI 21.29 kg/m²     Body mass index is 21.29 kg/m². General: NAD  HEENT: PERRL, anicteric, non-injected sclerae; oropharynx without ulcers, erythema, or exudate. Moist mucous membranes. Lymphatic: No cervical or axillary lymphadenopathy. Cardiovascular: S1, S2,no R/M/G  Pulmonary: CTA b/l. No wheezes/rales/rhonchi. Abdominal: Soft,NTND, + BS. Skin: No rash, nodules, or periungual changes. Neuro: Alert; able to carry normal conversation    Musculoskeletal:   Right hand with some ulnar deviation and all digits are in flexion contractures at the MCP which are only partially reducible. Hypertrophy of right MCPs and right wrist appears to have fused with no active swelling  Left wrist also with fusion with no active swelling. Joint Count 1/22/2020 12/17/2019 11/19/2019   Patient pain (0-100) 65 50 75   MHAQ 1.125 1.375 1.75   Left wrist- Tender 0 1 -   Left wrist- Swollen 0 - -   Right shoulder - Tender - 1 -   Right shoulder - Swollen - 1 -   Right 3rd MCP - Tender - 1 -   Right 3rd MCP - Swollen - 1 -   Tender Joint Count (Total) 0 3 -   Swollen Joint Count (Total) 0 2 -   Physician Assessment (0-10) 4 9 -   Patient Assessment (0-10) 9 9 10   CDAI Total (calculated) 13 23 -       DATA REVIEW    Prior medical records were reviewed and if applicable are summarized as below:    Labs:   12/2019: Cbc, cmp unremarkable  10/2019: Quant gold, HBV, HCV negative, RF, CCP positive, ESR 61, CRP, CMP normal  7/2019: cbc, CMP, TSH normal    Imaging:   Hand x-rays (10/2019): Personally reviewed images. + severe erosive changes  Foot x-rays (10/2019): Personally reviewed images. + erosive and DJD changes    ASSESSMENT AND PLAN    A 60 y.o. female with hx of CVA with right side hemiparesis, DM, seropositive erosive rheumatoid arthritis on methotrexate 15 mg oral weekly with daily folic acid, Rinvoq 15 mg oral daily and prednisone 10 mg oral daily presents for a follow up visit.  The patient has done well with this regimen and has no active swelling today. # Seropositive erosive rheumatoid arthritis:  - continue methotrexate 15 mg oral weekly. Counseled her to be more complaint with this  - daily folic acid  - decrease prednisone to 5 mg oral daily. - continue Rinvoq 15 mg oral daily    # Adhesive capsulitis of right shoulder:  - due to right hemiparesis most likely    # Diabetes:  - defer to PCP    # Medication Toxicity Monitoring:  - cbc, cmp at next visit  - Hepatitis B, C: negative 10/2019  - Quant gold: negative 10/2019  - Immunizations: We discussed receiving influenza, Prevar-13, and Pneumovax-23 vaccines as per the CDC guidelines for immunosuppressed patients. - bone health: caltrate prescribed  - I counseled the patient on the risk of avascular necrosis from corticosteroids. For each 20 mg of prednisone taken for over a month, the risk of AVN is 5%. It can also lead to weight gain, mood imbalances, osteoporosis, acne etc.     RTC in 4 weeks    The patient voiced understanding of the aforementioned assessment and plan. Summary of plan was provided in the After Visit Summary patient instructions. I also provided education about MyChart setup and utility. Ms. Vanessa Hinkle has a reminder for a \"due or due soon\" health maintenance. I have asked that she contact her primary care provider for follow-up on this health maintenance. TODAY'S ORDERS    No orders of the defined types were placed in this encounter.       Future Appointments   Date Time Provider Kaur Herzog   2/19/2020  3:40 PM Saeid Shankar MD [address] [facility] [place]   3/13/2020  8:30 AM Jf Sanchez MD [address] [place], [place]    Adult Rheumatology   [facility]  A Part of [address], [address]   Phone [phone]  Fax [phone]

## 2020-02-21 ENCOUNTER — OFFICE VISIT (OUTPATIENT)
Dept: RHEUMATOLOGY | Age: 65
End: 2020-02-21

## 2020-02-21 VITALS
HEART RATE: 70 BPM | OXYGEN SATURATION: 98 % | DIASTOLIC BLOOD PRESSURE: 70 MMHG | TEMPERATURE: 98.5 F | WEIGHT: 111 LBS | RESPIRATION RATE: 18 BRPM | BODY MASS INDEX: 21.79 KG/M2 | SYSTOLIC BLOOD PRESSURE: 106 MMHG | HEIGHT: 60 IN

## 2020-02-21 DIAGNOSIS — Z86.73 S/P STROKE DUE TO CEREBROVASCULAR DISEASE: ICD-10-CM

## 2020-02-21 DIAGNOSIS — E11.8 CONTROLLED DIABETES MELLITUS TYPE 2 WITH COMPLICATIONS, UNSPECIFIED WHETHER LONG TERM INSULIN USE (HCC): ICD-10-CM

## 2020-02-21 DIAGNOSIS — M75.01 ADHESIVE CAPSULITIS OF RIGHT SHOULDER: ICD-10-CM

## 2020-02-21 DIAGNOSIS — Z79.60 LONG-TERM USE OF IMMUNOSUPPRESSANT MEDICATION: Primary | ICD-10-CM

## 2020-02-21 DIAGNOSIS — M05.9 SEROPOSITIVE RHEUMATOID ARTHRITIS (HCC): ICD-10-CM

## 2020-02-21 RX ORDER — FOLIC ACID 1 MG/1
1 TABLET ORAL DAILY
Qty: 90 TAB | Refills: 5 | Status: SHIPPED | OUTPATIENT
Start: 2020-02-21 | End: 2020-05-21 | Stop reason: SDUPTHER

## 2020-02-21 RX ORDER — METHOTREXATE 2.5 MG/1
20 TABLET ORAL
Qty: 90 TAB | Refills: 1 | Status: SHIPPED | OUTPATIENT
Start: 2020-02-22 | End: 2020-05-21 | Stop reason: SDUPTHER

## 2020-02-21 NOTE — PROGRESS NOTES
Chief Complaint   Patient presents with    Arthritis    Joint Pain     hands, shoulder, feet     1. Have you been to the ER, urgent care clinic since your last visit? Hospitalized since your last visit? No    2. Have you seen or consulted any other health care providers outside of the [address] [place] [address] since your last visit? Include any pap smears or colon screening.  No

## 2020-02-21 NOTE — PATIENT INSTRUCTIONS
Please fill out your [address] you will receive after your visit in the mail or via [phone]! Please stop taking the prednisone.      Increase methotrexate to 8 pills every week with daily folic acid    Continue taking 1 pill of rinvoq daily

## 2020-02-21 NOTE — PROGRESS NOTES
REASON FOR VISIT    Patient presents for a follow up visit for    ICD-10-CM ICD-9-CM    1. Long-term use of immunosuppressant medication Z79.899 V58.69 CBC WITH AUTOMATED DIFF      METABOLIC PANEL, COMPREHENSIVE      LIPID PANEL   2. Seropositive rheumatoid arthritis ([facility] M05.9 714.0    3. S/P stroke due to cerebrovascular disease Z86.73 V12.54    4. Adhesive capsulitis of right shoulder M75.01 726.0    5. Controlled diabetes mellitus type 2 with complications, unspecified whether long term insulin use ([facility] E11.8 250.90        HISTORY OF DISEASE: Seropositive erosive rheumatoid arthritis    Year of diagnosis: 2019  First visit with me: 10/2019  Cumulative disease manifestations: erosions  Positive serologies/labs: RF, CCP  Negative serologies/labs: Other co-morbidities: CVA with residual right sided hemiparesis, DM with neuropathy, HTN  Relapses:      Past treatment history:  Prednisone: prednisone 5 mg oral daily  Non-biologic DMARD: Methotrexate 15 mg oral weekly (11/2019-present)  Biologic: Rinvoq 12/2019-present  Other:    HISTORY OF PRESENT ILLNESS     Previous medical records reviewed and summarized: yes    The joints are doing okay. Her hands and feet still hurt but not as much. No swelling. She thinks she is 50% better since starting medications. She has morning stiffness for about 5 minutes.      REVIEW OF SYSTEMS    Positives as per history  Negatives as follows:  Edna Matthewini:    Denies unexplained persistent fevers, weight change, chronic fatigue  HEAD/EYES:              Denies eye redness, blurry vision or sudden loss of vision, dry eyes, HA, temporal artery pain  ENT:                            Denies oral/nasal ulcers, recurrent sinus infections, dry mouth  RESPIRATORY:         No pleuritic pain, history of pleural effusions, hemoptysis, exertional dyspnea  CARDIOVASCULAR:             Denies chest pain, history of pericardial effusions  GASTRO:                    Denies heartburn, esophageal dysmotility, abdominal pain, nausea, vomiting, diarrhea, blood in the stool  HEMATOLOGIC:        No easy bruising, purpura, swollen lymph nodes  SKIN:                           Denies alopecia, ulcers, nodules, sun sensitivity, unexplained persistent rash   VASCULAR:                Denies edema, cyanosis, raynaud phenomenon  NEUROLOGIC:           Denies specific muscle weakness, paresthesias   PSYCHIATRIC:           No sleep disturbance / snoring, depression, anxiety  MSK:                           No morning stiffness >1 hour, SI joint pain, persistent joint swelling, persistent joint pain    PAST MEDICAL HISTORY    Past Medical History:   Diagnosis Date    Arthritis     hands/all over    Diabetes mellitus ([address] 6/15/2015    Dyspepsia and other specified disorders of function of stomach     Foot pain, bilateral 6/15/2015    Hemiparesis affecting left side as late effect of cerebrovascular accident ([address] 4/18/2014    Hypercholesteremia 4/18/2014    Hypertension     Knee pain, bilateral 6/15/2015    Lipoma of back 5/14/2015    Nausea & vomiting     Prediabetes 5/14/2015    S/P stroke due to cerebrovascular disease 4/18/2014    Screening for breast cancer 9/15/2015    Type II diabetes mellitus with nephropathy ([address] 7/23/2019        Past Surgical History:   Procedure Laterality Date    ABDOMEN SURGERY PROC UNLISTED  2004    hernia repair    COLONOSCOPY N/A 10/17/2019    COLONOS[facility]ik [facility]astume [facility] at [facility]COPY    HX OTHER SURGICAL      lipoma removed from back    HX TUBAL LIGATION  1984       FAMILY HISTORY    Family History   Problem Relation Age of Onset    Hypertension Mother     Diabetes Mother     Arthritis-rheumatoid Father     Hypertension Brother     Stroke Brother        SOCIAL HISTORY    Social History     Tobacco Use    Smoking status: Former Smoker     Packs/day: 0.25     Years: 35.00     Pack years: 8.75     Last attempt to quit: 5/28/2009 Years since quitting: 10.7    Smokeless tobacco: Never Used   Substance Use Topics    Alcohol use: No     Comment: ETOH abuse in past; quit 2012    Drug use: No       MEDICATIONS    Current Outpatient Medications   Medication Sig Dispense Refill    [START ON 2/22/2020] methotrexate (RHEUMATREX) 2.5 mg tablet Take 8 Tabs by mouth Every Saturday. 90 Tab 1    folic acid (FOLVITE) 1 mg tablet Take 1 Tab by mouth daily. 90 Tab 5    upadacitinib (RINVOQ ER) 15 mg Tb24 Take 15 mg by mouth daily. 90 Tab 0    simvastatin (ZOCOR) 10 mg tablet TAKE 1 TABLET BY MOUTH EVERY NIGHT PATIENT REQUIRES AN OFFICE VISIT FOR ADDITIONAL REFILLS. 90 Tab 1    metFORMIN (GLUCOPHAGE) 500 mg tablet Take 1 Tab by mouth daily (with breakfast). 90 Tab 3    calcium carbonate-vitamin D3 (CALTRATE 600 PLUS D) 600 mg (1,500 mg)-800 unit chew Take 1 Tab by mouth daily. 90 Tab 3    cloNIDine HCl (CATAPRES) 0.2 mg tablet TAKE 1 TABLET BY MOUTH EVERY NIGHT 90 Tab 1    lisinopril-hydroCHLOROthiazide (PRINZIDE, ZESTORETIC) 20-25 mg per tablet Take 1 Tab by mouth daily. Patient requires an office visit for additional refills. 90 Tab 1    metoprolol tartrate (LOPRESSOR) 25 mg tablet TAKE 1 TABLET BY MOUTH TWICE A DAY 60 Tab 6    aspirin 81 mg chewable tablet Take 1 Tab by mouth daily. 30 Tab 11    upadacitinib (RINVOQ ER) 15 mg Tb24 Take 15 mg by mouth daily. 28 Tab 0    gabapentin (NEURONTIN) 100 mg capsule Take 1 Cap by mouth daily as needed for Pain. Indications: Neuropathic Pain 30 Cap 2       ALLERGIES    Allergies   Allergen Reactions    Codeine Nausea and Vomiting       PHYSICAL EXAMINATION    Visit Vitals  /70 (BP 1 Location: Left arm, BP Patient Position: Sitting)   Pulse 70   Temp 98.5 °F (36.9 °C) (Oral)   Resp 18   Ht 5' (1.524 m)   Wt 111 lb (50.3 kg)   SpO2 98%   BMI 21.68 kg/m²     Body mass index is 21.68 kg/m².     General: NAD  HEENT: PERRL, anicteric, non-injected sclerae; oropharynx without ulcers, erythema, or exudate. Moist mucous membranes. Lymphatic: No cervical or axillary lymphadenopathy. Cardiovascular: S1, S2,no R/M/G  Pulmonary: CTA b/l. No wheezes/rales/rhonchi. Abdominal: Soft,NTND, + BS. Skin: No rash, nodules, or periungual changes. Neuro: Alert; able to carry normal conversation    Musculoskeletal:   Right hand with some ulnar deviation and all digits are in flexion contractures at the MCP which are only partially reducible. Hypertrophy of right MCPs and right wrist appears to have fused with no active swelling  Left wrist also with fusion with no active swelling. Joint Count 2/21/2020 1/22/2020 12/17/2019 11/19/2019   Patient pain (0-100) 75 65 50 75   MHAQ 0.875 1.125 1.375 1.75   Left wrist- Tender - 0 1 -   Left wrist- Swollen 0 0 - -   Left 1st MCP - Tender 0 - - -   Right shoulder - Tender - - 1 -   Right shoulder - Swollen - - 1 -   Right 3rd MCP - Tender - - 1 -   Right 3rd MCP - Swollen - - 1 -   Tender Joint Count (Total) 0 0 3 -   Swollen Joint Count (Total) 0 0 2 -   Physician Assessment (0-10) 5 4 9 -   Patient Assessment (0-10) 7 9 9 10   CDAI Total (calculated) 12 13 23 -       DATA REVIEW    Prior medical records were reviewed and if applicable are summarized as below:    Labs:   12/2019: Cbc, cmp unremarkable  10/2019: Quant gold, HBV, HCV negative, RF, CCP positive, ESR 61, CRP, CMP normal  7/2019: cbc, CMP, TSH normal    Imaging:   Hand x-rays (10/2019): Personally reviewed images. + severe erosive changes  Foot x-rays (10/2019): Personally reviewed images. + erosive and DJD changes    ASSESSMENT AND PLAN    A 60 y.o. female with hx of CVA with right side hemiparesis, DM, seropositive erosive rheumatoid arthritis on methotrexate 15 mg oral weekly with daily folic acid, Rinvoq 15 mg oral daily and prednisone 10 mg oral daily presents for a follow up visit. The patient has done well with this regimen and has no active swelling today.      # Seropositive erosive rheumatoid arthritis:  - Increase methotrexate to 20 mg oral weekly. Counseled her to be more complaint with this  - daily folic acid  - stop prednisone  - continue Rinvoq 15 mg oral daily    # Adhesive capsulitis of right shoulder:  - due to right hemiparesis most likely    # Diabetes:  - defer to PCP    # Medication Toxicity Monitoring:  - cbc, cmp, lipids today   - Hepatitis B, C: negative 10/2019  - Quant gold: negative 10/2019  - Immunizations: We discussed receiving influenza, Prevar-13, and Pneumovax-23 vaccines as per the CDC guidelines for immunosuppressed patients. - bone health: caltrate prescribed  - I counseled the patient on the risk of avascular necrosis from corticosteroids. For each 20 mg of prednisone taken for over a month, the risk of AVN is 5%. It can also lead to weight gain, mood imbalances, osteoporosis, acne etc.     RTC in 3 months    The patient voiced understanding of the aforementioned assessment and plan. Summary of plan was provided in the After Visit Summary patient instructions. I also provided education about MyChart setup and utility. Ms. Martin has a reminder for a \"due or due soon\" health maintenance. I have asked that she contact her primary care provider for follow-up on this health maintenance.     TODAY'S ORDERS    Orders Placed This Encounter    CBC WITH AUTOMATED DIFF    METABOLIC PANEL, COMPREHENSIVE    LIPID PANEL    methotrexate (RHEUMATREX) 2.5 mg tablet    folic acid (FOLVITE) 1 mg tablet       Future Appointments   Date Time Provider Kaur Herzog   3/13/2020  8:30 AM Cornel Mckenzie  Maria Alejandra Gibson MD    Adult Rheumatology   [facility]  A Part of [facility], [address] [facility]   Phone [phone]  Fax [phone]

## 2020-02-22 LAB
ALBUMIN SERPL-MCNC: 4.1 G/DL (ref 3.8–4.8)
ALBUMIN/GLOB SERPL: 1.5 {RATIO} (ref 1.2–2.2)
ALP SERPL-CCNC: 98 IU/L (ref 39–117)
ALT SERPL-CCNC: 13 IU/L (ref 0–32)
AST SERPL-CCNC: 13 IU/L (ref 0–40)
BASOPHILS # BLD AUTO: 0 X10E3/UL (ref 0–0.2)
BASOPHILS NFR BLD AUTO: 1 %
BILIRUB SERPL-MCNC: 0.4 MG/DL (ref 0–1.2)
BUN SERPL-MCNC: 26 MG/DL (ref 8–27)
BUN/CREAT SERPL: 36 (ref 12–28)
CALCIUM SERPL-MCNC: 9.1 MG/DL (ref 8.7–10.3)
CHLORIDE SERPL-SCNC: 101 MMOL/L (ref 96–106)
CHOLEST SERPL-MCNC: 164 MG/DL (ref 100–199)
CO2 SERPL-SCNC: 22 MMOL/L (ref 20–29)
CREAT SERPL-MCNC: 0.73 MG/DL (ref 0.57–1)
EOSINOPHIL # BLD AUTO: 0.1 X10E3/UL (ref 0–0.4)
EOSINOPHIL NFR BLD AUTO: 1 %
ERYTHROCYTE [DISTWIDTH] IN BLOOD BY AUTOMATED COUNT: 13.9 % (ref 11.7–15.4)
GLOBULIN SER CALC-MCNC: 2.7 G/DL (ref 1.5–4.5)
GLUCOSE SERPL-MCNC: 96 MG/DL (ref 65–99)
HCT VFR BLD AUTO: 33.5 % (ref 34–46.6)
HDLC SERPL-MCNC: 50 MG/DL
HGB BLD-MCNC: 11.3 G/DL (ref 11.1–15.9)
IMM GRANULOCYTES # BLD AUTO: 0 X10E3/UL (ref 0–0.1)
IMM GRANULOCYTES NFR BLD AUTO: 0 %
LDLC SERPL CALC-MCNC: 96 MG/DL (ref 0–99)
LYMPHOCYTES # BLD AUTO: 1.8 X10E3/UL (ref 0.7–3.1)
LYMPHOCYTES NFR BLD AUTO: 31 %
MCH RBC QN AUTO: 30.1 PG (ref 26.6–33)
MCHC RBC AUTO-ENTMCNC: 33.7 G/DL (ref 31.5–35.7)
MCV RBC AUTO: 89 FL (ref 79–97)
MONOCYTES # BLD AUTO: 0.4 X10E3/UL (ref 0.1–0.9)
MONOCYTES NFR BLD AUTO: 8 %
NEUTROPHILS # BLD AUTO: 3.4 X10E3/UL (ref 1.4–7)
NEUTROPHILS NFR BLD AUTO: 59 %
PLATELET # BLD AUTO: 335 X10E3/UL (ref 150–450)
POTASSIUM SERPL-SCNC: 4 MMOL/L (ref 3.5–5.2)
PROT SERPL-MCNC: 6.8 G/DL (ref 6–8.5)
RBC # BLD AUTO: 3.75 X10E6/UL (ref 3.77–5.28)
SODIUM SERPL-SCNC: 139 MMOL/L (ref 134–144)
TRIGL SERPL-MCNC: 90 MG/DL (ref 0–149)
VLDLC SERPL CALC-MCNC: 18 MG/DL (ref 5–40)
WBC # BLD AUTO: 5.8 X10E3/UL (ref 3.4–10.8)

## 2020-04-06 RX ORDER — UPADACITINIB 15 MG/1
TABLET, EXTENDED RELEASE ORAL
Qty: 90 UNSPECIFIED | Refills: 0 | Status: SHIPPED | OUTPATIENT
Start: 2020-04-06 | End: 2020-10-30 | Stop reason: SDUPTHER

## 2020-04-27 ENCOUNTER — VIRTUAL VISIT (OUTPATIENT)
Dept: FAMILY MEDICINE CLINIC | Age: 65
End: 2020-04-27

## 2020-04-27 DIAGNOSIS — E78.00 HYPERCHOLESTEREMIA: ICD-10-CM

## 2020-04-27 DIAGNOSIS — I10 ESSENTIAL HYPERTENSION, MALIGNANT: ICD-10-CM

## 2020-04-27 DIAGNOSIS — I69.354 HEMIPARESIS AFFECTING LEFT SIDE AS LATE EFFECT OF CEREBROVASCULAR ACCIDENT (HCC): ICD-10-CM

## 2020-04-27 DIAGNOSIS — I10 HTN, GOAL BELOW 130/80: ICD-10-CM

## 2020-04-27 DIAGNOSIS — E11.9 DIABETES MELLITUS TYPE 2, CONTROLLED (HCC): ICD-10-CM

## 2020-04-27 DIAGNOSIS — Z86.73 S/P STROKE DUE TO CEREBROVASCULAR DISEASE: ICD-10-CM

## 2020-04-27 DIAGNOSIS — K08.9 POOR DENTITION: Primary | ICD-10-CM

## 2020-04-27 RX ORDER — CLONIDINE HYDROCHLORIDE 0.2 MG/1
TABLET ORAL
Qty: 90 TAB | Refills: 1 | Status: SHIPPED | OUTPATIENT
Start: 2020-04-27 | End: 2020-08-12 | Stop reason: SDUPTHER

## 2020-04-27 RX ORDER — LISINOPRIL AND HYDROCHLOROTHIAZIDE 20; 25 MG/1; MG/1
1 TABLET ORAL DAILY
Qty: 90 TAB | Refills: 1 | Status: SHIPPED | OUTPATIENT
Start: 2020-04-27 | End: 2021-02-05

## 2020-04-27 RX ORDER — SIMVASTATIN 10 MG/1
TABLET, FILM COATED ORAL
Qty: 90 TAB | Refills: 1 | Status: SHIPPED | OUTPATIENT
Start: 2020-04-27 | End: 2020-08-12 | Stop reason: SDUPTHER

## 2020-04-27 RX ORDER — METOPROLOL TARTRATE 25 MG/1
TABLET, FILM COATED ORAL
Qty: 60 TAB | Refills: 6 | Status: SHIPPED | OUTPATIENT
Start: 2020-04-27 | End: 2021-05-27 | Stop reason: SDUPTHER

## 2020-04-27 RX ORDER — METFORMIN HYDROCHLORIDE 500 MG/1
500 TABLET ORAL
Qty: 90 TAB | Refills: 3 | Status: SHIPPED | OUTPATIENT
Start: 2020-04-27

## 2020-04-27 NOTE — PROGRESS NOTES
Chief Complaint   Patient presents with    Follow-up   patient is here for f/u has no concerns at this time no cough or fever noted      1. Have you been to the ER, urgent care clinic since your last visit? Hospitalized since your last visit? No    2. Have you seen or consulted any other health care providers outside of the [address] [place] [address] since your last visit? Include any pap smears or colon screening.  No

## 2020-04-27 NOTE — PROGRESS NOTES
HISTORY OF PRESENT ILLNESS  Jessica Gifford is a 60 y.o. female. HPI    Today's Pt main concerns were provided on virtual visit and Video telemed format, pt consented to such visit secondary to the current national and state emergency in order to decrease the transmission rate of the corona virus,  pt is w/ comorbid history at high risk    Present on VV for the concerns for the current medical conditions and stating that the pt has had no traveling and unaware if the pt has been exposed to covid-19 individual, pt is not currently working, and having no work concerns, not asthmatic, no wheezing not tobacco abuser,  has no fever no cough no dyspnea, no ha, not dizzy, nl smell nl taste, no abd upset,  Not feeling anxious,   no body ache    DMtype II with HTN, RA and hyperchol, no body ache from the statin, , her next visit with Rheum is next months, need all of her meds to be refilled, more active the pain is controlled, last time seen the dentist few yrs ago, now have carries x2 ,   Patient also present for diabetic check,  the patient has been compliancy w/ meds, patient also states that the pt is trying to have a diabetic diet, and eat less of carbohydrates, since last visit patient has been more active with daily walking, patient also states that there is no home monitoring,  ++ Rf needed for today. This time patient denies  tingling sensation, has no polyurea and polydipsia, last a1c was not at target of 7.2% %     Last urine microalbumin 2019 and was abnormal, patient currently on ACE inhibitor. Current Outpatient Medications   Medication Sig Dispense Refill    Rinvoq 15 mg Tb24 TAKE 1 TABLET BY MOUTH 1 TIME A DAY. 90 UNSPECIFIED 0    methotrexate (RHEUMATREX) 2.5 mg tablet Take 8 Tabs by mouth Every Saturday. 90 Tab 1    folic acid (FOLVITE) 1 mg tablet Take 1 Tab by mouth daily. 90 Tab 5    upadacitinib (RINVOQ ER) 15 mg Tb24 Take 15 mg by mouth daily.  90 Tab 0    gabapentin (NEURONTIN) 100 mg capsule Take 1 Cap by mouth daily as needed for Pain. Indications: Neuropathic Pain 30 Cap 2    simvastatin (ZOCOR) 10 mg tablet TAKE 1 TABLET BY MOUTH EVERY NIGHT PATIENT REQUIRES AN OFFICE VISIT FOR ADDITIONAL REFILLS. 90 Tab 1    metFORMIN (GLUCOPHAGE) 500 mg tablet Take 1 Tab by mouth daily (with breakfast). 90 Tab 3    calcium carbonate-vitamin D3 (CALTRATE 600 PLUS D) 600 mg (1,500 mg)-800 unit chew Take 1 Tab by mouth daily. 90 Tab 3    cloNIDine HCl (CATAPRES) 0.2 mg tablet TAKE 1 TABLET BY MOUTH EVERY NIGHT 90 Tab 1    lisinopril-hydroCHLOROthiazide (PRINZIDE, ZESTORETIC) 20-25 mg per tablet Take 1 Tab by mouth daily. Patient requires an office visit for additional refills. 90 Tab 1    metoprolol tartrate (LOPRESSOR) 25 mg tablet TAKE 1 TABLET BY MOUTH TWICE A DAY 60 Tab 6    aspirin 81 mg chewable tablet Take 1 Tab by mouth daily.  30 Tab 11     Allergies   Allergen Reactions    Codeine Nausea and Vomiting     Past Medical History:   Diagnosis Date    Arthritis     hands/all over    Diabetes mellitus ([address] 6/15/2015    Dyspepsia and other specified disorders of function of stomach     Foot pain, bilateral 6/15/2015    Hemiparesis affecting left side as late effect of cerebrovascular accident ([address] 4/18/2014    Hypercholesteremia 4/18/2014    Hypertension     Knee pain, bilateral 6/15/2015    Lipoma of back 5/14/2015    Nausea & vomiting     Prediabetes 5/14/2015    S/P stroke due to cerebrovascular disease 4/18/2014    Screening for breast cancer 9/15/2015    Type II diabetes mellitus with nephropathy ([address] 7/23/2019     Past Surgical History:   Procedure Laterality Date    ABDOMEN SURGERY PROC UNLISTED  2004    hernia repair    COLONOSCOPY N/A 10/17/2019    COLON[facility] performed by Marry [facility]err[facility] at [facility] ENDOSCOPY    HX OTHER SURGICAL      lipoma removed from back    HX TUBAL LIGATION  1984     Family History   Problem Relation Age of Onset    Hypertension Mother    [facility] Diabetes Mother     Arthritis-rheumatoid Father     Hypertension Brother     Stroke Brother      Social History     Tobacco Use    Smoking status: Former Smoker     Packs/day: 0.25     Years: 35.00     Pack years: 8.75     Last attempt to quit: 5/28/2009     Years since quitting: 10.9    Smokeless tobacco: Never Used   Substance Use Topics    Alcohol use: No     Comment: ETOH abuse in past; quit 2012      Lab Results   Component Value Date/Time    Hemoglobin A1c 7.2 (H) 11/07/2019 11:47 AM    Hemoglobin A1c 6.6 (H) 09/15/2015 10:51 AM    Hemoglobin A1c 7.0 (H) 05/14/2015 03:44 PM    Glucose 96 02/21/2020 11:17 AM    Glucose (POC) 112 (H) 06/04/2015 08:08 AM    Microalb/Creat ratio (ug/mg creat.) 96.3 (H) 07/16/2019 03:41 PM    LDL, calculated 96 02/21/2020 11:17 AM    Creatinine 0.73 02/21/2020 11:17 AM      Lab Results   Component Value Date/Time    TSH 1.540 07/16/2019 03:42 PM         Review of Systems   Constitutional: Negative for chills and fever. HENT: Negative for nosebleeds. Eyes: Negative for pain. Respiratory: Negative for cough and wheezing. Cardiovascular: Negative for chest pain and leg swelling. Gastrointestinal: Negative for constipation, diarrhea and nausea. Genitourinary: Negative for frequency. Musculoskeletal: Negative for joint pain and myalgias. Skin: Negative for rash. Neurological: Negative for loss of consciousness. Endo/Heme/Allergies: Does not bruise/bleed easily. Psychiatric/Behavioral: Negative for depression. The patient is not nervous/anxious and does not have insomnia. All other systems reviewed and are negative. Physical Exam  Constitutional:       Appearance: She is obese. She is not ill-appearing or toxic-appearing. HENT:      Head: Normocephalic and atraumatic. Mouth/Throat:      Mouth: Mucous membranes are moist.   Neurological:      Mental Status: She is alert and oriented to person, place, and time.    Psychiatric:         Mood and Affect: Mood normal.         Behavior: Behavior normal.         Thought Content: Thought content normal.         Judgment: Judgment normal.         ASSESSMENT and PLAN  Diagnoses and all orders for this visit:    1. Poor dentition  -     REFERRAL TO DENTISTRY    2. Hemiparesis affecting left side as late effect of cerebrovascular accident ([facility]  -     cloNIDine HCL (CATAPRES) 0.2 mg tablet; TAKE 1 TABLET BY MOUTH EVERY NIGHT  -     simvastatin (ZOCOR) 10 mg tablet; TAKE 1 TABLET BY MOUTH EVERY NIGHT PATIENT REQUIRES AN OFFICE VISIT FOR ADDITIONAL REFILLS. 3. Hypercholesteremia  -     cloNIDine HCL (CATAPRES) 0.2 mg tablet; TAKE 1 TABLET BY MOUTH EVERY NIGHT  -     metFORMIN (GLUCOPHAGE) 500 mg tablet; Take 1 Tab by mouth daily (with breakfast). -     simvastatin (ZOCOR) 10 mg tablet; TAKE 1 TABLET BY MOUTH EVERY NIGHT PATIENT REQUIRES AN OFFICE VISIT FOR ADDITIONAL REFILLS. 4. S/P stroke due to cerebrovascular disease  -     cloNIDine HCL (CATAPRES) 0.2 mg tablet; TAKE 1 TABLET BY MOUTH EVERY NIGHT  -     simvastatin (ZOCOR) 10 mg tablet; TAKE 1 TABLET BY MOUTH EVERY NIGHT PATIENT REQUIRES AN OFFICE VISIT FOR ADDITIONAL REFILLS. 5. HTN, goal below 130/80  -     metoprolol tartrate (LOPRESSOR) 25 mg tablet; TAKE 1 TABLET BY MOUTH TWICE A DAY    6. Type II diabetes mellitus with complication, uncontrolled (HCC)  -     metoprolol tartrate (LOPRESSOR) 25 mg tablet; TAKE 1 TABLET BY MOUTH TWICE A DAY    7. Diabetes mellitus type 2, controlled (HCC)  -     metFORMIN (GLUCOPHAGE) 500 mg tablet; Take 1 Tab by mouth daily (with breakfast). 8. Essential hypertension, malignant  -     lisinopril-hydroCHLOROthiazide (PRINZIDE, ZESTORETIC) 20-25 mg per tablet; Take 1 Tab by mouth daily. Patient requires an office visit for additional refills.       Concern abdout COVID-19 addressed and detailed, pt was told that the best way to prevent illness is to avoid being exposed to this virus, by clean hands often, use a hand  that contains at least 60% alcohol, Avoid touching your eyes, nose, and mouth with unwashed hand, Avoid close contact with people who are sick , Put distance between yourself and other people, Stay home if you are sick, except to get medical care, Cover your mouth and nose, Throw used tissues in the trash, Wear a facemask if you are sick,     Pursuant to the emergency declaration under the [address]  and [place] [address] waiver authority and the [employer] Act, this Virtual Visit was conducted, with patient's consent, to reduce the patient's risk of exposure to COVID-19 and provide continuity of care for an established patient. Pt was also told if develop dyspnea needs to call 911 or go to er, call for margi advise, pt agreed with todays recommendations,     Services were provided through a Video synchronous discussion virtually to substitute for in-person appointment.

## 2020-04-29 ENCOUNTER — HOSPITAL ENCOUNTER (OUTPATIENT)
Dept: MAMMOGRAPHY | Age: 65
Discharge: HOME | End: 2020-04-29
Attending: FAMILY MEDICINE
Payer: MEDICAID

## 2020-04-29 DIAGNOSIS — R92.8 ABNORMAL MAMMOGRAM: ICD-10-CM

## 2020-04-29 PROCEDURE — 77065 DX MAMMO INCL CAD UNI: CPT

## 2020-05-21 ENCOUNTER — VIRTUAL VISIT (OUTPATIENT)
Dept: RHEUMATOLOGY | Age: 65
End: 2020-05-21

## 2020-05-21 DIAGNOSIS — Z79.60 LONG-TERM USE OF IMMUNOSUPPRESSANT MEDICATION: Primary | ICD-10-CM

## 2020-05-21 DIAGNOSIS — M05.9 SEROPOSITIVE RHEUMATOID ARTHRITIS (HCC): ICD-10-CM

## 2020-05-21 DIAGNOSIS — Z86.73 S/P STROKE DUE TO CEREBROVASCULAR DISEASE: ICD-10-CM

## 2020-05-21 DIAGNOSIS — E11.8 CONTROLLED DIABETES MELLITUS TYPE 2 WITH COMPLICATIONS, UNSPECIFIED WHETHER LONG TERM INSULIN USE (HCC): ICD-10-CM

## 2020-05-21 RX ORDER — FOLIC ACID 1 MG/1
1 TABLET ORAL DAILY
Qty: 90 TAB | Refills: 5 | Status: SHIPPED | OUTPATIENT
Start: 2020-05-21

## 2020-05-21 RX ORDER — METHOTREXATE 2.5 MG/1
20 TABLET ORAL
Qty: 90 TAB | Refills: 1 | Status: SHIPPED | OUTPATIENT
Start: 2020-05-23 | End: 2020-11-23 | Stop reason: SDUPTHER

## 2020-05-21 NOTE — PROGRESS NOTES
Diane Mccallum is a 60 y.o. female who was seen by synchronous (real-time)telephone visit on 5/21/2020. HISTORY OF DISEASE: Seropositive erosive rheumatoid arthritis    Year of diagnosis: 2019  First visit with me: 10/2019  Cumulative disease manifestations: erosions  Positive serologies/labs: RF, CCP  Negative serologies/labs: Other co-morbidities: CVA with residual right sided hemiparesis, DM with neuropathy, HTN  Relapses:      Past treatment history:  Prednisone: prednisone 5 mg oral daily  Non-biologic DMARD: Methotrexate 20 mg oral weekly (11/2019-->increased to 20 mg oral weekly in 2/2020)  Biologic: Rinvoq 12/2019-present  Other:    HISTORY OF PRESENT ILLNESS     Previous medical records reviewed and summarized: yes    The patient notes she is doing well. The patient notes her joints are doing okay. She has pain in her feet. She has pain in her shoulders as well. No swelling. Morning stiffness for a few minutes. She thinks the medications are helping her.      REVIEW OF SYSTEMS    Positives as per history  Negatives as follows:  Freda Lacks:    Denies unexplained persistent fevers, weight change, chronic fatigue  HEAD/EYES:              Denies eye redness, blurry vision or sudden loss of vision, dry eyes, HA, temporal artery pain  ENT:                            Denies oral/nasal ulcers, recurrent sinus infections, dry mouth  RESPIRATORY:         No pleuritic pain, history of pleural effusions, hemoptysis, exertional dyspnea  CARDIOVASCULAR:             Denies chest pain, history of pericardial effusions  GASTRO:                    Denies heartburn, esophageal dysmotility, abdominal pain, nausea, vomiting, diarrhea, blood in the stool  HEMATOLOGIC:        No easy bruising, purpura, swollen lymph nodes  SKIN:                           Denies alopecia, ulcers, nodules, sun sensitivity, unexplained persistent rash   VASCULAR:                Denies edema, cyanosis, raynaud phenomenon  NEUROLOGIC:           Denies specific muscle weakness, paresthesias   PSYCHIATRIC:           No sleep disturbance / snoring, depression, anxiety  MSK:                           No morning stiffness >1 hour, SI joint pain, persistent joint swelling, persistent joint pain    PAST MEDICAL HISTORY    Past Medical History:   Diagnosis Date    Arthritis     hands/all over    Diabetes mellitus ([facility] 6/15/2015    Dyspepsia and other specified disorders of function of stomach     Foot pain, bilateral 6/15/2015    Hemiparesis affecting left side as late effect of cerebrovascular accident ([facility] 4/18/2014    Hypercholesteremia 4/18/2014    Hypertension     Knee pain, bilateral 6/15/2015    Lipoma of back 5/14/2015    Nausea & vomiting     Prediabetes 5/14/2015    S/P stroke due to cerebrovascular disease 4/18/2014    Screening for breast cancer 9/15/2015    [facility] 7/23/2019        Past Surgical History:   Procedure Laterality Date    ABDOMEN SURGERY PROC UNLISTED  2004    hernia repair    COLONOSCOPY N/A 10/17/2019    COL[facility] performed by [facility] at [facility] ENDOSCOPY    HX OTHER SURGICAL      lipoma removed from back    HX TUBAL LIGATION  1984       FAMILY HISTORY    Family History   Problem Relation Age of Onset    Hypertension Mother     Diabetes Mother     Arthritis-rheumatoid Father     Hypertension Brother     Stroke Brother        SOCIAL HISTORY    Social History     Tobacco Use    Smoking status: Former Smoker     Packs/day: 0.25     Years: 35.00     Pack years: 8.75     Last attempt to quit: 5/28/2009     Years since quitting: 10.9    Smokeless tobacco: Never Used   Substance Use Topics    Alcohol use: No     Comment: ETOH abuse in past; quit 2012    Drug use: No       MEDICATIONS    Current Outpatient Medications   Medication Sig Dispense Refill    [START ON 5/23/2020] methotrexate (RHEUMATREX) 2.5 mg tablet Take 8 Tabs by mouth Every Saturday. 90 Tab 1    folic acid (FOLVITE) 1 mg tablet Take 1 Tab by mouth daily. 90 Tab 5    cloNIDine HCL (CATAPRES) 0.2 mg tablet TAKE 1 TABLET BY MOUTH EVERY NIGHT 90 Tab 1    metoprolol tartrate (LOPRESSOR) 25 mg tablet TAKE 1 TABLET BY MOUTH TWICE A DAY 60 Tab 6    metFORMIN (GLUCOPHAGE) 500 mg tablet Take 1 Tab by mouth daily (with breakfast). 90 Tab 3    simvastatin (ZOCOR) 10 mg tablet TAKE 1 TABLET BY MOUTH EVERY NIGHT PATIENT REQUIRES AN OFFICE VISIT FOR ADDITIONAL REFILLS. 90 Tab 1    lisinopril-hydroCHLOROthiazide (PRINZIDE, ZESTORETIC) 20-25 mg per tablet Take 1 Tab by mouth daily. Patient requires an office visit for additional refills. 90 Tab 1    Rinvoq 15 mg Tb24 TAKE 1 TABLET BY MOUTH 1 TIME A DAY. 90 UNSPECIFIED 0    upadacitinib (RINVOQ ER) 15 mg Tb24 Take 15 mg by mouth daily. 90 Tab 0    calcium carbonate-vitamin D3 (CALTRATE 600 PLUS D) 600 mg (1,500 mg)-800 unit chew Take 1 Tab by mouth daily. 90 Tab 3    aspirin 81 mg chewable tablet Take 1 Tab by mouth daily. 30 Tab 11       ALLERGIES    Allergies   Allergen Reactions    Codeine Nausea and Vomiting       PHYSICAL EXAMINATION    Conversing normally    Due to this being a TeleHealth evaluation, many elements of the physical examination are unable to be assessed.      Joint Count 2/21/2020 1/22/2020 12/17/2019 11/19/2019   Patient pain (0-100) 75 65 50 75   MHAQ 0.875 1.125 1.375 1.75   Left wrist- Tender - 0 1 -   Left wrist- Swollen 0 0 - -   Left 1st MCP - Tender 0 - - -   Right shoulder - Tender - - 1 -   Right shoulder - Swollen - - 1 -   Right 3rd MCP - Tender - - 1 -   Right 3rd MCP - Swollen - - 1 -   Tender Joint Count (Total) 0 0 3 -   Swollen Joint Count (Total) 0 0 2 -   Physician Assessment (0-10) 5 4 9 -   Patient Assessment (0-10) 7 9 9 10   CDAI Total (calculated) 12 13 23 -       DATA REVIEW    Prior medical records were reviewed and if applicable are summarized as below:    Labs:   2/2020: Cbc, cmp unremarkable, lipids normal  12/2019: Cbc, cmp unremarkable  10/2019: Quant gold, HBV, HCV negative, RF, CCP positive, ESR 61, CRP, CMP normal  7/2019: cbc, CMP, TSH normal    Imaging:   Hand x-rays (10/2019): Personally reviewed images. + severe erosive changes  Foot x-rays (10/2019): Personally reviewed images. + erosive and DJD changes    ASSESSMENT AND PLAN    A 60 y.o. female with hx of CVA with right side hemiparesis, DM, seropositive erosive rheumatoid arthritis on methotrexate 15 mg oral weekly with daily folic acid, Rinvoq 15 mg oral daily presents for a follow up visit. The patient has done well with this regimen. # Seropositive erosive rheumatoid arthritis:  - continue methotrexate 20 mg oral weekly  - daily folic acid  - continue Rinvoq 15 mg oral daily    # Adhesive capsulitis of right shoulder:  - due to right hemiparesis most likely  - still with pain  - can try steroid injection at next visit    # Diabetes:  - defer to PCP    # Medication Toxicity Monitoring:  - cbc, cmp, lipids reviewed  - Hepatitis B, C: negative 10/2019  - Quant gold: negative 10/2019  - Immunizations: We discussed receiving influenza, Prevar-13, and Pneumovax-23 vaccines as per the CDC guidelines for immunosuppressed patients. - bone health: caltrate prescribed     RTC in 4 months  Time spent in direct patient care 9 minutes  The patient voiced understanding of the aforementioned assessment and plan. Summary of plan was provided in the After Visit Summary patient instructions. I also provided education about MyChart setup and utility. Ms. Sameer Pham has a reminder for a \"due or due soon\" health maintenance. I have asked that she contact her primary care provider for follow-up on this health maintenance. TODAY'S ORDERS    Orders Placed This Encounter    methotrexate (RHEUMATREX) 2.5 mg tablet    folic acid (FOLVITE) 1 mg tablet       No future appointments.   We discussed the expected course, resolution and complications of the diagnosis(es) in detail. Medication risks, benefits, costs, interactions, and alternatives were discussed as indicated. I advised her to contact the office if her condition worsens, changes or fails to improve as anticipated. She expressed understanding with the diagnosis(es) and plan. Pursuant to the emergency declaration under the [address] [place] [address] waiver authority and the [employer] and [employer] Act, this Virtual  Visit was conducted, with patient's consent, to reduce the patient's risk of exposure to COVID-19 and provide continuity of care for an established patient. Services were provided through an audio synchronous discussion virtually to substitute for in-person clinic visit.     Michelle Gonzalez MD    Adult Rheumatology   [facility]  A Part of [facility], [address]   Phone [phone]  Fax [phone]

## 2020-05-21 NOTE — PATIENT INSTRUCTIONS
Please fill out your [address] you will receive after your visit in the mail or via [address]

## 2020-08-12 ENCOUNTER — TELEPHONE (OUTPATIENT)
Dept: FAMILY MEDICINE CLINIC | Age: 65
End: 2020-08-12

## 2020-08-12 DIAGNOSIS — I69.354 HEMIPARESIS AFFECTING LEFT SIDE AS LATE EFFECT OF CEREBROVASCULAR ACCIDENT (HCC): ICD-10-CM

## 2020-08-12 DIAGNOSIS — Z86.73 S/P STROKE DUE TO CEREBROVASCULAR DISEASE: ICD-10-CM

## 2020-08-12 DIAGNOSIS — E78.00 HYPERCHOLESTEREMIA: ICD-10-CM

## 2020-08-12 NOTE — TELEPHONE ENCOUNTER
----- Message from Valentina Dinh sent at 8/12/2020 12:02 PM EDT -----  Regarding: Dr. Brent Denny: [phone] (if not patient): Pt  Relationship of caller (if not patient): n/a  Best contact number(s): [phone]  Name of medication and dosage if known: \"Simibastatin\" 10 mg and \"Clonidine\" 0.2 mg  Is patient out of this medication (yes/no): yes  Pharmacy name: [facility]  Pharmacy listed in chart? (yes/no): yes  Pharmacy phone number: [phone]  Date of last visit: 4/27/20  Details to clarify the request- Inquiring if refills are available.

## 2020-08-14 RX ORDER — SIMVASTATIN 10 MG/1
TABLET, FILM COATED ORAL
Qty: 90 TAB | Refills: 1 | Status: SHIPPED | OUTPATIENT
Start: 2020-08-14 | End: 2021-09-03

## 2020-08-14 RX ORDER — CLONIDINE HYDROCHLORIDE 0.2 MG/1
TABLET ORAL
Qty: 90 TAB | Refills: 1 | Status: SHIPPED | OUTPATIENT
Start: 2020-08-14 | End: 2021-09-03

## 2020-09-23 ENCOUNTER — DOCUMENTATION ONLY (OUTPATIENT)
Dept: RHEUMATOLOGY | Age: 65
End: 2020-09-23

## 2020-09-23 NOTE — PROGRESS NOTES
Can you call patient and tell them they no showed. I will not see them now because we have a waiting list for new pediatric patients. This patient can be put on Dr. Abelardo Abernathy schedule for November or December whenever he has an availability.

## 2020-10-23 ENCOUNTER — TELEPHONE (OUTPATIENT)
Dept: FAMILY MEDICINE CLINIC | Age: 65
End: 2020-10-23

## 2020-10-23 NOTE — TELEPHONE ENCOUNTER
----- Message from Saranya Miller sent at 10/23/2020 11:57 AM EDT -----  Regarding: /Refill  Medication Refill    Caller (if not patient): Self      Relationship of caller (if not patient): Self      Best contact number(s): [phone]      Name of medication and dosage if known: \"Lisinopril\" 20-25 mg      Is patient out of this medication (yes/no): Yes      [facility], [facility] Michael Srivastava listed in chart? (yes/no): Yes  Pharmacy phone number: [phone]      Details to clarify the request: Pt is out of medication.       Saranya Miller

## 2020-10-26 NOTE — TELEPHONE ENCOUNTER
Name and  Verified. Patient stated that she is fine on her hypertension medication at this time and will call if she needs a refill.

## 2020-10-30 DIAGNOSIS — M05.9 SEROPOSITIVE RHEUMATOID ARTHRITIS (HCC): Primary | ICD-10-CM

## 2020-10-30 DIAGNOSIS — Z79.899 ONGOING USE OF POSSIBLY TOXIC MEDICATION: ICD-10-CM

## 2020-10-30 RX ORDER — UPADACITINIB 15 MG/1
1 TABLET, EXTENDED RELEASE ORAL DAILY
Qty: 90 UNSPECIFIED | Refills: 0 | Status: SHIPPED | OUTPATIENT
Start: 2020-10-30 | End: 2021-01-11

## 2020-10-30 NOTE — TELEPHONE ENCOUNTER
----- Message from Anay Payne RN sent at 10/30/2020  9:20 AM EDT -----  Regarding: FW: NIRAJ/TELEPHONE  Contact: [phone]    ----- Message -----  From: Krishna Barron  Sent: 10/30/2020   9:07 AM EDT  To: [facility] Nurse Pool  Subject: GRANT/MD/TELEPHONE                               Medication Refill    Caller (if not patient): Yvrose Zendejas.       Relationship of caller (if not patient): Rep form [employer]       Vance contact number(s): [phone]      Name of medication and dosage if known: Rinvog 15 mg 90 supply      Is patient out of this medication (yes/no): Yes      Pharmacy name: [facility] listed in chart? (yes/no): Yes    Pharmacy phone number:  [phone]      Details to clarify the request: Darryle Hotter from [address]  Rinvog 15 mg 90 day supply      Kaela Cheung

## 2020-11-19 ENCOUNTER — OFFICE VISIT (OUTPATIENT)
Dept: RHEUMATOLOGY | Age: 65
End: 2020-11-19
Payer: MEDICAID

## 2020-11-19 VITALS
RESPIRATION RATE: 18 BRPM | SYSTOLIC BLOOD PRESSURE: 125 MMHG | TEMPERATURE: 97.4 F | HEIGHT: 60 IN | DIASTOLIC BLOOD PRESSURE: 82 MMHG | BODY MASS INDEX: 21.6 KG/M2 | OXYGEN SATURATION: 97 % | HEART RATE: 72 BPM | WEIGHT: 110 LBS

## 2020-11-19 DIAGNOSIS — M05.9 SEROPOSITIVE RHEUMATOID ARTHRITIS (HCC): Primary | ICD-10-CM

## 2020-11-19 DIAGNOSIS — Z79.899 ONGOING USE OF POSSIBLY TOXIC MEDICATION: ICD-10-CM

## 2020-11-19 PROCEDURE — 99215 OFFICE O/P EST HI 40 MIN: CPT | Performed by: INTERNAL MEDICINE

## 2020-11-19 NOTE — PROGRESS NOTES
Chief Complaint   Patient presents with    Arthritis     feet     1. Have you been to the ER, urgent care clinic since your last visit? Hospitalized since your last visit? No    2. Have you seen or consulted any other health care providers outside of the [address] [place] [address] since your last visit? Include any pap smears or colon screening.  No

## 2020-11-19 NOTE — PROGRESS NOTES
Alba Valentin is a 63 y.o. female who was seen in-office on 11/19/2020 for followup. HISTORY OF DISEASE: Seropositive erosive rheumatoid arthritis    Year of diagnosis: 2019  First visit with this clinic: 10/2019  Cumulative disease manifestations: erosions  Positive serologies/labs: RF, CCP  Negative serologies/labs: Other co-morbidities: CVA with residual right sided hemiparesis, DM with neuropathy, HTN  Relapses:      Past treatment history:  Prednisone: prednisone 5 mg oral daily weaned off in early 2020. Non-biologic DMARD: Methotrexate 20 mg oral weekly (11/2019-->increased to 20 mg oral weekly in 2/2020)  Biologic: Rinvoq 12/2019-present  Other:    HISTORY OF PRESENT ILLNESS     Previous medical records reviewed and summarized: yes    Having more diarrhea, stomach bloated. Takes methotrexate 8 pills weekly, also Rinvoq. Off of prednisone now. Feet are the main joints that bother her now, worse at night. Weight overall stable, no changes since start of the year. Only 5 minutes morning stiffness.       REVIEW OF SYSTEMS    Positives as per history  Negatives as follows:  Rexshena Nelsonk:    Denies unexplained persistent fevers, weight change, chronic fatigue  HEAD/EYES:              Denies eye redness, blurry vision or sudden loss of vision, dry eyes, HA, temporal artery pain  ENT:                            Denies oral/nasal ulcers, recurrent sinus infections, dry mouth  RESPIRATORY:         No pleuritic pain, history of pleural effusions, hemoptysis, exertional dyspnea  CARDIOVASCULAR:             Denies chest pain, history of pericardial effusions  GASTRO:                    Denies heartburn, esophageal dysmotility, abdominal pain, nausea, vomiting, diarrhea, blood in the stool  HEMATOLOGIC:        No easy bruising, purpura, swollen lymph nodes  SKIN:                           Denies alopecia, ulcers, nodules, sun sensitivity, unexplained persistent rash   VASCULAR: Denies edema, cyanosis, raynaud phenomenon  NEUROLOGIC:           Denies specific muscle weakness, paresthesias   PSYCHIATRIC:           No sleep disturbance / snoring, depression, anxiety  MSK:                           No morning stiffness >1 hour, SI joint pain, persistent joint swelling, persistent joint pain    PAST MEDICAL HISTORY    Past Medical History:   Diagnosis Date    Arthritis     hands/all over    Diabetes mellitus ([facility] 6/15/2015    Dyspepsia and other specified disorders of function of stomach     Foot pain, bilateral 6/15/2015    Hemiparesis affecting left side as late effect of cerebrovascular accident ([address] 2014    Hypercholesteremia 2014    Hypertension     Knee pain, bilateral 6/15/2015    Lipoma of back 2015    Nausea & vomiting     Prediabetes 2015    S/P stroke due to cerebrovascular disease 2014    Screening for breast cancer 9/15/2015    Type II diabetes mellitus with nephropathy ([facility] 2019        Past Surgical History:   Procedure Laterality Date    ABDOMEN SURGERY PROC UNLISTED      hernia repair    COLONOSCOPY N/A 10/17/2019    [facility] ENDOSCOPY    HX OTHER SURGICAL      lipoma removed from back    HX TUBAL LIGATION  1984       FAMILY HISTORY    Family History   Problem Relation Age of Onset    Hypertension Mother     Diabetes Mother     Arthritis-rheumatoid Father     Hypertension Brother     Stroke Brother        SOCIAL HISTORY    Social History     Tobacco Use    Smoking status: Former Smoker     Packs/day: 0.25     Years: 35.00     Pack years: 8.75     Last attempt to quit: 2009     Years since quittin.4    Smokeless tobacco: Never Used   Substance Use Topics    Alcohol use: No     Comment: ETOH abuse in past; quit     Drug use: No       MEDICATIONS    Current Outpatient Medications   Medication Sig Dispense Refill    upadacitinib (Rinvoq) 15 mg Tb24 Take 1 Tab by mouth daily. 90 UNSPECIFIED 0    simvastatin (ZOCOR) 10 mg tablet TAKE 1 TABLET BY MOUTH EVERY NIGHT PATIENT REQUIRES AN OFFICE VISIT FOR ADDITIONAL REFILLS. 90 Tab 1    cloNIDine HCL (CATAPRES) 0.2 mg tablet TAKE 1 TABLET BY MOUTH EVERY NIGHT 90 Tab 1    methotrexate (RHEUMATREX) 2.5 mg tablet Take 8 Tabs by mouth Every Saturday. 90 Tab 1    folic acid (FOLVITE) 1 mg tablet Take 1 Tab by mouth daily. 90 Tab 5    metoprolol tartrate (LOPRESSOR) 25 mg tablet TAKE 1 TABLET BY MOUTH TWICE A DAY 60 Tab 6    metFORMIN (GLUCOPHAGE) 500 mg tablet Take 1 Tab by mouth daily (with breakfast). 90 Tab 3    lisinopril-hydroCHLOROthiazide (PRINZIDE, ZESTORETIC) 20-25 mg per tablet Take 1 Tab by mouth daily. Patient requires an office visit for additional refills. 90 Tab 1    calcium carbonate-vitamin D3 (CALTRATE 600 PLUS D) 600 mg (1,500 mg)-800 unit chew Take 1 Tab by mouth daily. 90 Tab 3    aspirin 81 mg chewable tablet Take 1 Tab by mouth daily. 30 Tab 11       ALLERGIES    Allergies   Allergen Reactions    Codeine Nausea and Vomiting       PHYSICAL EXAMINATION      Joint Count 2/21/2020 1/22/2020 12/17/2019 11/19/2019   Patient pain (0-100) 75 65 50 75   MHAQ 0.875 1.125 1.375 1.75   Left wrist- Tender - 0 1 -   Left wrist- Swollen 0 0 - -   Left 1st MCP - Tender 0 - - -   Right shoulder - Tender - - 1 -   Right shoulder - Swollen - - 1 -   Right 3rd MCP - Tender - - 1 -   Right 3rd MCP - Swollen - - 1 -   Tender Joint Count (Total) 0 0 3 -   Swollen Joint Count (Total) 0 0 2 -   Physician Assessment (0-10) 5 4 9 -   Patient Assessment (0-10) 7 9 9 10   CDAI Total (calculated) 12 13 23 -     General:  The patient is well developed, well nourished, alert, and in no apparent distress. Eyes: Sclera are anicteric. No conjunctival injection. HEENT:  Oropharynx is clear. No oral ulcers. Adequate salivary pooling. No cervical or supraclavicular lymphadenopathy.    Lungs:  Clear to auscultation bilaterally, without wheeze or stridor. Normal respiratory effort. Cor:  Regular rate and rhythm. No murmur rub or gallop. Abdomen: Soft, non-tender, without hepatomegaly or masses. Extremities: No calf tenderness or edema. Warm and well perfused. Skin:  No significant abnormalities. Neuro: Nonfocal  Musculoskeletal:    A comprehensive musculoskeletal exam was performed for all joints of each upper and lower extremity and assessed for swelling, tenderness and range of motion. Results are documented as below:  No evidence of synovitis in the small joints of the hands, wrists, shoulders, elbows, hips, knees or ankles. Elbows limited from full extension by 5deg  Bilateral wrist near-fusion, <5deg flexion/extension  Right MCP3-5 fixed flexion deformities to 90deg  Left ankle tenderness without warmth  Bilateral hammertoes left > right, no MTP squeeze tenderness          DATA REVIEW    Prior medical records were reviewed and if applicable are summarized as below:    Labs:   2/2020: Cbc, cmp unremarkable, lipids normal  12/2019: Cbc, cmp unremarkable  10/2019: Quant gold, HBV, HCV negative, RF, CCP positive, ESR 61, CRP, CMP normal  7/2019: cbc, CMP, TSH normal    Imaging:   Hand x-rays (10/2019): Personally reviewed images. + severe erosive changes  Foot x-rays (10/2019): Personally reviewed images. + erosive and DJD changes    ASSESSMENT AND PLAN    A 63 y.o. female with hx of CVA with right side hemiparesis, DM, seropositive erosive rheumatoid arthritis on methotrexate 20 mg oral weekly with daily folic acid, Rinvoq 15 mg oral daily presents for a follow up visit. The patient has done well with this regimen and is having chronic GI discomfort which may be in part related to the methotrexate. Suspect most of her pain at this point relates to secondary ostoearthritis; decreasing methotrexate to 15mg weekly and updating plain films to ensure no interval progression of erosive changes.     # Seropositive erosive rheumatoid arthritis:  - Reduce methotrexate to 15mg oral weekly  - daily folic acid  - continue Rinvoq 15 mg oral daily  - Update plain films    # Adhesive capsulitis of right shoulder:  - right hemiparesis most likely contributing  - still with pain  - Not interested in steroid injection today. # Medication Toxicity Monitoring:  - cbc, cmp, lipids reordered--overdue for labs  - Hepatitis B, C: negative 10/2019  - Quant gold: negative 10/2019  - Immunizations: We discussed receiving influenza, Prevar-13, and Pneumovax-23 vaccines as per the CDC guidelines for immunosuppressed patients. - bone health: caltrate prescribed     Patient Instructions   1. Decrease methotrexate to 15mg (6 pills) once every 7 days. Continue taking folic acid daily and Rinvoq daily. 2. Labs ASAP, and one more time before next visit in 3 months. 3. Xrays to make sure your bones aren't getting more damaged on your current regimen. 4. Return in 3-4 months. For breakthrough pain in the meantime, tylenol 650mg 3-4 times per day is safe. RTC in 3-4 months    The patient voiced understanding of the aforementioned assessment and plan. Summary of plan was provided in the After Visit Summary patient instructions. I also provided education about MyChart setup and utility. Ms. Brandy Soni has a reminder for a \"due or due soon\" health maintenance. I have asked that she contact her primary care provider for follow-up on this health maintenance. TODAY'S ORDERS    Orders Placed This Encounter    XR HAND RT MIN 3 V    XR HAND LT MIN 3 V    LIPID PANEL    C REACTIVE PROTEIN, QT    SED RATE (ESR)    METABOLIC PANEL, COMPREHENSIVE    CBC WITH AUTOMATED DIFF       Future Appointments   Date Time Provider Kaur Rosenda   3/2/2021 11:20 AM Mukesh Negrete MD [facility]     We discussed the expected course, resolution and complications of the diagnosis(es) in detail.   Medication risks, benefits, costs, interactions, and alternatives were discussed as indicated. I advised her to contact the office if her condition worsens, changes or fails to improve as anticipated. She expressed understanding with the diagnosis(es) and plan.      Tim Fuentes MD    Adult Rheumatology   [facility]  [facility], [address] [place] [address]   Phone [phone]  Fax [phone]

## 2020-11-19 NOTE — PATIENT INSTRUCTIONS
1. Decrease methotrexate to 15mg (6 pills) once every 7 days. Continue taking folic acid daily and Rinvoq daily. 2. Labs ASAP, and one more time before next visit in 3 months. 3. Xrays to make sure your bones aren't getting more damaged on your current regimen. 4. Return in 3-4 months. For breakthrough pain in the meantime, tylenol 650mg 3-4 times per day is safe.

## 2020-11-23 DIAGNOSIS — M05.9 SEROPOSITIVE RHEUMATOID ARTHRITIS (HCC): Primary | ICD-10-CM

## 2020-11-23 RX ORDER — METHOTREXATE 2.5 MG/1
20 TABLET ORAL
Qty: 90 TAB | Refills: 1 | Status: CANCELLED | OUTPATIENT
Start: 2020-11-28

## 2020-11-23 RX ORDER — METHOTREXATE 2.5 MG/1
15 TABLET ORAL
Qty: 90 TAB | Refills: 0 | Status: SHIPPED | OUTPATIENT
Start: 2020-11-28 | End: 2021-08-26

## 2021-01-11 DIAGNOSIS — M05.9 SEROPOSITIVE RHEUMATOID ARTHRITIS (HCC): ICD-10-CM

## 2021-01-11 DIAGNOSIS — Z79.899 ONGOING USE OF POSSIBLY TOXIC MEDICATION: ICD-10-CM

## 2021-01-11 RX ORDER — UPADACITINIB 15 MG/1
TABLET, EXTENDED RELEASE ORAL
Qty: 90 UNSPECIFIED | Refills: 3 | Status: SHIPPED | OUTPATIENT
Start: 2021-01-11 | End: 2021-01-19 | Stop reason: SDUPTHER

## 2021-01-18 ENCOUNTER — DOCUMENTATION ONLY (OUTPATIENT)
Dept: PHARMACY | Age: 66
End: 2021-01-18

## 2021-01-18 NOTE — PROGRESS NOTES
[facility] at [address] [facility] Update    Date: 01/18/21    Daija Hand 1955    Medication: Rinvoq 15 mg    Prescription needs to be transferred to [address] (phone: [phone]). Nurse  at the [facility], was notified that this specialty prescription needs to be transferred to the insurance mandated specialty pharmacy listed above.      Te Ryan, [address] at [facility],  [place], [address]  phone: [phone]   fax: [phone]

## 2021-01-19 DIAGNOSIS — M05.9 SEROPOSITIVE RHEUMATOID ARTHRITIS (HCC): ICD-10-CM

## 2021-01-19 DIAGNOSIS — Z79.899 ONGOING USE OF POSSIBLY TOXIC MEDICATION: ICD-10-CM

## 2021-01-19 RX ORDER — UPADACITINIB 15 MG/1
15 TABLET, EXTENDED RELEASE ORAL DAILY
Qty: 90 UNSPECIFIED | Refills: 3 | Status: SHIPPED | OUTPATIENT
Start: 2021-01-19 | End: 2021-08-26

## 2021-02-05 DIAGNOSIS — I10 ESSENTIAL HYPERTENSION, MALIGNANT: ICD-10-CM

## 2021-02-05 RX ORDER — LISINOPRIL AND HYDROCHLOROTHIAZIDE 20; 25 MG/1; MG/1
1 TABLET ORAL DAILY
Qty: 90 TAB | Refills: 1 | Status: SHIPPED | OUTPATIENT
Start: 2021-02-05 | End: 2021-10-25

## 2021-02-05 RX ORDER — LISINOPRIL AND HYDROCHLOROTHIAZIDE 20; 25 MG/1; MG/1
1 TABLET ORAL DAILY
Qty: 90 TAB | Refills: 1 | Status: SHIPPED | OUTPATIENT
Start: 2021-02-05 | End: 2021-05-27 | Stop reason: SDUPTHER

## 2021-02-05 NOTE — TELEPHONE ENCOUNTER
Patient is requesting a RX refill lisinopril-hydroCHLOROthiazide (PRINZIDE, ZESTORETIC) 20-25 mg per tablet she can be reached @ [phone]

## 2021-03-02 ENCOUNTER — OFFICE VISIT (OUTPATIENT)
Dept: RHEUMATOLOGY | Age: 66
End: 2021-03-02
Payer: MEDICAID

## 2021-03-02 VITALS
SYSTOLIC BLOOD PRESSURE: 104 MMHG | HEIGHT: 60 IN | BODY MASS INDEX: 22.15 KG/M2 | RESPIRATION RATE: 18 BRPM | TEMPERATURE: 97.3 F | WEIGHT: 112.8 LBS | HEART RATE: 72 BPM | DIASTOLIC BLOOD PRESSURE: 70 MMHG | OXYGEN SATURATION: 98 %

## 2021-03-02 DIAGNOSIS — Z79.899 ONGOING USE OF POSSIBLY TOXIC MEDICATION: ICD-10-CM

## 2021-03-02 DIAGNOSIS — E78.5 HYPERLIPIDEMIA, UNSPECIFIED HYPERLIPIDEMIA TYPE: ICD-10-CM

## 2021-03-02 DIAGNOSIS — M05.9 SEROPOSITIVE RHEUMATOID ARTHRITIS (HCC): Primary | ICD-10-CM

## 2021-03-02 PROCEDURE — 99215 OFFICE O/P EST HI 40 MIN: CPT | Performed by: INTERNAL MEDICINE

## 2021-03-02 NOTE — PROGRESS NOTES
Chief Complaint   Patient presents with    Arthritis     feet     1. Have you been to the ER, urgent care clinic since your last visit? Hospitalized since your last visit? No    2. Have you seen or consulted any other health care providers outside of the [address] [place] [address] since your last visit? Include any pap smears or colon screening.  No

## 2021-03-02 NOTE — PATIENT INSTRUCTIONS
1. Decrease methotrexate to 6 pills (15mg) once a week. In 4 weeks, if you're still doing well in terms of your joints, go ahead and decrease your dose to 5 pills (12.5mg) weekly. Continue folic acid and Rinvoq daily. 2. Labs ASAP and again in 3 months, before next visit. 3. Talk to your PCP about getting the shingles vaccine, since your current regimen increases your risk of this. 4. Return in 4-5 months.

## 2021-03-03 LAB
ALBUMIN SERPL-MCNC: 4.6 G/DL (ref 3.8–4.8)
ALBUMIN/GLOB SERPL: 1.9 {RATIO} (ref 1.2–2.2)
ALP SERPL-CCNC: 111 IU/L (ref 39–117)
ALT SERPL-CCNC: 12 IU/L (ref 0–32)
AST SERPL-CCNC: 15 IU/L (ref 0–40)
BASOPHILS # BLD AUTO: 0.1 X10E3/UL (ref 0–0.2)
BASOPHILS NFR BLD AUTO: 1 %
BILIRUB SERPL-MCNC: 0.5 MG/DL (ref 0–1.2)
BUN SERPL-MCNC: 22 MG/DL (ref 8–27)
BUN/CREAT SERPL: 27 (ref 12–28)
CALCIUM SERPL-MCNC: 9.2 MG/DL (ref 8.7–10.3)
CHLORIDE SERPL-SCNC: 102 MMOL/L (ref 96–106)
CO2 SERPL-SCNC: 24 MMOL/L (ref 20–29)
CREAT SERPL-MCNC: 0.83 MG/DL (ref 0.57–1)
CRP SERPL-MCNC: <1 MG/L (ref 0–10)
EOSINOPHIL # BLD AUTO: 0.1 X10E3/UL (ref 0–0.4)
EOSINOPHIL NFR BLD AUTO: 1 %
ERYTHROCYTE [DISTWIDTH] IN BLOOD BY AUTOMATED COUNT: 13 % (ref 11.7–15.4)
ERYTHROCYTE [SEDIMENTATION RATE] IN BLOOD BY WESTERGREN METHOD: 18 MM/HR (ref 0–40)
GLOBULIN SER CALC-MCNC: 2.4 G/DL (ref 1.5–4.5)
GLUCOSE SERPL-MCNC: 82 MG/DL (ref 65–99)
HCT VFR BLD AUTO: 39 % (ref 34–46.6)
HGB BLD-MCNC: 12.3 G/DL (ref 11.1–15.9)
IMM GRANULOCYTES # BLD AUTO: 0 X10E3/UL (ref 0–0.1)
IMM GRANULOCYTES NFR BLD AUTO: 0 %
LYMPHOCYTES # BLD AUTO: 2.1 X10E3/UL (ref 0.7–3.1)
LYMPHOCYTES NFR BLD AUTO: 31 %
MCH RBC QN AUTO: 29.1 PG (ref 26.6–33)
MCHC RBC AUTO-ENTMCNC: 31.5 G/DL (ref 31.5–35.7)
MCV RBC AUTO: 92 FL (ref 79–97)
MONOCYTES # BLD AUTO: 0.5 X10E3/UL (ref 0.1–0.9)
MONOCYTES NFR BLD AUTO: 7 %
NEUTROPHILS # BLD AUTO: 4 X10E3/UL (ref 1.4–7)
NEUTROPHILS NFR BLD AUTO: 60 %
PLATELET # BLD AUTO: 362 X10E3/UL (ref 150–450)
POTASSIUM SERPL-SCNC: 4.5 MMOL/L (ref 3.5–5.2)
PROT SERPL-MCNC: 7 G/DL (ref 6–8.5)
RBC # BLD AUTO: 4.22 X10E6/UL (ref 3.77–5.28)
SODIUM SERPL-SCNC: 140 MMOL/L (ref 134–144)
WBC # BLD AUTO: 6.7 X10E3/UL (ref 3.4–10.8)

## 2021-04-13 ENCOUNTER — TRANSCRIBE ORDER (OUTPATIENT)
Dept: SCHEDULING | Age: 66
End: 2021-04-13

## 2021-04-13 DIAGNOSIS — Z12.31 VISIT FOR SCREENING MAMMOGRAM: Primary | ICD-10-CM

## 2021-05-04 ENCOUNTER — HOSPITAL ENCOUNTER (OUTPATIENT)
Dept: MAMMOGRAPHY | Age: 66
Discharge: HOME | End: 2021-05-04
Attending: FAMILY MEDICINE
Payer: MEDICAID

## 2021-05-04 DIAGNOSIS — Z12.31 VISIT FOR SCREENING MAMMOGRAM: ICD-10-CM

## 2021-05-04 PROCEDURE — 77067 SCR MAMMO BI INCL CAD: CPT

## 2021-05-27 DIAGNOSIS — I10 ESSENTIAL HYPERTENSION, MALIGNANT: ICD-10-CM

## 2021-05-27 DIAGNOSIS — I10 HTN, GOAL BELOW 130/80: ICD-10-CM

## 2021-05-27 NOTE — TELEPHONE ENCOUNTER
Caller (if not patient): self     Relationship of caller (if not patient): n/a     Best contact number(s): [phone]      Name of medication and dosage if known:lisinopril-hydroCHLOROthiazide (PRINZIDE, ZESTORETIC) 20-25 mg per tablet, metoprolol tartrate (LOPRESSOR) 25 mg tablet                    Is patient out of this medication (yes/no): a few pills    90 or 30 day supply: 30     Pharmacy name: [facility]     Pharmacy listed in chart?(yes/no): n/a    Pharmacy phone number: n/a    Additional details to clarify the request: n/a

## 2021-05-28 RX ORDER — METOPROLOL TARTRATE 25 MG/1
TABLET, FILM COATED ORAL
Qty: 60 TABLET | Refills: 0 | Status: SHIPPED | OUTPATIENT
Start: 2021-05-28 | End: 2021-06-30

## 2021-05-28 RX ORDER — LISINOPRIL AND HYDROCHLOROTHIAZIDE 20; 25 MG/1; MG/1
1 TABLET ORAL DAILY
Qty: 30 TABLET | Refills: 0 | Status: SHIPPED | OUTPATIENT
Start: 2021-05-28 | End: 2021-08-11

## 2021-06-30 DIAGNOSIS — I10 HTN, GOAL BELOW 130/80: ICD-10-CM

## 2021-06-30 RX ORDER — METOPROLOL TARTRATE 25 MG/1
TABLET, FILM COATED ORAL
Qty: 60 TABLET | Refills: 0 | Status: SHIPPED | OUTPATIENT
Start: 2021-06-30 | End: 2021-10-25

## 2021-08-11 ENCOUNTER — OFFICE VISIT (OUTPATIENT)
Dept: FAMILY MEDICINE CLINIC | Age: 66
End: 2021-08-11
Payer: MEDICAID

## 2021-08-11 VITALS
BODY MASS INDEX: 22.64 KG/M2 | WEIGHT: 115.3 LBS | OXYGEN SATURATION: 98 % | HEART RATE: 78 BPM | DIASTOLIC BLOOD PRESSURE: 64 MMHG | SYSTOLIC BLOOD PRESSURE: 106 MMHG | RESPIRATION RATE: 16 BRPM | HEIGHT: 60 IN

## 2021-08-11 DIAGNOSIS — Z71.89 ADVICE GIVEN ABOUT COVID-19 VIRUS INFECTION: ICD-10-CM

## 2021-08-11 DIAGNOSIS — Z00.00 MEDICARE ANNUAL WELLNESS VISIT, SUBSEQUENT: Primary | ICD-10-CM

## 2021-08-11 DIAGNOSIS — E11.8 CONTROLLED DIABETES MELLITUS TYPE 2 WITH COMPLICATIONS, UNSPECIFIED WHETHER LONG TERM INSULIN USE (HCC): ICD-10-CM

## 2021-08-11 DIAGNOSIS — Z13.39 SCREENING FOR ALCOHOLISM: ICD-10-CM

## 2021-08-11 DIAGNOSIS — Z71.89 ADVANCED DIRECTIVES, COUNSELING/DISCUSSION: ICD-10-CM

## 2021-08-11 LAB — HBA1C MFR BLD HPLC: 6.8 %

## 2021-08-11 PROCEDURE — 83036 HEMOGLOBIN GLYCOSYLATED A1C: CPT | Performed by: FAMILY MEDICINE

## 2021-08-11 PROCEDURE — G0439 PPPS, SUBSEQ VISIT: HCPCS | Performed by: FAMILY MEDICINE

## 2021-08-11 NOTE — PROGRESS NOTES
This is the Subsequent Medicare Annual Wellness Exam, performed 12 months or more after the Initial AWV or the last Subsequent AWV    I have reviewed the patient's medical history in detail and updated the computerized patient record. Patient able to drive no recent accident, Patient compare self health the same to others with the same age,   patient with normal hearing normal vision able to do all ADL currently working full-time, having had no fall and no incontinence having normal appetite no weight loss since last seen eating fruits and vegetables every day does not do exercises denies any substance abuse,     Unfortunately patient has couple other problems and concerns which were not included in annual wellness exam visit,     Last pap was many yrs ago nl reading  Last mammogram was also normal [id number]  Last DEXA scan was abnormal in 2019, osteopenia   quit >15yrs ago,      Assessment/Plan   Education and counseling provided:  Are appropriate based on today's review and evaluation  End-of-Life planning (with patient's consent)  Influenza Vaccine  Screening Mammography  Bone mass measurement (DEXA)    1. Medicare annual wellness visit, subsequent  2. Controlled diabetes mellitus type 2 with complications, unspecified whether long term insulin use (HCC)  -     AMB POC HEMOGLOBIN A1C  -     REFERRAL TO PODIATRY  -     REFERRAL TO OPHTHALMOLOGY  3. Advice given about COVID-19 virus infection  4.  Advanced directives, counseling/discussion  -     ADVANCE CARE PLANNING FIRST 30 MINS  -     FULL CODE  5. Screening for alcoholism  -     DC ANNUAL ALCOHOL SCREEN 15 MIN       Depression Risk Factor Screening     3 most recent PHQ Screens 8/11/2021   Little interest or pleasure in doing things Not at all   Feeling down, depressed, irritable, or hopeless Not at all   Total Score PHQ 2 0       Alcohol Risk Screen    Do you average more than 1 drink per night or more than 7 drinks a week:  No    On any one occasion in the past three months have you have had more than 3 drinks containing alcohol:  No        Functional Ability and Level of Safety    Hearing: Hearing is good. Activities of Daily Living: The home contains: handrails, grab bars and rugs  Patient needs help with:  transportation      Ambulation: with no difficulty     Fall Risk:  Fall Risk Assessment, last 12 mths 8/11/2021   Able to walk? Yes   Fall in past 12 months? 0   Do you feel unsteady?  0   Are you worried about falling 0      Abuse Screen:  Patient is not abused       Cognitive Screening    Has your family/caregiver stated any concerns about your memory: no     Cognitive Screening: Normal - MMSE (Mini Mental Status Exam)    Health Maintenance Due     Health Maintenance Due   Topic Date Due    Eye Exam Retinal or Dilated  Never done    COVID-19 Vaccine (1) Never done    Shingrix Vaccine Age 50> (1 of 2) Never done    Foot Exam Q1  08/16/2017    MICROALBUMIN Q1  07/16/2020    Lipid Screen  02/21/2021       Patient Care Team   Patient Care Team:  Zachary Byrd MD as PCP - General (Family Medicine)  Zachary Byrd MD as PCP - REHABILITATION [facility] Empaneled Provider    History     Patient Active Problem List   Diagnosis Code    HTN, goal below 130/80 I10    Hemiparesis affecting left side as late effect of cerebrovascular accident ([address] [place]    Hypercholesteremia E78.00    S/P stroke due to cerebrovascular disease Z86.73    Prediabetes R73.03    Lipoma of back D17.1    Non compliance w medication regimen Z91.14    Diabetes mellitus type 2, controlled ([address] E11.9    Knee pain, bilateral M25.561, M25.562    Neuropathic pain of both feet G57.93    Type 2 diabetes mellitus with diabetic neuropathy ([address] E11.40    Stroke ([address] I63.9    Type II diabetes mellitus with nephropathy ([address] E11.21     Past Medical History:   Diagnosis Date    Arthritis     hands/all over    Diabetes mellitus ([address] 6/15/2015    Dyspepsia and other specified disorders of function of stomach     Foot pain, bilateral 6/15/2015    Hemiparesis affecting left side as late effect of cerebrovascular accident ([facility] 4/18/2014    Hypercholesteremia 4/18/2014    Hypertension     Knee pain, bilateral 6/15/2015    Lipoma of back 5/14/2015    Nausea & vomiting     Prediabetes 5/14/2015    S/P stroke due to cerebrovascular disease 4/18/2014    Screening for breast cancer 9/15/2015    [facility] mellitus [facility] nephropathy [facility] 7/23/2019      Past Surgical History:   Procedure Laterality Date    COLONOSCOPY N/A 10/17/2019    COLONOSCOPY performed by Rj Mueller MD at [address] HX OTHER SURGICAL      lipoma removed from back    HX [address] [facility]  2004    hernia repair     Current Outpatient Medications   Medication Sig Dispense Refill    metoprolol tartrate (LOPRESSOR) 25 mg tablet TAKE 1 TABLET BY MOUTH TWICE A DAY 60 Tablet 0    lisinopril-hydroCHLOROthiazide (PRINZIDE, ZESTORETIC) 20-25 mg per tablet TAKE 1 TAB BY MOUTH DAILY. PATIENT REQUIRES AN OFFICE VISIT FOR ADDITIONAL REFILLS. 90 Tab 1    methotrexate (RHEUMATREX) 2.5 mg tablet Take 6 Tabs by mouth Every Saturday. Labs ASAP, and every 3 months 90 Tab 0    simvastatin (ZOCOR) 10 mg tablet TAKE 1 TABLET BY MOUTH EVERY NIGHT PATIENT REQUIRES AN OFFICE VISIT FOR ADDITIONAL REFILLS. 90 Tab 1    cloNIDine HCL (CATAPRES) 0.2 mg tablet TAKE 1 TABLET BY MOUTH EVERY NIGHT 90 Tab 1    folic acid (FOLVITE) 1 mg tablet Take 1 Tab by mouth daily. 90 Tab 5    calcium carbonate-vitamin D3 (CALTRATE 600 PLUS D) 600 mg (1,500 mg)-800 unit chew Take 1 Tab by mouth daily. 90 Tab 3    aspirin 81 mg chewable tablet Take 1 Tab by mouth daily. 30 Tab 11    upadacitinib (Rinvoq) 15 mg Tb24 Take 15 mg by mouth daily. 90 UNSPECIFIED 3    metFORMIN (GLUCOPHAGE) 500 mg tablet Take 1 Tab by mouth daily (with breakfast).  (Patient not taking: Reported on 8/11/2021) 90 Tab 3 Allergies   Allergen Reactions    Codeine Nausea and Vomiting       Family History   Problem Relation Age of Onset    Hypertension Mother     Diabetes Mother    [facility] Arthritis-rheumatoid Father     Hypertension Brother     Stroke Brother      Social History     Tobacco Use    Smoking status: Former Smoker     Packs/day: 0.25     Years: 35.00     Pack years: 8.75     Quit date: 2009     Years since quittin.2    Smokeless tobacco: Never Used   Substance Use Topics    Alcohol use: No     Comment: ETOH abuse in past; quit          Katie Romero MD

## 2021-08-11 NOTE — PATIENT INSTRUCTIONS
Medicare Wellness Visit, Female     The best way to live healthy is to have a lifestyle where you eat a well-balanced diet, exercise regularly, limit alcohol use, and quit all forms of tobacco/nicotine, if applicable. Regular preventive services are another way to keep healthy. Preventive services (vaccines, screening tests, monitoring & exams) can help personalize your care plan, which helps you manage your own care. Screening tests can find health problems at the earliest stages, when they are easiest to treat. [facility] follows the current, evidence-based guidelines published by the [facility] ([facility]) when recommending preventive services for our patients. Because we follow these guidelines, sometimes recommendations change over time as research supports it. (For example, mammograms used to be recommended annually. Even though Medicare will still pay for an annual mammogram, the newer guidelines recommend a mammogram every two years for women of average risk). Of course, you and your doctor may decide to screen more often for some diseases, based on your risk and your co-morbidities (chronic disease you are already diagnosed with). Preventive services for you include:  - Medicare offers their members a free annual wellness visit, which is time for you and your primary care provider to discuss and plan for your preventive service needs. Take advantage of this benefit every year!  -All adults over the age of 72 should receive the recommended pneumonia vaccines. Current USPSTF guidelines recommend a series of two vaccines for the best pneumonia protection.   -All adults should have a flu vaccine yearly and a tetanus vaccine every 10 years.   -All adults age 48 and older should receive the shingles vaccines (series of two vaccines).       -All adults age 38-68 who are overweight should have a diabetes screening test once every three years.   -All adults born between [birth year] and 1963 should be screened once for Hepatitis C.  -Other screening tests and preventive services for persons with diabetes include: an eye exam to screen for diabetic retinopathy, a kidney function test, a foot exam, and stricter control over your cholesterol.   -Cardiovascular screening for adults with routine risk involves an electrocardiogram (ECG) at intervals determined by your doctor.   -Colorectal cancer screenings should be done for adults age 54-65 with no increased risk factors for colorectal cancer. There are a number of acceptable methods of screening for this type of cancer. Each test has its own benefits and drawbacks. Discuss with your doctor what is most appropriate for you during your annual wellness visit. The different tests include: colonoscopy (considered the best screening method), a fecal occult blood test, a fecal DNA test, and sigmoidoscopy.    -A bone mass density test is recommended when a woman turns 65 to screen for osteoporosis. This test is only recommended one time, as a screening. Some providers will use this same test as a disease monitoring tool if you already have osteoporosis. -Breast cancer screenings are recommended every other year for women of normal risk, age 54-69.  -Cervical cancer screenings for women over age 72 are only recommended with certain risk factors.      Here is a list of your current Health Maintenance items (your personalized list of preventive services) with a due date:  Health Maintenance Due   Topic Date Due    Eye Exam  Never done    COVID-19 Vaccine (1) Never done    Shingles Vaccine (1 of 2) Never done    Diabetic Foot Care  08/16/2017    Albumin Urine Test  07/16/2020    Cholesterol Test   02/21/2021

## 2021-08-11 NOTE — PROGRESS NOTES
Chief Complaint   Patient presents with   [facility] Annual Wellness Visit     1. Have you been to the ER, urgent care clinic since your last visit? Hospitalized since your last visit? No    2. Have you seen or consulted any other health care providers outside of the [address] [place] [address] since your last visit? Include any pap smears or colon screening. No     Verified patient with two type of identifiers.  denies c/o at present

## 2021-08-11 NOTE — ACP (ADVANCE CARE PLANNING)
Advance Care Planning     Advance Care Planning (ACP) Physician/NP/PA Conversation      Date of Conversation: 8/11/2021       Conversation Conducted with:   Patient with Decision Making Capacity     Other Legally Authorized Decision Maker would be tita Aranda as SDM     For My patients who has currently great Decision Making Capacity:     Patient is on presence of no family member,, stated that the pt wants to be not DNR at this time,  The pt likes to be a full code individual,  The patient states that there is a lot of will to live,  Pt was given the form,   will sign and bring us a copy on the later date.       Conversation Outcomes / Follow-Up Plan:   Completed new Advance Directive      Length of ACP Conversation in minutes:  16 minutes              Phylicia Renee MD

## 2021-08-26 ENCOUNTER — OFFICE VISIT (OUTPATIENT)
Dept: RHEUMATOLOGY | Age: 66
End: 2021-08-26
Payer: MEDICAID

## 2021-08-26 VITALS
OXYGEN SATURATION: 95 % | HEART RATE: 84 BPM | SYSTOLIC BLOOD PRESSURE: 92 MMHG | WEIGHT: 115 LBS | TEMPERATURE: 98.5 F | BODY MASS INDEX: 22.46 KG/M2 | RESPIRATION RATE: 16 BRPM | DIASTOLIC BLOOD PRESSURE: 65 MMHG

## 2021-08-26 DIAGNOSIS — Z79.899 ONGOING USE OF POSSIBLY TOXIC MEDICATION: ICD-10-CM

## 2021-08-26 DIAGNOSIS — M05.9 SEROPOSITIVE RHEUMATOID ARTHRITIS (HCC): Primary | ICD-10-CM

## 2021-08-26 DIAGNOSIS — M65.332 TRIGGER FINGER, LEFT MIDDLE FINGER: ICD-10-CM

## 2021-08-26 PROCEDURE — 99215 OFFICE O/P EST HI 40 MIN: CPT | Performed by: INTERNAL MEDICINE

## 2021-08-26 RX ORDER — LEFLUNOMIDE 10 MG/1
10 TABLET ORAL DAILY
Qty: 30 TABLET | Refills: 2 | Status: SHIPPED | OUTPATIENT
Start: 2021-08-26 | End: 2022-04-22

## 2021-08-26 NOTE — LETTER
8/26/2021    Patient: Saeid العلي   YOB: 1955   Date of Visit: 8/26/2021     Ced Sheridan MD  [facility] [address]  [facility]    Dear Ced Sheridan MD,      We recently saw Ms. Mando Meza in the [facility] for evaluation. My notes for this consultation are attached. If you have questions, please do not hesitate to call me. I look forward to following your patient along with you.       Sincerely,    Jimenez Arguello MD [facility]  Cell: [phone]

## 2021-08-26 NOTE — PATIENT INSTRUCTIONS
1. It looks like you have some early inflammation in the wrists and knuckles, I'm starting you on a low dose of Arava (leflunomide) to help suppress this and prevent a full-on flare in the coming months. 2. Start leflunomide 1 pill (10mg) daily with breakfast. Let me know if you have any problems with new numbness/tingling, diarrhea, or blood pressure issues. 3. Xrays of hands, any [facility] location would be OK, such as those associated with the [facility] ([address], [place] [address]). . you don't need an appointment. 4. Repeat labs in 1 month. 5. I'm providing you a referral to hand orthopedics in case you'd like a shot for the finger triggering. You'll need to call [phone] to schedule. 5. Return in 6 weeks.

## 2021-08-26 NOTE — PROGRESS NOTES
Maria Luz Farmer is a 61 y.o. female who was seen in-office on 8/26/2021 for followup. HISTORY OF DISEASE: Seropositive erosive rheumatoid arthritis    Year of diagnosis: 2019  First visit with this clinic: 10/2019  Cumulative disease manifestations: erosions  Positive serologies/labs: RF, CCP  Negative serologies/labs: Other co-morbidities: CVA with residual right sided hemiparesis, DM with neuropathy, HTN  Relapses:      Past treatment history:  Prednisone: prednisone 5 mg oral daily weaned off in early 2020. Non-biologic DMARD: Methotrexate 20 mg oral weekly (11/2019-->increased to 20 mg oral weekly in 2/2020 pt dc'd 7/21)  Biologic: Rinvoq 12/2019-6/2021  Other:    HISTORY OF PRESENT ILLNESS     Previous medical records reviewed and summarized: yes    Kendall Rivas was making her dizzy and felt methotrexate was contributing     Off of Rinvoq now for about the last 2 months, methotrexate for about a month. Left middle finger has been triggering every morning. For pain takes OTC Goody powders or tylenol, not consistently. Next week has a podiatry appointment for a sore callus on the right foot. Estimates 10-15min morning stiffness currently.       REVIEW OF SYSTEMS    Positives as per history  Negatives as follows:  Lizzette Mohan:    Denies unexplained persistent fevers, weight change, chronic fatigue  HEAD/EYES:              Denies eye redness, blurry vision or sudden loss of vision, dry eyes, HA, temporal artery pain  ENT:                            Denies oral/nasal ulcers, recurrent sinus infections, dry mouth  RESPIRATORY:         No pleuritic pain, history of pleural effusions, hemoptysis, exertional dyspnea  CARDIOVASCULAR:             Denies chest pain, history of pericardial effusions  GASTRO:                    Denies heartburn, esophageal dysmotility, abdominal pain, nausea, vomiting, diarrhea, blood in the stool  HEMATOLOGIC:        No easy bruising, purpura, swollen lymph nodes  SKIN:                           Denies alopecia, ulcers, nodules, sun sensitivity, unexplained persistent rash   VASCULAR:                Denies edema, cyanosis, raynaud phenomenon  NEUROLOGIC:           Denies specific muscle weakness, paresthesias   PSYCHIATRIC:           No sleep disturbance / snoring, depression, anxiety  MSK:                           No morning stiffness >1 hour, SI joint pain, persistent joint swelling, persistent joint pain    PAST MEDICAL HISTORY    Past Medical History:   Diagnosis Date    Arthritis     hands/all over    Diabetes mellitus ([facility] [address] [address] 6/15/2015    Dyspepsia and other specified disorders of function of stomach     Foot pain, bilateral 6/15/2015    Hemiparesis affecting left side as late effect of cerebrovascular accident ([address] 2014    Hypercholesteremia 2014    Hypertension     Knee pain, bilateral 6/15/2015    Lipoma of back 2015    Nausea & vomiting     Prediabetes 2015    S/P stroke due to cerebrovascular disease 2014    Screening for breast cancer 9/15/2015    Type II diabetes mellitus with nephropathy ([facility] [address] 2019        Past Surgical History:   Procedure Laterality Date    COLONOSCOPY N/A 10/17/2019    COL[facility] by Alesha Cowan MD at [facility] END[facility]COPY    HX OTHER SURGICAL      lipoma removed from back    HX TUBAL LIGATION  1984    MD ABDOMEN SURGERY [address] UNLISTED  2004    hernia repair       FAMILY HISTORY    Family History   Problem Relation Age of Onset    Hypertension Mother     Diabetes Mother     Arthritis-rheumatoid Father     Hypertension Brother     Stroke Brother        SOCIAL HISTORY    Social History     Tobacco Use    Smoking status: Former Smoker     Packs/day: 0.25     Years: 35.00     Pack years: 8.75     Quit date: 2009     Years since quittin.2    Smokeless tobacco: Never Used   Vaping Use    Vaping Use: Never used   Substance Use Topics    Alcohol use:  No Comment: ETOH abuse in past; quit 2012    Drug use: No       MEDICATIONS    Current Outpatient Medications   Medication Sig Dispense Refill    metoprolol tartrate (LOPRESSOR) 25 mg tablet TAKE 1 TABLET BY MOUTH TWICE A DAY 60 Tablet 0    lisinopril-hydroCHLOROthiazide (PRINZIDE, ZESTORETIC) 20-25 mg per tablet TAKE 1 TAB BY MOUTH DAILY. PATIENT REQUIRES AN OFFICE VISIT FOR ADDITIONAL REFILLS. 90 Tab 1    simvastatin (ZOCOR) 10 mg tablet TAKE 1 TABLET BY MOUTH EVERY NIGHT PATIENT REQUIRES AN OFFICE VISIT FOR ADDITIONAL REFILLS. 90 Tab 1    cloNIDine HCL (CATAPRES) 0.2 mg tablet TAKE 1 TABLET BY MOUTH EVERY NIGHT 90 Tab 1    folic acid (FOLVITE) 1 mg tablet Take 1 Tab by mouth daily. 90 Tab 5    calcium carbonate-vitamin D3 (CALTRATE 600 PLUS D) 600 mg (1,500 mg)-800 unit chew Take 1 Tab by mouth daily. 90 Tab 3    aspirin 81 mg chewable tablet Take 1 Tab by mouth daily. 30 Tab 11    upadacitinib (Rinvoq) 15 mg Tb24 Take 15 mg by mouth daily. (Patient not taking: Reported on 8/26/2021) 90 UNSPECIFIED 3    methotrexate (RHEUMATREX) 2.5 mg tablet Take 6 Tabs by mouth Every Saturday. Labs ASAP, and every 3 months (Patient not taking: Reported on 8/26/2021) 90 Tab 0    metFORMIN (GLUCOPHAGE) 500 mg tablet Take 1 Tab by mouth daily (with breakfast). (Patient not taking: Reported on 8/11/2021) 90 Tab 3       ALLERGIES    Allergies   Allergen Reactions    Codeine Nausea and Vomiting       PHYSICAL EXAMINATION  Visit Vitals  BP 92/65 (BP 1 Location: Left upper arm, BP Patient Position: Sitting, BP Cuff Size: Adult)   Pulse 84   Temp 98.5 °F (36.9 °C) (Oral)   Resp 16   Wt 115 lb (52.2 kg)   LMP  (LMP Unknown)   SpO2 95%   BMI 22.46 kg/m²         General:  The patient is well developed, well nourished, alert, and in no apparent distress. Eyes: Sclera are anicteric. No conjunctival injection. HEENT:  Oropharynx is clear. No oral ulcers. Adequate salivary pooling. No cervical or supraclavicular lymphadenopathy. Lungs:  Clear to auscultation bilaterally, without wheeze or stridor. Normal respiratory effort. Cor:  Regular rate and rhythm. No murmur rub or gallop. Abdomen: Soft, non-tender, without hepatomegaly or masses. Extremities: No calf tenderness or edema. Warm and well perfused. Skin:  No significant abnormalities. No nodules. No petechial, purpuric, or psoriaform rashes   Neuro: Nonfocal, no foot or wrist drop. Musculoskeletal:    A comprehensive musculoskeletal exam was performed for all joints of each upper and lower extremity and assessed for swelling, tenderness and range of motion. Results are documented as below:  No evidence of synovitis in the small joints of the hands, wrists, shoulders, elbows, hips, knees or ankles. Elbows stably limited from full extension by 5deg  Bilateral wrist near-fusion, <5deg flexion/extension, tenderness in right > left wrist.  Right MCP3-5 fixed flexion deformities to 90deg, tenderness in bilateral MCPs  Interval development of left 3rd finger triggering. Bilateral ankle swelling without warmth or tenderness  Bilateral hammertoes left > right, no MTP squeeze tenderness        DATA REVIEW    Prior medical records were reviewed and if applicable are summarized as below:    Labs:   3/2/21: CBC WNL, ESR 18, Cr 0.83, LFTs WNL, CRP <1mg/L  2/2020: Cbc, cmp unremarkable, lipids normal  12/2019: Cbc, cmp unremarkable  10/2019: Quant gold, HBV, HCV negative, RF, CCP positive, ESR 61, CRP, CMP normal  7/2019: cbc, CMP, TSH normal    Imaging:   Hand x-rays (10/2019): Personally reviewed images. + severe erosive changes  Foot x-rays (10/2019): Personally reviewed images. + erosive and DJD changes    ASSESSMENT AND PLAN    A 61 y.o. female with hx of CVA with right sided weakness, DM, seropositive erosive rheumatoid arthritis who has recently self-discontinued her immunosuppression presenting with an early flare of synovitis in hands and wrists.  Reviewed alternatives, she is agreeable to starting low-dose leflunomide to help head off further progression of flare, referring to orthopedics for left trigger finger. # Seropositive erosive rheumatoid arthritis:  - Start leflunomide 10mg daily  - Reordered new baseline plain films    # Adhesive capsulitis of right shoulder:  - right weakness most likely contributing  - still with pain  - Not interested in steroid injection or PT referral    # Medication Toxicity Monitoring:  - cbc, cmp reordered for 1mo toxicity screening on leflunomide  - Hepatitis B, C: negative 10/2019  - Quant gold: negative 10/2019  - Immunizations: We discussed receiving Shingrix, COVID, influenza, Prevar-13, and Pneumovax-23 vaccines as per the CDC guidelines for immunosuppressed patients. - bone health: caltrate prescribed     Patient Instructions   1. It looks like you have some early inflammation in the wrists and knuckles, I'm starting you on a low dose of Arava (leflunomide) to help suppress this and prevent a full-on flare in the coming months. 2. Start leflunomide 1 pill (10mg) daily with breakfast. Let me know if you have any problems with new numbness/tingling, diarrhea, or blood pressure issues. 3. Xrays of hands, any [facility] location would be OK, such as those associated with the hospitals ([facility] [address], [place] [address]). . you don't need an appointment. 4. Repeat labs in 1 month. 5. I'm providing you a referral to hand orthopedics in case you'd like a shot for the finger triggering. You'll need to call [phone] to schedule. 5. Return in 6 weeks. The patient voiced understanding of the aforementioned assessment and plan. Summary of plan was provided in the After Visit Summary patient instructions. I also provided education about MyChart setup and utility. Ms. Balaji Chung has a reminder for a \"due or due soon\" health maintenance.  I have asked that she contact her primary care provider for follow-up on this health maintenance. TODAY'S ORDERS    Orders Placed This Encounter    XR HAND RT MIN 3 V    XR HAND LT MIN 3 V    C REACTIVE PROTEIN, QT    CBC WITH AUTOMATED DIFF    METABOLIC PANEL, COMPREHENSIVE    SED RATE (ESR)    [facility]    leflunomide (ARAVA) 10 mg tablet       Future Appointments   Date Time Provider Kaur Herzog   2/11/2022  9:00 AM Patsie Councilman, MD [facility] BS [facility]     We discussed the expected course, resolution and complications of the diagnosis(es) in detail. Medication risks, benefits, costs, interactions, and alternatives were discussed as indicated. I advised her to contact the office if her condition worsens, changes or fails to improve as anticipated. She expressed understanding with the diagnosis(es) and plan.      Face to face time: 25 minutes  Note preparation and records review day of service: 20minutes  Total provider time day of service: 45 minutes      Roverto Fortune MD    Adult Rheumatology   [address]  [facility], [address] [facility]   Phone [phone]  Fax [phone]

## 2021-08-26 NOTE — PROGRESS NOTES
Chief Complaint   Patient presents with    Joint Pain     1. Have you been to the ER, urgent care clinic since your last visit? Hospitalized since your last visit? No    2. Have you seen or consulted any other health care providers outside of the [address] [place] [address] since your last visit? Include any pap smears or colon screening.  No

## 2021-09-02 ENCOUNTER — TELEPHONE (OUTPATIENT)
Dept: RHEUMATOLOGY | Age: 66
End: 2021-09-02

## 2021-09-02 NOTE — TELEPHONE ENCOUNTER
Received note from [facility] that leflunomide was not on her insurance formulary. If it is expensive, please encourage her to check with [facility], [facility], or [facility] where it is $25/month using a GoodRx coupon:  [email or web address]/leflunomide    I can send a new prescription to any of these pharmacies, or [facility] could transfer it, if $25/month is affordable for her.

## 2021-09-03 DIAGNOSIS — Z86.73 S/P STROKE DUE TO CEREBROVASCULAR DISEASE: ICD-10-CM

## 2021-09-03 DIAGNOSIS — I69.354 HEMIPARESIS AFFECTING LEFT SIDE AS LATE EFFECT OF CEREBROVASCULAR ACCIDENT (HCC): ICD-10-CM

## 2021-09-03 DIAGNOSIS — E78.00 HYPERCHOLESTEREMIA: ICD-10-CM

## 2021-09-03 RX ORDER — CLONIDINE HYDROCHLORIDE 0.2 MG/1
TABLET ORAL
Qty: 90 TABLET | Refills: 1 | Status: SHIPPED | OUTPATIENT
Start: 2021-09-03 | End: 2022-02-04

## 2021-09-03 RX ORDER — SIMVASTATIN 10 MG/1
TABLET, FILM COATED ORAL
Qty: 90 TABLET | Refills: 1 | Status: SHIPPED | OUTPATIENT
Start: 2021-09-03 | End: 2022-02-04

## 2021-10-24 DIAGNOSIS — I10 ESSENTIAL HYPERTENSION, MALIGNANT: ICD-10-CM

## 2021-10-24 DIAGNOSIS — I10 HTN, GOAL BELOW 130/80: ICD-10-CM

## 2021-10-25 RX ORDER — LISINOPRIL AND HYDROCHLOROTHIAZIDE 20; 25 MG/1; MG/1
TABLET ORAL
Qty: 30 TABLET | Refills: 1 | Status: SHIPPED | OUTPATIENT
Start: 2021-10-25 | End: 2022-02-04

## 2021-10-25 RX ORDER — METOPROLOL TARTRATE 25 MG/1
TABLET, FILM COATED ORAL
Qty: 60 TABLET | Refills: 0 | Status: SHIPPED | OUTPATIENT
Start: 2021-10-25 | End: 2022-01-19

## 2021-11-03 ENCOUNTER — HOSPITAL ENCOUNTER (OUTPATIENT)
Dept: GENERAL RADIOLOGY | Age: 66
Discharge: HOME | End: 2021-11-03
Payer: MEDICAID

## 2021-11-03 DIAGNOSIS — M05.9 SEROPOSITIVE RHEUMATOID ARTHRITIS (HCC): ICD-10-CM

## 2021-11-03 PROCEDURE — 73130 X-RAY EXAM OF HAND: CPT

## 2021-11-04 LAB
ALBUMIN SERPL-MCNC: 4.1 G/DL (ref 3.8–4.8)
ALBUMIN/GLOB SERPL: 1.3 {RATIO} (ref 1.2–2.2)
ALP SERPL-CCNC: 122 IU/L (ref 44–121)
ALT SERPL-CCNC: 10 IU/L (ref 0–32)
AST SERPL-CCNC: 14 IU/L (ref 0–40)
BASOPHILS # BLD AUTO: 0 X10E3/UL (ref 0–0.2)
BASOPHILS NFR BLD AUTO: 1 %
BILIRUB SERPL-MCNC: 0.4 MG/DL (ref 0–1.2)
BUN SERPL-MCNC: 23 MG/DL (ref 8–27)
BUN/CREAT SERPL: 26 (ref 12–28)
CALCIUM SERPL-MCNC: 9.3 MG/DL (ref 8.7–10.3)
CHLORIDE SERPL-SCNC: 101 MMOL/L (ref 96–106)
CHOLEST SERPL-MCNC: 168 MG/DL (ref 100–199)
CO2 SERPL-SCNC: 23 MMOL/L (ref 20–29)
CREAT SERPL-MCNC: 0.89 MG/DL (ref 0.57–1)
CRP SERPL-MCNC: 3 MG/L (ref 0–10)
EOSINOPHIL # BLD AUTO: 0.1 X10E3/UL (ref 0–0.4)
EOSINOPHIL NFR BLD AUTO: 1 %
ERYTHROCYTE [DISTWIDTH] IN BLOOD BY AUTOMATED COUNT: 13.1 % (ref 11.7–15.4)
ERYTHROCYTE [SEDIMENTATION RATE] IN BLOOD BY WESTERGREN METHOD: 56 MM/HR (ref 0–40)
GLOBULIN SER CALC-MCNC: 3.1 G/DL (ref 1.5–4.5)
GLUCOSE SERPL-MCNC: 107 MG/DL (ref 65–99)
HCT VFR BLD AUTO: 35.2 % (ref 34–46.6)
HDLC SERPL-MCNC: 56 MG/DL
HGB BLD-MCNC: 11.5 G/DL (ref 11.1–15.9)
IMM GRANULOCYTES # BLD AUTO: 0 X10E3/UL (ref 0–0.1)
IMM GRANULOCYTES NFR BLD AUTO: 0 %
LDLC SERPL CALC-MCNC: 96 MG/DL (ref 0–99)
LYMPHOCYTES # BLD AUTO: 1.8 X10E3/UL (ref 0.7–3.1)
LYMPHOCYTES NFR BLD AUTO: 27 %
MCH RBC QN AUTO: 29.6 PG (ref 26.6–33)
MCHC RBC AUTO-ENTMCNC: 32.7 G/DL (ref 31.5–35.7)
MCV RBC AUTO: 91 FL (ref 79–97)
MONOCYTES # BLD AUTO: 0.5 X10E3/UL (ref 0.1–0.9)
MONOCYTES NFR BLD AUTO: 7 %
NEUTROPHILS # BLD AUTO: 4.3 X10E3/UL (ref 1.4–7)
NEUTROPHILS NFR BLD AUTO: 64 %
PLATELET # BLD AUTO: 417 X10E3/UL (ref 150–450)
POTASSIUM SERPL-SCNC: 4.4 MMOL/L (ref 3.5–5.2)
PROT SERPL-MCNC: 7.2 G/DL (ref 6–8.5)
RBC # BLD AUTO: 3.89 X10E6/UL (ref 3.77–5.28)
SODIUM SERPL-SCNC: 138 MMOL/L (ref 134–144)
TRIGL SERPL-MCNC: 83 MG/DL (ref 0–149)
VLDLC SERPL CALC-MCNC: 16 MG/DL (ref 5–40)
WBC # BLD AUTO: 6.8 X10E3/UL (ref 3.4–10.8)

## 2021-11-18 ENCOUNTER — OFFICE VISIT (OUTPATIENT)
Dept: RHEUMATOLOGY | Age: 66
End: 2021-11-18
Payer: MEDICAID

## 2021-11-18 VITALS
BODY MASS INDEX: 21.55 KG/M2 | HEIGHT: 60 IN | HEART RATE: 66 BPM | OXYGEN SATURATION: 97 % | WEIGHT: 109.8 LBS | SYSTOLIC BLOOD PRESSURE: 96 MMHG | DIASTOLIC BLOOD PRESSURE: 64 MMHG | TEMPERATURE: 98.2 F | RESPIRATION RATE: 16 BRPM

## 2021-11-18 DIAGNOSIS — R74.8 ELEVATED ALKALINE PHOSPHATASE LEVEL: Primary | ICD-10-CM

## 2021-11-18 DIAGNOSIS — M05.9 SEROPOSITIVE RHEUMATOID ARTHRITIS (HCC): ICD-10-CM

## 2021-11-18 DIAGNOSIS — Z79.899 ONGOING USE OF POSSIBLY TOXIC MEDICATION: ICD-10-CM

## 2021-11-18 PROCEDURE — 99215 OFFICE O/P EST HI 40 MIN: CPT | Performed by: INTERNAL MEDICINE

## 2021-11-18 NOTE — PROGRESS NOTES
REASON FOR VISIT    Torsten Zuniga is a 61 y.o. female who was seen in-office on 11/18/2021 for followup. HISTORY OF DISEASE: Seropositive erosive rheumatoid arthritis    Year of diagnosis: 2019  First visit with this clinic: 10/2019  Cumulative disease manifestations: erosions  Positive serologies/labs: RF, CCP  Negative serologies/labs: Other co-morbidities: CVA with residual right sided hemiparesis, DM with neuropathy, HTN  Relapses:      Past treatment history:  Prednisone: prednisone 5 mg oral daily weaned off in early 2020. Non-biologic DMARD: Methotrexate 20 mg oral weekly (11/2019-->increased to 20 mg oral weekly in 2/2020 pt dc'd 7/21)  Biologic: Rinvoq 12/2019-6/2021  Other:    HISTORY OF PRESENT ILLNESS     Previous medical records reviewed and summarized: yes    AlkPhos returned elevated, has been uptrending. Having more hand and right > left midfoot and ankle pains. Never started leflunomide as was too expensive at the pharmacy. Wasn't aware of GoodRx. Says $22/month would be affordable. When hurting, will take a Goody powder with relief. No GI upset with this. No interval infections or rashes. 2019 DXA had shown osteopenia.  Patient not taking calcium or vitamin D      REVIEW OF SYSTEMS    Positives as per history  Negatives as follows:  Deana Foreman:    Denies unexplained persistent fevers, weight change, chronic fatigue  HEAD/EYES:              Denies eye redness, blurry vision or sudden loss of vision, dry eyes, HA, temporal artery pain  ENT:                            Denies oral/nasal ulcers, recurrent sinus infections, dry mouth  RESPIRATORY:         No pleuritic pain, history of pleural effusions, hemoptysis, exertional dyspnea  CARDIOVASCULAR:             Denies chest pain, history of pericardial effusions  GASTRO:                    Denies heartburn, esophageal dysmotility, abdominal pain, nausea, vomiting, diarrhea, blood in the stool  HEMATOLOGIC:        No easy bruising, purpura, swollen lymph nodes  SKIN:                           Denies alopecia, ulcers, nodules, sun sensitivity, unexplained persistent rash   VASCULAR:                Denies edema, cyanosis, raynaud phenomenon  NEUROLOGIC:           Denies specific muscle weakness, paresthesias   PSYCHIATRIC:           No sleep disturbance / snoring, depression, anxiety  MSK:                           No morning stiffness >1 hour, SI joint pain, persistent joint swelling, persistent joint pain    PAST MEDICAL HISTORY    Past Medical History:   Diagnosis Date    Arthritis     hands/all over    Diabetes mellitus ([address] 6/15/2015    Dyspepsia and other specified disorders of function of stomach     Foot pain, bilateral 6/15/2015    Hemiparesis affecting left side as late effect of cerebrovascular accident ([address] 2014    Hypercholesteremia 2014    Hypertension     Knee pain, bilateral 6/15/2015    Lipoma of back 2015    Nausea & vomiting     Prediabetes 2015    S/P stroke due to cerebrovascular disease 2014    Screening for breast cancer 9/15/2015    Type II diabetes mellitus with nephropathy ([address] 2019        Past Surgical History:   Procedure Laterality Date    COLONOSCOPY N/A 10/17/2019    COLONOSCOPY performed by Armond Lefort, [facility] at [facility] ENDOSCOPY    HX OTHER SURGICAL      lipoma removed from back    HX TUBAL LIGATION  1984    NM ABDOMEN SURGERY [address] UNLISTED  2004    hernia repair       FAMILY HISTORY    Family History   Problem Relation Age of Onset    Hypertension Mother     Diabetes Mother     Arthritis-rheumatoid Father     Hypertension Brother     Stroke Brother        SOCIAL HISTORY    Social History     Tobacco Use    Smoking status: Former Smoker     Packs/day: 0.25     Years: 35.00     Pack years: 8.75     Quit date: 2009     Years since quittin.4    Smokeless tobacco: Never Used   Vaping Use    Vaping Use: Never used   Substance Use Topics    Alcohol use: No     Comment: ETOH abuse in past; quit 2012    Drug use: No       MEDICATIONS    Current Outpatient Medications   Medication Sig Dispense Refill    metoprolol tartrate (LOPRESSOR) 25 mg tablet TAKE 1 TABLET BY MOUTH TWICE A DAY 60 Tablet 0    lisinopril-hydroCHLOROthiazide (PRINZIDE, ZESTORETIC) 20-25 mg per tablet TAKE 1 TABLET BY MOUTH DAILY. PATIENT REQUIRES AN OFFICE VISIT FOR ADDITIONAL REFILLS. 30 Tablet 1    cloNIDine HCL (CATAPRES) 0.2 mg tablet TAKE 1 TABLET BY MOUTH EVERY DAY AT NIGHT 90 Tablet 1    simvastatin (ZOCOR) 10 mg tablet TAKE 1 TABLET BY MOUTH EVERY NIGHT PATIENT REQUIRES AN OFFICE VISIT FOR ADDITIONAL REFILLS. 90 Tablet 1    leflunomide (ARAVA) 10 mg tablet Take 1 Tablet by mouth daily. 30 Tablet 2    folic acid (FOLVITE) 1 mg tablet Take 1 Tab by mouth daily. 90 Tab 5    calcium carbonate-vitamin D3 (CALTRATE 600 PLUS D) 600 mg (1,500 mg)-800 unit chew Take 1 Tab by mouth daily. 90 Tab 3    aspirin 81 mg chewable tablet Take 1 Tab by mouth daily. 30 Tab 11    metFORMIN (GLUCOPHAGE) 500 mg tablet Take 1 Tab by mouth daily (with breakfast). (Patient not taking: Reported on 8/11/2021) 90 Tab 3       ALLERGIES    Allergies   Allergen Reactions    Codeine Nausea and Vomiting       PHYSICAL EXAMINATION  Visit Vitals  BP 96/64 (BP 1 Location: Left upper arm, BP Patient Position: Sitting)   Pulse 66   Temp 98.2 °F (36.8 °C) (Oral)   Resp 16   Ht 5' (1.524 m)   Wt 109 lb 12.8 oz (49.8 kg)   LMP  (LMP Unknown)   SpO2 97%   BMI 21.44 kg/m²     General:  The patient is well developed, well nourished, alert, and in no apparent distress. Eyes: Sclera are anicteric. No conjunctival injection. HEENT:  Oropharynx is clear. No oral ulcers. Adequate salivary pooling. No cervical or supraclavicular lymphadenopathy. Lungs:  Clear to auscultation bilaterally, without wheeze or stridor. Normal respiratory effort. Cor:  Regular rate and rhythm. No murmur rub or gallop.    Abdomen: Soft, non-tender, without hepatomegaly or masses. Extremities: No calf tenderness or edema. Warm and well perfused. Skin:  No significant abnormalities. No nodules. No petechial, purpuric, or psoriaform rashes   Neuro: Nonfocal, no foot or wrist drop. Musculoskeletal:    A comprehensive musculoskeletal exam was performed for all joints of each upper and lower extremity and assessed for swelling, tenderness and range of motion. Results are documented as below:  No evidence of synovitis in the small joints of the hands, wrists, shoulders, elbows, hips, knees or ankles. Elbows stably limited from full extension by 5deg  Bilateral wrist near-fusion, <5deg flexion/extension, persistent tenderness in right > left wrist with trace synovitis. Right MCP3-5 fixed flexion deformities to 90deg, tenderness in bilateral MCPs still without synovitis. Left 3rd finger triggering. Right ankle synovitis today. Bilateral hammertoes left > right, no MTP squeeze tenderness        DATA REVIEW    Prior medical records were reviewed and if applicable are summarized as below:    Labs:   11/3/21: CBC WNL, ESR 56; Cr 0.89, Tbili 0.4, AST 14, ALT 10, AlkP 122; CRP 3mg/L  3/2/21: CBC WNL, ESR 18, Cr 0.83, LFTs WNL, CRP <1mg/L  2/2020: Cbc, cmp unremarkable, lipids normal  12/2019: Cbc, cmp unremarkable  10/2019: Quant gold, HBV, HCV negative, RF, CCP positive, ESR 61, CRP, CMP normal  7/2019: cbc, CMP, TSH normal  5/14/15 Vit D 9.3    Imaging:   Hand xrays (11/3/21): On my review, extensive erosions and collapse of wrists and carpals. Evidence of mild osteoarthritic/osteopenic change involving the bones of the hand. Evidence of marked degenerative change/fusion of the carpal bones. Marked narrowing of the radiocarpal joint. Irregularity of the distal ulna. Hand x-rays (10/2019): Personally reviewed images. + severe erosive changes  Foot x-rays (10/2019): Personally reviewed images.  + erosive and DJD changes    ASSESSMENT AND PLAN    A 61 y.o. female with hx of CVA with right sided weakness, DM, seropositive erosive rheumatoid arthritis who has recently self-discontinued her immunosuppression presenting with an early flare of synovitis in hands and wrists. Agreeable to trying leflunomide with more affordable dosing through GoodRx. .    # Seropositive erosive rheumatoid arthritis:  - Start leflunomide 10mg daily                                                                                                                          # Adhesive capsulitis of right shoulder:  - right weakness most likely contributing  - Not interested in steroid injection or PT referral    # Medication Toxicity Monitoring:  - cbc, cmp reordered for 1mo toxicity screening on leflunomide  - Hepatitis B, C: negative 10/2019  - Quant gold: negative 10/2019  - Immunizations: We discussed receiving Shingrix, COVID, influenza, Prevar-13, and Pneumovax-23 vaccines as per the CDC guidelines for immunosuppressed patients. - bone health: caltrate prescribed     Patient Instructions   1. I'm reordering leflunomide to start one pill daily. Use the Good Rx coupon to get this for ~$23/month, let me know if you have any further problems with this. 2. Repeat labs 1 month after starting leflunomide. 3. Next visit we'll reorder your bone density scan to make sure you don't have osteoporosis. 4. Return in 6 weeks.       TODAY'S ORDERS    Orders Placed This Encounter    VITAMIN D, 25 HYDROXY    C REACTIVE PROTEIN, QT    CBC WITH AUTOMATED DIFF    METABOLIC PANEL, COMPREHENSIVE    SED RATE (ESR)    GGT    ALKALINE PHOSPHATASE, BONE       Future Appointments   Date Time Provider Kaur Herzog   12/30/2021 11:40 AM Sima Velázquez MD [facility] BS AMB   2/11/2022  9:00 AM Christina Black MD [facility] BS AMB                                                                                                                                                              Face to face time: 25 minutes  Note preparation and records review day of service: 20minutes  Total provider time day of service: 45 minutes      Neil Mcdonald MD    Adult Rheumatology   [address]  [facility], [address] [place] [address], [address] [place] [address]   Phone [phone]  Fax [phone]

## 2021-11-18 NOTE — PATIENT INSTRUCTIONS
1. I'm reordering leflunomide to start one pill daily. Use the Good Rx coupon to get this for ~$23/month, let me know if you have any further problems with this. 2. Repeat labs 1 month after starting leflunomide. 3. Next visit we'll reorder your bone density scan to make sure you don't have osteoporosis. 4. Return in 6 weeks.

## 2021-11-18 NOTE — PROGRESS NOTES
Chief Complaint   Patient presents with    Arthritis     foot     1. Have you been to the ER, urgent care clinic since your last visit? Hospitalized since your last visit? No    2. Have you seen or consulted any other health care providers outside of the [address] [place] [address] since your last visit? Include any pap smears or colon screening.  No

## 2021-12-29 ENCOUNTER — TELEPHONE (OUTPATIENT)
Dept: RHEUMATOLOGY | Age: 66
End: 2021-12-29

## 2022-01-17 DIAGNOSIS — I10 HTN, GOAL BELOW 130/80: ICD-10-CM

## 2022-01-19 RX ORDER — METOPROLOL TARTRATE 25 MG/1
TABLET, FILM COATED ORAL
Qty: 60 TABLET | Refills: 0 | Status: SHIPPED | OUTPATIENT
Start: 2022-01-19 | End: 2022-02-04

## 2022-02-03 DIAGNOSIS — I10 HTN, GOAL BELOW 130/80: ICD-10-CM

## 2022-02-03 DIAGNOSIS — I10 ESSENTIAL HYPERTENSION, MALIGNANT: ICD-10-CM

## 2022-02-03 DIAGNOSIS — I69.354 HEMIPARESIS AFFECTING LEFT SIDE AS LATE EFFECT OF CEREBROVASCULAR ACCIDENT (HCC): ICD-10-CM

## 2022-02-03 DIAGNOSIS — E78.00 HYPERCHOLESTEREMIA: ICD-10-CM

## 2022-02-03 DIAGNOSIS — Z86.73 S/P STROKE DUE TO CEREBROVASCULAR DISEASE: ICD-10-CM

## 2022-02-04 RX ORDER — LISINOPRIL AND HYDROCHLOROTHIAZIDE 20; 25 MG/1; MG/1
TABLET ORAL
Qty: 30 TABLET | Refills: 1 | Status: SHIPPED | OUTPATIENT
Start: 2022-02-04 | End: 2022-05-05 | Stop reason: SDUPTHER

## 2022-02-04 RX ORDER — SIMVASTATIN 10 MG/1
TABLET, FILM COATED ORAL
Qty: 90 TABLET | Refills: 1 | Status: SHIPPED | OUTPATIENT
Start: 2022-02-04 | End: 2022-09-30

## 2022-02-04 RX ORDER — METOPROLOL TARTRATE 25 MG/1
TABLET, FILM COATED ORAL
Qty: 60 TABLET | Refills: 0 | Status: SHIPPED | OUTPATIENT
Start: 2022-02-04 | End: 2022-05-18

## 2022-02-04 RX ORDER — CLONIDINE HYDROCHLORIDE 0.2 MG/1
TABLET ORAL
Qty: 90 TABLET | Refills: 1 | Status: SHIPPED | OUTPATIENT
Start: 2022-02-04 | End: 2022-09-30

## 2022-02-16 ENCOUNTER — TELEPHONE (OUTPATIENT)
Dept: RHEUMATOLOGY | Age: 67
End: 2022-02-16

## 2022-03-19 PROBLEM — I63.9 STROKE (HCC): Status: ACTIVE | Noted: 2019-07-16

## 2022-03-19 PROBLEM — E11.21 TYPE II DIABETES MELLITUS WITH NEPHROPATHY (HCC): Status: ACTIVE | Noted: 2019-07-23

## 2022-03-20 PROBLEM — E11.40 TYPE 2 DIABETES MELLITUS WITH DIABETIC NEUROPATHY (HCC): Status: ACTIVE | Noted: 2019-07-16

## 2022-04-19 ENCOUNTER — TELEPHONE (OUTPATIENT)
Dept: RHEUMATOLOGY | Age: 67
End: 2022-04-19

## 2022-04-22 ENCOUNTER — OFFICE VISIT (OUTPATIENT)
Dept: RHEUMATOLOGY | Age: 67
End: 2022-04-22
Payer: MEDICAID

## 2022-04-22 VITALS
DIASTOLIC BLOOD PRESSURE: 92 MMHG | BODY MASS INDEX: 21.01 KG/M2 | SYSTOLIC BLOOD PRESSURE: 163 MMHG | OXYGEN SATURATION: 98 % | WEIGHT: 107 LBS | TEMPERATURE: 98.2 F | HEART RATE: 71 BPM | RESPIRATION RATE: 18 BRPM | HEIGHT: 60 IN

## 2022-04-22 DIAGNOSIS — M05.9 SEROPOSITIVE RHEUMATOID ARTHRITIS (HCC): Primary | ICD-10-CM

## 2022-04-22 DIAGNOSIS — E53.8 VITAMIN B12 DEFICIENCY: ICD-10-CM

## 2022-04-22 DIAGNOSIS — M81.8 OTHER OSTEOPOROSIS WITHOUT CURRENT PATHOLOGICAL FRACTURE: ICD-10-CM

## 2022-04-22 DIAGNOSIS — Z79.899 ONGOING USE OF POSSIBLY TOXIC MEDICATION: ICD-10-CM

## 2022-04-22 DIAGNOSIS — E55.9 VITAMIN D DEFICIENCY: ICD-10-CM

## 2022-04-22 PROCEDURE — 99215 OFFICE O/P EST HI 40 MIN: CPT | Performed by: INTERNAL MEDICINE

## 2022-04-22 RX ORDER — ERGOCALCIFEROL 1.25 MG/1
50000 CAPSULE ORAL
Qty: 12 CAPSULE | Refills: 4 | Status: SHIPPED | OUTPATIENT
Start: 2022-04-22

## 2022-04-22 RX ORDER — PREDNISONE 10 MG/1
TABLET ORAL
Qty: 88 TABLET | Refills: 0 | Status: SHIPPED | OUTPATIENT
Start: 2022-04-22 | End: 2022-06-15 | Stop reason: SDUPTHER

## 2022-04-22 RX ORDER — ALENDRONATE SODIUM 35 MG/1
35 TABLET ORAL
Qty: 4 TABLET | Refills: 5 | Status: SHIPPED | OUTPATIENT
Start: 2022-04-22 | End: 2022-08-22

## 2022-04-22 NOTE — PROGRESS NOTES
Trenton Oleary is a 61 y.o. female who was seen in-office on 4/22/2022 for followup. HISTORY OF DISEASE: Seropositive erosive rheumatoid arthritis    Year of diagnosis: 2019  First visit with this clinic: 10/2019  Cumulative disease manifestations: erosions  Positive serologies/labs: RF, CCP  Negative serologies/labs: Other co-morbidities: CVA with residual right sided hemiparesis, DM with neuropathy, HTN  Relapses:      Past treatment history:  Prednisone: prednisone 5 mg oral daily weaned off in early 2020. Non-biologic DMARD: Methotrexate 20 mg oral weekly (11/2019-->increased to 20 mg oral weekly in 2/2020 pt dc'd 7/21)  Biologic: Rinvoq 12/2019-6/2021  Other:    HISTORY OF PRESENT ILLNESS     Previous medical records reviewed and summarized: yes    Feels stiff all the time now. Knees and ankles swell on and off through the day. Pain is worst in the knees and ankles. Takes 1 BC powder/day. Never took leflunomide, wasn't sure how to use the [employer] coupon. No breathing complaints. No recent infections.     Due to see PCP      REVIEW OF SYSTEMS    Positives as per history  Negatives as follows:  [place]:    Denies unexplained persistent fevers, weight change, chronic fatigue  HEAD/EYES:              Denies eye redness, blurry vision or sudden loss of vision, dry eyes, HA, temporal artery pain  ENT:                            Denies oral/nasal ulcers, recurrent sinus infections, dry mouth  RESPIRATORY:         No pleuritic pain, history of pleural effusions, hemoptysis, exertional dyspnea  CARDIOVASCULAR:             Denies chest pain, history of pericardial effusions  GASTRO:                    Denies heartburn, esophageal dysmotility, abdominal pain, nausea, vomiting, diarrhea, blood in the stool  HEMATOLOGIC:        No easy bruising, purpura, swollen lymph nodes  SKIN:                           Denies alopecia, ulcers, nodules, sun sensitivity, unexplained persistent rash   VASCULAR:                Denies edema, cyanosis, raynaud phenomenon  NEUROLOGIC:           Denies specific muscle weakness, paresthesias   PSYCHIATRIC:           No sleep disturbance / snoring, depression, anxiety  MSK:                           No morning stiffness >1 hour, SI joint pain, persistent joint swelling, persistent joint pain    PAST MEDICAL HISTORY    Past Medical History:   Diagnosis Date    Arthritis     hands/all over    Diabetes mellitus ([facility] 6/15/2015    Dyspepsia and other specified disorders of function of stomach     Foot pain, bilateral 6/15/2015    Hemiparesis affecting left side as late effect of cerebrovascular accident ([address] 2014    Hypercholesteremia 2014    Hypertension     Knee pain, bilateral 6/15/2015    Lipoma of back 2015    Nausea & vomiting     Prediabetes 2015    S/P stroke due to cerebrovascular disease 2014    Screening for breast cancer 9/15/2015    Type II diabetes mellitus with nephropathy ([facility] 2019        Past Surgical History:   Procedure Laterality Date    COLONOSCOPY N/A 10/17/2019    COL[facility] performed by Kristel [facility] END[facility]COPY    HX OTHER SURGICAL      lipoma removed from back    HX TUBAL LIGATION  1984    ME ABDOMEN SURGERY [address] UNLISTED  2004    hernia repair       FAMILY HISTORY    Family History   Problem Relation Age of Onset    Hypertension Mother     Diabetes Mother     Arthritis-rheumatoid Father     Hypertension Brother     Stroke Brother        SOCIAL HISTORY    Social History     Tobacco Use    Smoking status: Former Smoker     Packs/day: 0.25     Years: 35.00     Pack years: 8.75     Quit date: 2009     Years since quittin.9    Smokeless tobacco: Never Used   Vaping Use    Vaping Use: Never used   Substance Use Topics    Alcohol use: No     Comment: ETOH abuse in past; quit     Drug use: No       MEDICATIONS    Current Outpatient Medications Medication Sig Dispense Refill    metoprolol tartrate (LOPRESSOR) 25 mg tablet TAKE 1 TABLET BY MOUTH TWICE A DAY 60 Tablet 0    simvastatin (ZOCOR) 10 mg tablet TAKE 1 TABLET BY MOUTH EVERY NIGHT PATIENT REQUIRES AN OFFICE VISIT FOR ADDITIONAL REFILLS. 90 Tablet 1    lisinopril-hydroCHLOROthiazide (PRINZIDE, ZESTORETIC) 20-25 mg per tablet TAKE 1 TABLET BY MOUTH DAILY. PATIENT REQUIRES AN OFFICE VISIT FOR ADDITIONAL REFILLS. 30 Tablet 1    cloNIDine HCL (CATAPRES) 0.2 mg tablet TAKE 1 TABLET BY MOUTH EVERY DAY AT NIGHT 90 Tablet 1    calcium carbonate-vitamin D3 (CALTRATE 600 PLUS D) 600 mg (1,500 mg)-800 unit chew Take 1 Tab by mouth daily. 90 Tab 3    aspirin 81 mg chewable tablet Take 1 Tab by mouth daily. 30 Tab 11    leflunomide (ARAVA) 10 mg tablet Take 1 Tablet by mouth daily. (Patient not taking: Reported on 4/22/2022) 30 Tablet 2    folic acid (FOLVITE) 1 mg tablet Take 1 Tab by mouth daily. (Patient not taking: Reported on 4/22/2022) 90 Tab 5    metFORMIN (GLUCOPHAGE) 500 mg tablet Take 1 Tab by mouth daily (with breakfast). (Patient not taking: Reported on 8/11/2021) 90 Tab 3       ALLERGIES    Allergies   Allergen Reactions    Codeine Nausea and Vomiting       PHYSICAL EXAMINATION  Visit Vitals  BP (!) 163/92 (BP 1 Location: Left upper arm, BP Patient Position: Sitting)   Pulse 71   Temp 98.2 °F (36.8 °C) (Oral)   Resp 18   Ht 5' (1.524 m)   Wt 107 lb (48.5 kg)   LMP  (LMP Unknown)   SpO2 98%   BMI 20.90 kg/m²     General:  The patient is well developed, well nourished, alert, and in no apparent distress. Eyes: Sclera are anicteric. No conjunctival injection. HEENT:  Oropharynx is clear. No oral ulcers. Adequate salivary pooling. No cervical or supraclavicular lymphadenopathy. Lungs:  Clear to auscultation bilaterally, without wheeze or stridor. Normal respiratory effort. Cor:  Regular rate and rhythm. No murmur rub or gallop.    Abdomen: Soft, non-tender, without hepatomegaly or masses. Extremities: No calf tenderness or edema. Warm and well perfused. Skin:  No significant abnormalities. No nodules. No petechial, purpuric, or psoriaform rashes   Neuro: Nonfocal, no foot or wrist drop. Musculoskeletal:    A comprehensive musculoskeletal exam was performed for all joints of each upper and lower extremity and assessed for swelling, tenderness and range of motion. Results are documented as below:  Guards passive ROM in shoulders, limited to 45deg abduction bilaterally. Elbows stably limited from full extension by 5deg  Bilateral wrist near-fusion, <5deg flexion/extension, persistent tenderness in right > left wrist with interval worsened synovitis. Stable right MCP3-5 fixed flexion deformities to 90deg, tenderness in bilateral MCPs still without synovitis. Left 3rd finger triggering. Right > left ankle synovitis ongoing. Bilateral hammertoes left > right        DATA REVIEW    Prior medical records were reviewed and if applicable are summarized as below:    Labs:   11/3/21: CBC WNL, ESR 56; Cr 0.89, Tbili 0.4, AST 14, ALT 10, AlkP 122; CRP 3mg/L  3/2/21: CBC WNL, ESR 18, Cr 0.83, LFTs WNL, CRP <1mg/L  2/2020: Cbc, cmp unremarkable, lipids normal  12/2019: Cbc, cmp unremarkable  10/2019: Quant gold, HBV, HCV negative, RF, CCP positive, ESR 61, CRP, CMP normal  7/2019: cbc, CMP, TSH normal  5/14/15 Vit D 9.3    Imaging:   Hand xrays (11/3/21): On my review, extensive erosions and collapse of wrists and carpals. Evidence of mild osteoarthritic/osteopenic change involving the bones of the hand. Evidence of marked degenerative change/fusion of the carpal bones. Marked narrowing of the radiocarpal joint. Irregularity of the distal ulna. Hand x-rays (10/2019): Personally reviewed images. + severe erosive changes  Foot x-rays (10/2019): Personally reviewed images.  + erosive and DJD changes    ASSESSMENT AND PLAN    A 61 y.o. female with hx of CVA with right sided weakness and flexion deformity of the hand; DM, seropositive erosive rheumatoid arthritis who has had spreading of her already active synovitis last visit while remaining off of immunosuppression. Self-injection is not feasible with post-stroke right hand contractures, and elevated AlkPhos contraindicate previously planned trial of leflunomide. Applying for infliximab 5mg/kg every 8 weeks with loading dose. 1. Seropositive rheumatoid arthritis (HCC)  - C REACTIVE PROTEIN, QT; Future  - CBC WITH AUTOMATED DIFF; Future  - METABOLIC PANEL, COMPREHENSIVE; Future  - SED RATE (ESR); Future  - QUANTIFERON-TB GOLD PLUS; Future  - VITAMIN B12; Future  - ergocalciferol (ERGOCALCIFEROL) 1,250 mcg (50,000 unit) capsule; Take 1 Capsule by mouth every seven (7) days. Indications: vitamin D deficiency (high dose therapy)  Dispense: 12 Capsule; Refill: 4  - alendronate (FOSAMAX) 35 mg tablet; Take 1 Tablet by mouth every seven (7) days. Dispense: 4 Tablet; Refill: 5  - predniSONE (DELTASONE) 10 mg tablet; Take 20 mg by mouth daily (with breakfast) for 14 days, THEN 10 mg daily (with breakfast) for 60 days. Dispense: 88 Tablet; Refill: 0    2. Other osteoporosis without current pathological fracture  - alendronate (FOSAMAX) 35 mg tablet; Take 1 Tablet by mouth every seven (7) days. Dispense: 4 Tablet; Refill: 5    3. Vitamin D deficiency  - ergocalciferol (ERGOCALCIFEROL) 1,250 mcg (50,000 unit) capsule; Take 1 Capsule by mouth every seven (7) days. Indications: vitamin D deficiency (high dose therapy)  Dispense: 12 Capsule; Refill: 4    4. Vitamin B12 deficiency  - VITAMIN B12; Future    5. Ongoing use of possibly toxic medication  - CBC WITH AUTOMATED DIFF; Future  - METABOLIC PANEL, COMPREHENSIVE; Future  - QUANTIFERON-TB GOLD PLUS; Future    # Medication Toxicity Monitoring:  - cbc, cmp reordered for 1mo toxicity screening on leflunomide  - Hepatitis B, C: negative 10/2019  - Quant gold: negative 10/2019  - Immunizations:  We discussed receiving Shingrix, COVID, influenza, Prevar-13, and Pneumovax-23 vaccines as per the CDC guidelines for immunosuppressed patients. - bone health: caltrate prescribed     Patient Instructions   1. Your rheumatoid arthritis is still very active, and we need to get better control over this. I'm working on getting you started on Remicade infusions, which will initially be about every 2 weeks in our office, and then will be spaced out to every 2 months after a few doses. 2. I'm prescribing you prednisone for short term relief. Start with 20mg daily x 14 days, then decrease to 10mg daily until I see you next. Call Dr. Johanna Johnson if you experience blurred vision, headache, frequent urination, or other signs of high blood sugar. 3. To help protect your bones, for now I'm ordering you Fosamax 35mg to take once a week on an empty stomach. Do not eat or lay down for 1 hour after taking it. We will eventually update your bone density scan, but I want to be sure we focus on getting you the prednisone and Remicade infusions first.    4. Restart ergocalciferol 50,000 units once a week, to improve your vitamin D levels. 5. Labs ASAP, once we get those labs we'll be able to do subsequent blood work when [employer] here for your infusions.     6. Return in 4-6 weeks      TODAY'S ORDERS    Orders Placed This Encounter    QUANTIFERON-TB GOLD PLUS    C REACTIVE PROTEIN, QT    CBC WITH AUTOMATED DIFF    METABOLIC PANEL, COMPREHENSIVE    SED RATE (ESR)    VITAMIN B12    ergocalciferol (ERGOCALCIFEROL) 1,250 mcg (50,000 unit) capsule    alendronate (FOSAMAX) 35 mg tablet    predniSONE (DELTASONE) 10 mg tablet       Future Appointments   Date Time Provider Kaur Herzog   6/15/2022  1:00 PM Dione Guthrie MD [facility] BS [facility]                                                                                                                                                              Face to face time: 25 minutes  Note preparation and records review day of service: 25 minutes  Total provider time day of service: 50 minutes      Frieda Mims MD    Adult Rheumatology   [facility]  A Part of [facility], [address] [place], [address]   Phone [phone]  Fax [phone]

## 2022-04-22 NOTE — PROGRESS NOTES
Chief Complaint   Patient presents with    Arthritis    Joint Pain     feet, knees, wrist     1. Have you been to the ER, urgent care clinic since your last visit? Hospitalized since your last visit? No    2. Have you seen or consulted any other health care providers outside of the [address] [place] [address] since your last visit? Include any pap smears or colon screening.  No

## 2022-04-22 NOTE — LETTER
4/25/2022    Patient: Ramesh Carmona   YOB: 1955   Date of Visit: 4/22/2022     MD Edwige Long. 68.  Mirza Ortega    Dear Katie Romero MD,    We recently saw Ms. Xavier Hernandez in the [facility] for evaluation. My notes for this consultation are attached. If you have questions, please do not hesitate to call me. I look forward to following your patient along with you.       Sincerely,    Kiara Farooq MD [facility]  Cell: [phone]

## 2022-04-22 NOTE — PATIENT INSTRUCTIONS
1. Your rheumatoid arthritis is still very active, and we need to get better control over this. I'm working on getting you started on Remicade infusions, which will initially be about every 2 weeks in our office, and then will be spaced out to every 2 months after a few doses. 2. I'm prescribing you prednisone for short term relief. Start with 20mg daily x 14 days, then decrease to 10mg daily until I see you next. Call Dr. Shailesh Potter if you experience blurred vision, headache, frequent urination, or other signs of high blood sugar. 3. To help protect your bones, for now I'm ordering you Fosamax 35mg to take once a week on an empty stomach. Do not eat or lay down for 1 hour after taking it. We will eventually update your bone density scan, but I want to be sure we focus on getting you the prednisone and Remicade infusions first.    4. Restart ergocalciferol 50,000 units once a week, to improve your vitamin D levels. 5. Labs ASAP, once we get those labs we'll be able to do subsequent blood work when [facility] here for your infusions.     6. Return in 4-6 weeks

## 2022-04-25 PROBLEM — M05.9 SEROPOSITIVE RHEUMATOID ARTHRITIS (HCC): Status: ACTIVE | Noted: 2022-04-25

## 2022-04-25 RX ORDER — DIPHENHYDRAMINE HCL 25 MG
50 TABLET ORAL ONCE
Status: CANCELLED | OUTPATIENT
Start: 2022-05-05 | End: 2022-05-05

## 2022-04-25 RX ORDER — DIPHENHYDRAMINE HYDROCHLORIDE 50 MG/ML
25 INJECTION, SOLUTION INTRAMUSCULAR; INTRAVENOUS AS NEEDED
Status: CANCELLED
Start: 2022-05-05

## 2022-04-25 RX ORDER — ACETAMINOPHEN 325 MG/1
650 TABLET ORAL ONCE
Status: CANCELLED
Start: 2022-05-05 | End: 2022-05-05

## 2022-04-25 RX ORDER — ONDANSETRON 2 MG/ML
8 INJECTION INTRAMUSCULAR; INTRAVENOUS AS NEEDED
Status: CANCELLED | OUTPATIENT
Start: 2022-05-05

## 2022-04-25 RX ORDER — HEPARIN 100 UNIT/ML
300-500 SYRINGE INTRAVENOUS AS NEEDED
Status: CANCELLED
Start: 2022-05-05

## 2022-04-25 RX ORDER — EPINEPHRINE 1 MG/ML
0.3 INJECTION, SOLUTION, CONCENTRATE INTRAVENOUS AS NEEDED
Status: CANCELLED | OUTPATIENT
Start: 2022-05-05

## 2022-04-25 RX ORDER — SODIUM CHLORIDE 9 MG/ML
25 INJECTION, SOLUTION INTRAVENOUS CONTINUOUS
Status: CANCELLED | OUTPATIENT
Start: 2022-05-05

## 2022-04-25 RX ORDER — HYDROCORTISONE SODIUM SUCCINATE 100 MG/2ML
100 INJECTION, POWDER, FOR SOLUTION INTRAMUSCULAR; INTRAVENOUS AS NEEDED
Status: CANCELLED | OUTPATIENT
Start: 2022-05-05

## 2022-04-25 RX ORDER — ACETAMINOPHEN 325 MG/1
650 TABLET ORAL AS NEEDED
Status: CANCELLED
Start: 2022-05-05

## 2022-04-25 RX ORDER — ALBUTEROL SULFATE 0.83 MG/ML
2.5 SOLUTION RESPIRATORY (INHALATION) AS NEEDED
Status: CANCELLED
Start: 2022-05-05

## 2022-04-25 RX ORDER — SODIUM CHLORIDE 0.9 % (FLUSH) 0.9 %
10 SYRINGE (ML) INJECTION AS NEEDED
Status: CANCELLED | OUTPATIENT
Start: 2022-05-05

## 2022-04-25 RX ORDER — SODIUM CHLORIDE 9 MG/ML
10 INJECTION INTRAMUSCULAR; INTRAVENOUS; SUBCUTANEOUS AS NEEDED
Status: CANCELLED | OUTPATIENT
Start: 2022-05-05

## 2022-04-25 RX ORDER — DIPHENHYDRAMINE HYDROCHLORIDE 50 MG/ML
50 INJECTION, SOLUTION INTRAMUSCULAR; INTRAVENOUS AS NEEDED
Status: CANCELLED
Start: 2022-05-05

## 2022-05-05 ENCOUNTER — OFFICE VISIT (OUTPATIENT)
Dept: FAMILY MEDICINE CLINIC | Age: 67
End: 2022-05-05
Payer: MEDICAID

## 2022-05-05 VITALS
WEIGHT: 105.7 LBS | SYSTOLIC BLOOD PRESSURE: 120 MMHG | HEIGHT: 61 IN | TEMPERATURE: 98.7 F | RESPIRATION RATE: 17 BRPM | OXYGEN SATURATION: 97 % | BODY MASS INDEX: 19.95 KG/M2 | DIASTOLIC BLOOD PRESSURE: 80 MMHG | HEART RATE: 84 BPM

## 2022-05-05 DIAGNOSIS — E11.8 TYPE II DIABETES MELLITUS WITH COMPLICATION (HCC): ICD-10-CM

## 2022-05-05 DIAGNOSIS — I10 ESSENTIAL HYPERTENSION, MALIGNANT: Primary | ICD-10-CM

## 2022-05-05 DIAGNOSIS — Z86.73 S/P STROKE DUE TO CEREBROVASCULAR DISEASE: ICD-10-CM

## 2022-05-05 DIAGNOSIS — Z71.89 ADVICE GIVEN ABOUT COVID-19 VIRUS INFECTION: ICD-10-CM

## 2022-05-05 DIAGNOSIS — Z23 ENCOUNTER FOR IMMUNIZATION: ICD-10-CM

## 2022-05-05 LAB — HBA1C MFR BLD HPLC: 6.4 %

## 2022-05-05 PROCEDURE — 99214 OFFICE O/P EST MOD 30 MIN: CPT | Performed by: FAMILY MEDICINE

## 2022-05-05 PROCEDURE — 3044F HG A1C LEVEL LT 7.0%: CPT | Performed by: FAMILY MEDICINE

## 2022-05-05 PROCEDURE — 83036 HEMOGLOBIN GLYCOSYLATED A1C: CPT | Performed by: FAMILY MEDICINE

## 2022-05-05 RX ORDER — LISINOPRIL AND HYDROCHLOROTHIAZIDE 20; 25 MG/1; MG/1
TABLET ORAL
Qty: 90 TABLET | Refills: 3 | Status: SHIPPED | OUTPATIENT
Start: 2022-05-05

## 2022-05-05 RX ORDER — ETODOLAC 400 MG/1
400 TABLET, FILM COATED ORAL
Qty: 40 TABLET | Refills: 1 | Status: SHIPPED | OUTPATIENT
Start: 2022-05-05

## 2022-05-05 RX ORDER — ZOSTER VACCINE RECOMBINANT, ADJUVANTED 50 MCG/0.5
0.5 KIT INTRAMUSCULAR ONCE
Qty: 0.5 ML | Refills: 0 | Status: SHIPPED | OUTPATIENT
Start: 2022-05-05 | End: 2022-05-05 | Stop reason: ALTCHOICE

## 2022-05-05 NOTE — PROGRESS NOTES
Chief Complaint   Patient presents with    Follow-up     for rheumtoid       1. \"Have you been to the ER, urgent care clinic since your last visit? Hospitalized since your last visit? \" Yes Where: Rheutologist    2. \"Have you seen or consulted any other health care providers outside of the [address] [place] [address] since your last visit? \" No     3. For patients aged 39-70: Has the patient had a colonoscopy / FIT/ Cologuard? Yes - no Care Gap present      If the patient is female:    4. For patients aged 41-77: Has the patient had a mammogram within the past 2 years? Yes - no Care Gap present      5. For patients aged 21-65: Has the patient had a pap smear?  Yes - no Care Gap present    Health Maintenance Due   Topic Date Due    COVID-19 Vaccine (1) Never done    Pneumococcal 0-64 years (1 - PCV) Never done    Eye Exam Retinal or Dilated  Never done    Shingrix Vaccine Age 50> (1 of 2) Never done    Foot Exam Q1  08/16/2017    MICROALBUMIN Q1  07/16/2020

## 2022-05-05 NOTE — PROGRESS NOTES
HISTORY OF PRESENT ILLNESS  Christina Trent is a 61 y.o. female, unvaccinated,  present for f/u regarding the diabetic state, patient has not been compliant with diabetic meds, and is not trying to have a diabetic diet,  patient currently denies tingling sensation, has no polyurea and polydipsia, last a1c was at target of 6.8% %,      Back pain with hands deformity of RA, sees Rheumatologist, on high dose steroid and just started the infusion txThe quality of the pain is described as dull. The pain is at a severity of 6/10. Associated symptoms include limited range of motion. Pertinent negatives include no numbness, ++ stiffness, no tingling b/l, no itching, + back pain and no neck pain. The symptoms are aggravated by movement and palpation. There has been no history of extremity trauma, the patient has no incontinence of urine nor of stool,   HTN w/ hx of CVA and rt sided weakness,  pt also present for Bp check , compliant w/ the bp meds, a low salt diet, an  active life style, , today the pt denies Chest Pain, has no legs swelling no lightheadedness,        Current Outpatient Medications   Medication Sig Dispense Refill    ergocalciferol (ERGOCALCIFEROL) 1,250 mcg (50,000 unit) capsule Take 1 Capsule by mouth every seven (7) days. Indications: vitamin D deficiency (high dose therapy) 12 Capsule 4    alendronate (FOSAMAX) 35 mg tablet Take 1 Tablet by mouth every seven (7) days. 4 Tablet 5    predniSONE (DELTASONE) 10 mg tablet Take 20 mg by mouth daily (with breakfast) for 14 days, THEN 10 mg daily (with breakfast) for 60 days. 88 Tablet 0    metoprolol tartrate (LOPRESSOR) 25 mg tablet TAKE 1 TABLET BY MOUTH TWICE A DAY 60 Tablet 0    simvastatin (ZOCOR) 10 mg tablet TAKE 1 TABLET BY MOUTH EVERY NIGHT PATIENT REQUIRES AN OFFICE VISIT FOR ADDITIONAL REFILLS. 90 Tablet 1    lisinopril-hydroCHLOROthiazide (PRINZIDE, ZESTORETIC) 20-25 mg per tablet TAKE 1 TABLET BY MOUTH DAILY.  PATIENT REQUIRES AN OFFICE VISIT FOR ADDITIONAL REFILLS. 30 Tablet 1    cloNIDine HCL (CATAPRES) 0.2 mg tablet TAKE 1 TABLET BY MOUTH EVERY DAY AT NIGHT 90 Tablet 1    aspirin 81 mg chewable tablet Take 1 Tab by mouth daily. 30 Tab 11    folic acid (FOLVITE) 1 mg tablet Take 1 Tab by mouth daily. (Patient not taking: Reported on 4/22/2022) 90 Tab 5    metFORMIN (GLUCOPHAGE) 500 mg tablet Take 1 Tab by mouth daily (with breakfast). (Patient not taking: Reported on 8/11/2021) 90 Tab 3    calcium carbonate-vitamin D3 (CALTRATE 600 PLUS D) 600 mg (1,500 mg)-800 unit chew Take 1 Tab by mouth daily.  (Patient not taking: Reported on 5/5/2022) 90 Tab 3     Allergies   Allergen Reactions    Codeine Nausea and Vomiting     Past Medical History:   Diagnosis Date    Arthritis     hands/all over    Diabetes mellitus ([facility] 6/15/2015    Dyspepsia and other specified disorders of function of stomach     Foot pain, bilateral 6/15/2015    Hemiparesis affecting left side as late effect of cerebrovascular accident ([address] 4/18/2014    Hypercholesteremia 4/18/2014    Hypertension     Knee pain, bilateral 6/15/2015    Lipoma of back 5/14/2015    Nausea & vomiting     Prediabetes 5/14/2015    S/P stroke due to cerebrovascular disease 4/18/2014    Screening for breast cancer 9/15/2015    Type II diabetes mellitus with nephropathy ([facility] 7/23/2019     Past Surgical History:   Procedure Laterality Date    COLONOSCOPY N/A 10/17/2019    [facility] by La Bray MD at [facility] ENDOSCOPY    HX OTHER SURGICAL      lipoma removed from back    HX TUBAL LIGATION  1984    DC ABDOMEN SURGERY [address] UNLISTED  2004    hernia repair     Family History   Problem Relation Age of Onset    Hypertension Mother     Diabetes Mother    [facility] Arthritis-rheumatoid Father     Hypertension Brother     Stroke Brother      Social History     Tobacco Use    Smoking status: Former Smoker     Packs/day: 0.25     Years: 35.00     Pack years: 8.75     Quit date: 2009     Years since quittin.9    Smokeless tobacco: Never Used   Substance Use Topics    Alcohol use: No     Comment: ETOH abuse in past; quit 2012      Lab Results   Component Value Date/Time    WBC 6.8 2021 02:36 PM    HGB 11.5 2021 02:36 PM    HCT 35.2 2021 02:36 PM    PLATELET 785  02:36 PM    MCV 91 2021 02:36 PM     Lab Results   Component Value Date/Time    GFR est non-AA 69 2021 02:36 PM    GFR est AA 79 2021 02:36 PM    Creatinine 0.89 2021 02:36 PM    BUN 23 2021 02:36 PM    Sodium 138 2021 02:36 PM    Potassium 4.4 2021 02:36 PM    Chloride 101 2021 02:36 PM    CO2 23 2021 02:36 PM        Review of Systems   Constitutional: Negative for chills and fever. HENT: Negative for congestion and nosebleeds. Eyes: Negative for blurred vision and pain. Respiratory: Negative for cough, shortness of breath and wheezing. Cardiovascular: Negative for chest pain and leg swelling. Gastrointestinal: Negative for constipation, diarrhea, nausea and vomiting. Genitourinary: Negative for dysuria and frequency. Musculoskeletal: Positive for joint pain. Negative for myalgias. Skin: Negative for itching and rash. Neurological: Negative for dizziness, loss of consciousness and headaches. Psychiatric/Behavioral: Negative for depression. The patient is not nervous/anxious and does not have insomnia. Physical Exam  Vitals and nursing note reviewed. Constitutional:       Appearance: She is well-developed. HENT:      Head: Normocephalic and atraumatic. Eyes:      Conjunctiva/sclera: Conjunctivae normal.      Pupils: Pupils are equal, round, and reactive to light. Neck:      Thyroid: No thyromegaly. Vascular: No JVD. Cardiovascular:      Rate and Rhythm: Normal rate and regular rhythm. Heart sounds: Normal heart sounds. No murmur heard. No friction rub. No gallop.     Pulmonary:      Effort: Pulmonary effort is normal. No respiratory distress. Breath sounds: Normal breath sounds. No stridor. No wheezing or rales. Abdominal:      General: Bowel sounds are normal. There is no distension. Palpations: Abdomen is soft. There is no mass. Tenderness: There is no abdominal tenderness. Musculoskeletal:         General: Swelling, tenderness and deformity present. Cervical back: Normal range of motion and neck supple. Thoracic back: Spasms and tenderness present. Decreased range of motion. Lumbar back: Spasms and tenderness present. Decreased range of motion. Lymphadenopathy:      Cervical: No cervical adenopathy. Skin:     Findings: No erythema or rash. Neurological:      Mental Status: She is alert and oriented to person, place, and time. Cranial Nerves: Cranial nerve deficit present. Sensory: Sensory deficit present. Motor: Weakness present. Coordination: Coordination abnormal.      Gait: Gait abnormal.      Deep Tendon Reflexes: Reflexes abnormal.   Psychiatric:         Behavior: Behavior normal.         ASSESSMENT and PLAN  Diagnoses and all orders for this visit:    1. Essential hypertension, malignant  -     lisinopril-hydroCHLOROthiazide (PRINZIDE, ZESTORETIC) 20-25 mg per tablet; TAKE 1 TABLET BY MOUTH DAILY. PATIENT REQUIRES AN OFFICE VISIT FOR ADDITIONAL REFILLS. -     REFERRAL TO OPTOMETRY    2. S/P stroke due to cerebrovascular disease  -     lisinopril-hydroCHLOROthiazide (PRINZIDE, ZESTORETIC) 20-25 mg per tablet; TAKE 1 TABLET BY MOUTH DAILY. PATIENT REQUIRES AN OFFICE VISIT FOR ADDITIONAL REFILLS. 3. Type II diabetes mellitus with complication (HCC)  -     AMB POC HEMOGLOBIN A1C    4. Encounter for immunization    5. Advice given about COVID-19 virus infection    Other orders  -     etodolac (LODINE) 400 mg tablet; Take 1 Tablet by mouth two (2) times daily as needed for Pain.     A1c stable cont with diabetic diet     pt was advised about the covid protection with vaccination, and to Wear a facemask , having social distance, and to get tested if possible,     patient acknowledged understanding and agreed with today's recommendations,

## 2022-05-07 LAB
ALBUMIN SERPL-MCNC: 4.2 G/DL (ref 3.8–4.8)
ALBUMIN/GLOB SERPL: 1.4 {RATIO} (ref 1.2–2.2)
ALP SERPL-CCNC: 94 IU/L (ref 44–121)
ALT SERPL-CCNC: 14 IU/L (ref 0–32)
AST SERPL-CCNC: 10 IU/L (ref 0–40)
BASOPHILS # BLD AUTO: 0.1 X10E3/UL (ref 0–0.2)
BASOPHILS NFR BLD AUTO: 1 %
BILIRUB SERPL-MCNC: 0.4 MG/DL (ref 0–1.2)
BUN SERPL-MCNC: 18 MG/DL (ref 8–27)
BUN/CREAT SERPL: 26 (ref 12–28)
CALCIUM SERPL-MCNC: 9 MG/DL (ref 8.7–10.3)
CHLORIDE SERPL-SCNC: 98 MMOL/L (ref 96–106)
CO2 SERPL-SCNC: 25 MMOL/L (ref 20–29)
CREAT SERPL-MCNC: 0.68 MG/DL (ref 0.57–1)
CRP SERPL-MCNC: 1 MG/L (ref 0–10)
EGFR: 97 ML/MIN/1.73
EOSINOPHIL # BLD AUTO: 0.1 X10E3/UL (ref 0–0.4)
EOSINOPHIL NFR BLD AUTO: 1 %
ERYTHROCYTE [DISTWIDTH] IN BLOOD BY AUTOMATED COUNT: 13.8 % (ref 11.7–15.4)
ERYTHROCYTE [SEDIMENTATION RATE] IN BLOOD BY WESTERGREN METHOD: 35 MM/HR (ref 0–40)
GAMMA INTERFERON BACKGROUND BLD IA-ACNC: 0.02 IU/ML
GLOBULIN SER CALC-MCNC: 3.1 G/DL (ref 1.5–4.5)
GLUCOSE SERPL-MCNC: 137 MG/DL (ref 65–99)
HCT VFR BLD AUTO: 35.6 % (ref 34–46.6)
HGB BLD-MCNC: 11.1 G/DL (ref 11.1–15.9)
IMM GRANULOCYTES # BLD AUTO: 0.1 X10E3/UL (ref 0–0.1)
IMM GRANULOCYTES NFR BLD AUTO: 1 %
LYMPHOCYTES # BLD AUTO: 3.9 X10E3/UL (ref 0.7–3.1)
LYMPHOCYTES NFR BLD AUTO: 31 %
M TB IFN-G BLD-IMP: NEGATIVE
M TB IFN-G CD4+ BCKGRND COR BLD-ACNC: 0.01 IU/ML
MCH RBC QN AUTO: 28.5 PG (ref 26.6–33)
MCHC RBC AUTO-ENTMCNC: 31.2 G/DL (ref 31.5–35.7)
MCV RBC AUTO: 92 FL (ref 79–97)
MITOGEN IGNF BLD-ACNC: >10 IU/ML
MONOCYTES # BLD AUTO: 0.9 X10E3/UL (ref 0.1–0.9)
MONOCYTES NFR BLD AUTO: 7 %
NEUTROPHILS # BLD AUTO: 7.6 X10E3/UL (ref 1.4–7)
NEUTROPHILS NFR BLD AUTO: 59 %
PLATELET # BLD AUTO: 468 X10E3/UL (ref 150–450)
POTASSIUM SERPL-SCNC: 4.4 MMOL/L (ref 3.5–5.2)
PROT SERPL-MCNC: 7.3 G/DL (ref 6–8.5)
QUANTIFERON INCUBATION, QF1T: NORMAL
QUANTIFERON TB2 AG: 0 IU/ML
RBC # BLD AUTO: 3.89 X10E6/UL (ref 3.77–5.28)
SERVICE CMNT-IMP: NORMAL
SODIUM SERPL-SCNC: 136 MMOL/L (ref 134–144)
VIT B12 SERPL-MCNC: 215 PG/ML (ref 232–1245)
WBC # BLD AUTO: 12.7 X10E3/UL (ref 3.4–10.8)

## 2022-05-16 ENCOUNTER — DOCUMENTATION ONLY (OUTPATIENT)
Dept: FAMILY MEDICINE CLINIC | Age: 67
End: 2022-05-16

## 2022-05-17 DIAGNOSIS — I10 HTN, GOAL BELOW 130/80: ICD-10-CM

## 2022-05-18 RX ORDER — METOPROLOL TARTRATE 25 MG/1
TABLET, FILM COATED ORAL
Qty: 60 TABLET | Refills: 0 | Status: SHIPPED | OUTPATIENT
Start: 2022-05-18 | End: 2022-07-21

## 2022-06-01 ENCOUNTER — DOCUMENTATION ONLY (OUTPATIENT)
Dept: FAMILY MEDICINE CLINIC | Age: 67
End: 2022-06-01

## 2022-06-15 ENCOUNTER — OFFICE VISIT (OUTPATIENT)
Dept: RHEUMATOLOGY | Age: 67
End: 2022-06-15
Payer: MEDICAID

## 2022-06-15 VITALS
RESPIRATION RATE: 18 BRPM | SYSTOLIC BLOOD PRESSURE: 97 MMHG | DIASTOLIC BLOOD PRESSURE: 67 MMHG | TEMPERATURE: 98.2 F | WEIGHT: 106 LBS | BODY MASS INDEX: 20.01 KG/M2 | HEIGHT: 61 IN | HEART RATE: 84 BPM

## 2022-06-15 DIAGNOSIS — M05.9 SEROPOSITIVE RHEUMATOID ARTHRITIS (HCC): Primary | ICD-10-CM

## 2022-06-15 DIAGNOSIS — E53.8 VITAMIN B12 DEFICIENCY: ICD-10-CM

## 2022-06-15 DIAGNOSIS — Z79.899 ONGOING USE OF POSSIBLY TOXIC MEDICATION: ICD-10-CM

## 2022-06-15 DIAGNOSIS — M81.8 OTHER OSTEOPOROSIS WITHOUT CURRENT PATHOLOGICAL FRACTURE: ICD-10-CM

## 2022-06-15 DIAGNOSIS — E55.9 VITAMIN D DEFICIENCY: ICD-10-CM

## 2022-06-15 PROCEDURE — 99215 OFFICE O/P EST HI 40 MIN: CPT | Performed by: INTERNAL MEDICINE

## 2022-06-15 RX ORDER — PREDNISONE 5 MG/1
TABLET ORAL
Qty: 70 TABLET | Refills: 0 | Status: SHIPPED | OUTPATIENT
Start: 2022-06-15 | End: 2022-07-27

## 2022-06-15 RX ORDER — LANOLIN ALCOHOL/MO/W.PET/CERES
1000 CREAM (GRAM) TOPICAL DAILY
Qty: 90 TABLET | Refills: 3 | Status: SHIPPED | OUTPATIENT
Start: 2022-06-15

## 2022-06-15 NOTE — PATIENT INSTRUCTIONS
1) Hold the etodolac until we can get you off of prednisone. 2) Continue weekly fosamax. 3) I am prescribing you vitamin B12 pills. Take 1,000 micrograms daily. 4) After your first remicade infusion drop your prednisone to 5 mg per day. You can stop prednisone after your second infusion. After you stop prednisone you can begin taking your etodolac. 5) We will draw you labs with your second Remicade infusion. 6) Follow up in 2 months. Let me know if you have any questions or concerns in the meantime.

## 2022-06-15 NOTE — PROGRESS NOTES
Liu Weaver is a 61 y.o. female who was seen in-office on 6/15/2022 for followup. HISTORY OF DISEASE: Seropositive erosive rheumatoid arthritis    Year of diagnosis: 2019  First visit with this clinic: 10/2019  Cumulative disease manifestations: erosions  Positive serologies/labs: RF, CCP  Negative serologies/labs: Other co-morbidities: CVA with residual right sided hemiparesis, DM with neuropathy, HTN  Relapses:      Past treatment history:  Prednisone: prednisone 5 mg oral daily weaned off in early 2020. Non-biologic DMARD: Methotrexate 20 mg oral weekly (11/2019-->increased to 20 mg oral weekly in 2/2020 pt dc'd 7/21)  Biologic: Rinvoq 12/2019-6/2021  Other:    HISTORY OF PRESENT ILLNESS     Pt returns for a follow up. Pt is currently taking 10 mg of prednisone per day, which has been helping her joint pain a lot. She continues to have tenderness in the wrists and hands limiting use of these through the day    Pt has been approved for remicade infusions after initial delay due to biosimilar preferences; she has not started getting them yet. Pt denies dyspnea or cough. Pt was prescribed etodolac but she has not started to take it yet.      REVIEW OF SYSTEMS    Positives as per history  Negatives as follows:  Andrzej Salcido:    Denies unexplained persistent fevers, weight change, chronic fatigue  HEAD/EYES:              Denies eye redness, blurry vision or sudden loss of vision, dry eyes, HA, temporal artery pain  ENT:                            Denies oral/nasal ulcers, recurrent sinus infections, dry mouth  RESPIRATORY:         No pleuritic pain, history of pleural effusions, hemoptysis, exertional dyspnea  CARDIOVASCULAR:             Denies chest pain, history of pericardial effusions  GASTRO:                    Denies heartburn, esophageal dysmotility, abdominal pain, nausea, vomiting, diarrhea, blood in the stool  HEMATOLOGIC:        No easy bruising, purpura, swollen lymph nodes  SKIN:                           Denies alopecia, ulcers, nodules, sun sensitivity, unexplained persistent rash   VASCULAR:                Denies edema, cyanosis, raynaud phenomenon  NEUROLOGIC:           Denies specific muscle weakness, paresthesias   PSYCHIATRIC:           No sleep disturbance / snoring, depression, anxiety  MSK:                           No morning stiffness >1 hour, SI joint pain, persistent joint swelling, persistent joint pain    PAST MEDICAL HISTORY    Past Medical History:   Diagnosis Date    Arthritis     hands/all over    Diabetes mellitus ([facility] 6/15/2015    Dyspepsia and other specified disorders of function of stomach     Foot pain, bilateral 6/15/2015    Hemiparesis affecting left side as late effect of cerebrovascular accident ([address] 2014    Hypercholesteremia 2014    Hypertension     Knee pain, bilateral 6/15/2015    Lipoma of back 2015    Nausea & vomiting     Prediabetes 2015    S/P stroke due to cerebrovascular disease 2014    Screening for breast cancer 9/15/2015    Type II diabetes mellitus with nephropathy ([facility] 2019        Past Surgical History:   Procedure Laterality Date    COLONOSCOPY N/A 10/17/2019    COLONOSCOPY performed by Arthur Grady [facility] at [facility] ENDOSCOPY    HX OTHER SURGICAL      lipoma removed from back    HX TUBAL LIGATION  1984    NE ABDOMEN SURGERY [address] UNLISTED  2004    hernia repair       FAMILY HISTORY    Family History   Problem Relation Age of Onset    Hypertension Mother     Diabetes Mother     Arthritis-rheumatoid Father     Hypertension Brother     Stroke Brother        SOCIAL HISTORY    Social History     Tobacco Use    Smoking status: Former Smoker     Packs/day: 0.25     Years: 35.00     Pack years: 8.75     Quit date: 2009     Years since quittin.0    Smokeless tobacco: Never Used   Vaping Use    Vaping Use: Never used   Substance Use Topics    Alcohol use:  No Comment: ETOH abuse in past; quit 2012    Drug use: No       MEDICATIONS    Current Outpatient Medications   Medication Sig Dispense Refill    metoprolol tartrate (LOPRESSOR) 25 mg tablet TAKE 1 TABLET BY MOUTH TWICE A DAY 60 Tablet 0    lisinopril-hydroCHLOROthiazide (PRINZIDE, ZESTORETIC) 20-25 mg per tablet TAKE 1 TABLET BY MOUTH DAILY. PATIENT REQUIRES AN OFFICE VISIT FOR ADDITIONAL REFILLS. 90 Tablet 3    etodolac (LODINE) 400 mg tablet Take 1 Tablet by mouth two (2) times daily as needed for Pain. 40 Tablet 1    ergocalciferol (ERGOCALCIFEROL) 1,250 mcg (50,000 unit) capsule Take 1 Capsule by mouth every seven (7) days. Indications: vitamin D deficiency (high dose therapy) 12 Capsule 4    alendronate (FOSAMAX) 35 mg tablet Take 1 Tablet by mouth every seven (7) days. 4 Tablet 5    predniSONE (DELTASONE) 10 mg tablet Take 20 mg by mouth daily (with breakfast) for 14 days, THEN 10 mg daily (with breakfast) for 60 days. 88 Tablet 0    simvastatin (ZOCOR) 10 mg tablet TAKE 1 TABLET BY MOUTH EVERY NIGHT PATIENT REQUIRES AN OFFICE VISIT FOR ADDITIONAL REFILLS. 90 Tablet 1    cloNIDine HCL (CATAPRES) 0.2 mg tablet TAKE 1 TABLET BY MOUTH EVERY DAY AT NIGHT 90 Tablet 1    folic acid (FOLVITE) 1 mg tablet Take 1 Tab by mouth daily. (Patient not taking: Reported on 4/22/2022) 90 Tab 5    metFORMIN (GLUCOPHAGE) 500 mg tablet Take 1 Tab by mouth daily (with breakfast). (Patient not taking: Reported on 8/11/2021) 90 Tab 3    calcium carbonate-vitamin D3 (CALTRATE 600 PLUS D) 600 mg (1,500 mg)-800 unit chew Take 1 Tab by mouth daily. (Patient not taking: Reported on 5/5/2022) 90 Tab 3    aspirin 81 mg chewable tablet Take 1 Tab by mouth daily.  30 Tab 11       ALLERGIES    Allergies   Allergen Reactions    Codeine Nausea and Vomiting       PHYSICAL EXAMINATION  Visit Vitals  BP 97/67   Pulse 84   Temp 98.2 °F (36.8 °C)   Resp 18   Ht 5' 1\" (1.549 m)   Wt 106 lb (48.1 kg)   LMP  (LMP Unknown)   BMI 20.03 kg/m² General:  The patient is well developed, well nourished, alert, and in no apparent distress. Eyes: Sclera are anicteric. No conjunctival injection. HEENT:  Oropharynx is clear. No oral ulcers. Adequate salivary pooling. No cervical or supraclavicular lymphadenopathy. Lungs:  Clear to auscultation bilaterally, without wheeze or stridor. Normal respiratory effort. Cor:  Regular rate and rhythm. No murmur rub or gallop. Abdomen: Soft, non-tender, without hepatomegaly or masses. Extremities: No calf tenderness or edema. Warm and well perfused. Skin:  No significant abnormalities. No petechial, purpuric, or psoriaform rashes. Neuro: No foot or wrist drop, stable post-CVA weakness right hand. Musculoskeletal:    A comprehensive musculoskeletal exam was performed for all joints of each upper and lower extremity and assessed for swelling, tenderness and range of motion. Results are documented as below: Limited to 15 degrees flexion deformity 1of R elbow. Synovitis and tenderness of bilateral wrists. Tenderness and synovitis of L wrist and L first MCP. Stable right MCP3-5 fixed flexion deformities to 90deg. Synovitis and tenderness of R first MCP. Synovitis in L 2 and 3 MCP without tenderness. Bilateral knee crepitus without warmth or effusion. Bilateral left > right hammertoes. DATA REVIEW    Labs:   5/5/22: Hgb A1c (POC) 6.4, Vit B12 215, QuantiFERON plus negative, ESR 35, Cr 0.68, LFT WNL, WBC 12.7, HGB 11.1, Plt 468, CRP 1 mg/L  11/3/21: CBC WNL, ESR 56; Cr 0.89, Tbili 0.4, AST 14, ALT 10, AlkP 122; CRP 3mg/L  3/2/21: CBC WNL, ESR 18, Cr 0.83, LFTs WNL, CRP <1mg/L  2/2020: Cbc, cmp unremarkable, lipids normal  12/2019: Cbc, cmp unremarkable  10/2019: Quant gold, HBV, HCV negative, RF, CCP positive, ESR 61, CRP, CMP normal  7/2019: cbc, CMP, TSH normal  5/14/15 Vit D 9.3    Imaging:   Hand xrays (11/3/21): On my review, extensive erosions and collapse of wrists and carpals.  Evidence of mild osteoarthritic/osteopenic change involving the bones of the hand. Evidence of marked degenerative change/fusion of the carpal bones. Marked narrowing of the radiocarpal joint. Irregularity of the distal ulna. Hand x-rays (10/2019): Personally reviewed images. + severe erosive changes  Foot x-rays (10/2019): Personally reviewed images. + erosive and DJD changes    ASSESSMENT AND PLAN    A 61 y.o. female with hx of CVA with right sided weakness and flexion deformity of the hand; DM, seropositive erosive rheumatoid arthritis who has still moderately active arthritis on 10mg prednisone while awaiting start of infliximab. Awaiting scheduling of infliximab 5mg/kg every 8 weeks with loading dose, after which will taper prednisone. 1. Seropositive rheumatoid arthritis (HCC)  -Infliximab loading and maintenance, initially 5mg/kg every 8 weeks  - predniSONE (DELTASONE) 5 mg tablet; Take 2 Tablets by mouth daily (with breakfast) for 28 days, THEN 1 Tablet daily (with breakfast) for 14 days. Dispense: 70 Tablet; Refill: 0  - C-TELOPEPTIDE; Future  - VITAMIN D, 25 HYDROXY; Future  - C REACTIVE PROTEIN, QT; Future  - CBC WITH AUTOMATED DIFF; Future  - METABOLIC PANEL, COMPREHENSIVE; Future  - SED RATE (ESR); Future    2. Vitamin B12 deficiency  - cyanocobalamin (Vitamin B-12) 1,000 mcg tablet; Take 1 Tablet by mouth daily. Dispense: 90 Tablet; Refill: 3    3. Ongoing use of possibly toxic medication  - CBC WITH AUTOMATED DIFF; Future  - METABOLIC PANEL, COMPREHENSIVE; Future    4. Other osteoporosis without current pathological fracture  - C-TELOPEPTIDE; Future  - VITAMIN D, 25 HYDROXY; Future    5. Vitamin D deficiency  - VITAMIN D, 25 HYDROXY; Future      Patient Instructions   1) Hold the etodolac until we can get you off of prednisone. 2) Continue weekly fosomax. 3) I am prescribing you vitamin B12 pills. Take 1,000 micrograms daily. 4) After your first remicade infusion drop your prednisone to 5 mg per day. You can stop prednisone after your second infusion. After you stop prednisone you can begin taking your etodolac. 5) We will draw you labs with your second Remicade infusion. 6) Follow up in 2 months. Let me know if you have any questions or concerns in the meantime.        TODAY'S ORDERS    Orders Placed This Encounter    C-TELOPEPTIDE    VITAMIN D, 25 HYDROXY    C REACTIVE PROTEIN, QT    CBC WITH AUTOMATED DIFF    METABOLIC PANEL, COMPREHENSIVE    SED RATE (ESR)    predniSONE (DELTASONE) 5 mg tablet    cyanocobalamin (Vitamin B-12) 1,000 mcg tablet       Future Appointments   Date Time Provider Kaur Herzog   6/15/2022  1:00 PM Priti Anaya MD [facility] BS AMB   9/6/2022 11:20 AM Shahriar Beck MD [facility] BS AMB                                                                                                                                                              Face to face time: 25 minutes  Note preparation and records review day of service:  20 minutes  Total provider time day of service: 45 minutes    This was scribed by Marty Arguelles in the presence of Dr. Anthony Hernandez MD    Adult Rheumatology   [address] [address] [place], [address] [place] [address]   Phone [phone]  Fax [phone]

## 2022-06-16 RX ORDER — ACETAMINOPHEN 325 MG/1
650 TABLET ORAL ONCE
Status: CANCELLED
Start: 2022-06-27 | End: 2022-06-16

## 2022-06-16 RX ORDER — DIPHENHYDRAMINE HYDROCHLORIDE 50 MG/ML
25 INJECTION, SOLUTION INTRAMUSCULAR; INTRAVENOUS AS NEEDED
Status: CANCELLED
Start: 2022-06-27

## 2022-06-16 RX ORDER — DIPHENHYDRAMINE HCL 25 MG
50 CAPSULE ORAL ONCE
Status: CANCELLED | OUTPATIENT
Start: 2022-06-27 | End: 2022-06-16

## 2022-06-16 RX ORDER — EPINEPHRINE 1 MG/ML
0.3 INJECTION, SOLUTION, CONCENTRATE INTRAVENOUS AS NEEDED
Status: CANCELLED | OUTPATIENT
Start: 2022-06-27

## 2022-06-16 RX ORDER — SODIUM CHLORIDE 9 MG/ML
5-250 INJECTION, SOLUTION INTRAVENOUS AS NEEDED
Status: CANCELLED | OUTPATIENT
Start: 2022-06-27

## 2022-06-16 RX ORDER — ALBUTEROL SULFATE 0.83 MG/ML
2.5 SOLUTION RESPIRATORY (INHALATION) AS NEEDED
Status: CANCELLED
Start: 2022-06-27

## 2022-06-16 RX ORDER — HYDROCORTISONE SODIUM SUCCINATE 100 MG/2ML
100 INJECTION, POWDER, FOR SOLUTION INTRAMUSCULAR; INTRAVENOUS AS NEEDED
Status: CANCELLED | OUTPATIENT
Start: 2022-06-27

## 2022-06-16 RX ORDER — SODIUM CHLORIDE 9 MG/ML
5-40 INJECTION INTRAMUSCULAR; INTRAVENOUS; SUBCUTANEOUS AS NEEDED
Status: CANCELLED | OUTPATIENT
Start: 2022-06-27

## 2022-06-16 RX ORDER — DIPHENHYDRAMINE HYDROCHLORIDE 50 MG/ML
50 INJECTION, SOLUTION INTRAMUSCULAR; INTRAVENOUS AS NEEDED
Status: CANCELLED
Start: 2022-06-27

## 2022-06-16 RX ORDER — SODIUM CHLORIDE 0.9 % (FLUSH) 0.9 %
5-40 SYRINGE (ML) INJECTION AS NEEDED
Status: CANCELLED | OUTPATIENT
Start: 2022-06-27

## 2022-06-16 RX ORDER — ACETAMINOPHEN 325 MG/1
650 TABLET ORAL AS NEEDED
Status: CANCELLED
Start: 2022-06-27

## 2022-06-16 RX ORDER — HEPARIN 100 UNIT/ML
500 SYRINGE INTRAVENOUS AS NEEDED
Status: CANCELLED
Start: 2022-06-27

## 2022-06-16 RX ORDER — ONDANSETRON 2 MG/ML
8 INJECTION INTRAMUSCULAR; INTRAVENOUS AS NEEDED
Status: CANCELLED | OUTPATIENT
Start: 2022-06-27

## 2022-06-21 DIAGNOSIS — M05.9 SEROPOSITIVE RHEUMATOID ARTHRITIS (HCC): Primary | ICD-10-CM

## 2022-06-21 NOTE — PROGRESS NOTES
[facility] Rheumatology  OBIC Note    Date: 2022  Name: Sabrina Muhammad  MRN: [id number]       : 1955  Diagnosis: Rheumatoid Arthritis  Treatment: Avsola 200mg Week 0 Induction  Referring Provider: Dr. Perla Mauro  Supervising Provider: Dr. Perla Mauro    Patient arrived to [facility] at [phone]. Ms. Mya Lund allergies reviewed and she agreed to receiving today's treatment. A physical assessment was performed initially and post-treatment. Pt. denies new complaints today. Pts daughter states going forward would like location of infusions to be changed to [address] [place] [address] off [address] as its closer to their house. Will place new referral and cancel remaining OBIC appts. Ms. Miguel Lindsey vitals were monitored before, during and after medication administration. Vitals:    22 1100 22 1115 22 1150 22 1211   BP: (!) 143/79 (!) 153/89 (!) 148/90 (!) 156/88   Pulse: 73 64 66 70   Resp: 16 16 14 16   Temp:       SpO2: 96% 97% 94% 97%     Labs completed in May. Due every 4 months.  Will draw at last induction infusion in August.    Medications given per providers orders:  Administrations This Visit     0.9% sodium chloride infusion     Admin Date  2022 Action  New Bag Dose  20 mL/hr Rate  20 mL/hr Route  IntraVENous Administered By  Gloria Romero RN          acetaminophen (TYLENOL) tablet 650 mg     Admin Date  2022 Action  Given Dose  650 mg Route  Oral Administered By  Gloria Romero RN          diphenhydrAMINE (BENADRYL) capsule 50 mg     Admin Date  2022 Action  Given Dose  50 mg Route  Oral Administered By  Gloria Romero RN          inFLIXimab-axxq (AVSOLA) 200 mg in 0.9% sodium chloride 250 mL, overfill volume 25 mL infusion     Admin Date  2022 Action  New Bag Dose  200 mg Rate  10 mL/hr Route  IntraVENous Administered By  Gloria Romero RN          sodium chloride (NS) flush 5-40 mL     Admin Date  2022 Action  Given Dose  10 mL Route  IntraVENous Administered By  Alma Bell RN                Avsola 200mg   CYV 79957-146-90  Lot # [id number]  Exp 11/30/2024  --mixed in--  250ml 0.9% Normal Saline  FB 1565-1305-80  Lot # [id number]  Exp 05/2023     START: 10ml/hr @ 1000  20ml/hr @ 5738  40ml/hr @ 5747  80ml/hr @ 7148  150ml/hr @ 1100  250ml/hr @ 4375  RUET: 9083     Tylenol 325mg tablets x2 (total dose 650mg)  [facility] 64997-707-41  Lot #   Exp 07/2022     Benadryl 25 mg capsules (Total dose 50mg)  [facility] 5743-4766-04  Lot # [id number]  Exp 01/2023     500ml bag 0.9% Normal Saline @ 25ml/hr  U 3938-7570-45  Lot # [id number]  Exp 10/2024     0.9% Normal Saline 10ml flush  NDC 3231-6868-35  Lot # [id number]  Exp 04/2024    Lines: 24G left FA. Blood return noted and IV site assessed before, during, and after treatment. Line flushed with 10-30 ml's 0.9% Normal Saline solution per protocol. Access was removed from Ms. Hector Figueroa after completion of infusion/injection. Ms. Hector Figueroa tolerated the treatment without complaints and no medication reactions were seen while in presence of this RN. Patient provided with AVS, which included future appointments and written educational material regarding Avsola. All of the patients questions were answered and then discharged ambulatory from Rheumatology OBIC in stable condition at 1215. Encounter routed to supervising provider for co-signing.      Future Appointments   Date Time Provider Kaur Herzog   8/22/2022  1:30 PM Angelica Giraldo MD [facility] BS AMB   9/6/2022 11:20 AM Ghassan Guidry MD [facility] BS AMB     Anne Edwards RN  June 27, 2022

## 2022-06-27 ENCOUNTER — OFFICE VISIT (OUTPATIENT)
Dept: RHEUMATOLOGY | Age: 67
End: 2022-06-27
Payer: MEDICAID

## 2022-06-27 ENCOUNTER — TELEPHONE (OUTPATIENT)
Dept: RHEUMATOLOGY | Age: 67
End: 2022-06-27

## 2022-06-27 VITALS
DIASTOLIC BLOOD PRESSURE: 88 MMHG | RESPIRATION RATE: 16 BRPM | HEART RATE: 70 BPM | OXYGEN SATURATION: 97 % | TEMPERATURE: 97.4 F | SYSTOLIC BLOOD PRESSURE: 156 MMHG

## 2022-06-27 DIAGNOSIS — M05.9 SEROPOSITIVE RHEUMATOID ARTHRITIS (HCC): Primary | ICD-10-CM

## 2022-06-27 PROCEDURE — 96372 THER/PROPH/DIAG INJ SC/IM: CPT

## 2022-06-27 PROCEDURE — 96415 CHEMO IV INFUSION ADDL HR: CPT

## 2022-06-27 PROCEDURE — 96413 CHEMO IV INFUSION 1 HR: CPT

## 2022-06-27 RX ORDER — ACETAMINOPHEN 325 MG/1
650 TABLET ORAL ONCE
Status: CANCELLED
Start: 2022-07-10 | End: 2022-07-10

## 2022-06-27 RX ORDER — SODIUM CHLORIDE 9 MG/ML
5-250 INJECTION, SOLUTION INTRAVENOUS AS NEEDED
Status: CANCELLED | OUTPATIENT
Start: 2022-07-10

## 2022-06-27 RX ORDER — DIPHENHYDRAMINE HCL 25 MG
50 CAPSULE ORAL ONCE
Status: CANCELLED | OUTPATIENT
Start: 2022-07-10 | End: 2022-07-10

## 2022-06-27 RX ORDER — ONDANSETRON 2 MG/ML
8 INJECTION INTRAMUSCULAR; INTRAVENOUS AS NEEDED
Status: CANCELLED | OUTPATIENT
Start: 2022-07-10

## 2022-06-27 RX ORDER — ACETAMINOPHEN 325 MG/1
650 TABLET ORAL AS NEEDED
Status: CANCELLED
Start: 2022-07-10

## 2022-06-27 RX ORDER — ACETAMINOPHEN 325 MG/1
650 TABLET ORAL ONCE
Status: COMPLETED | OUTPATIENT
Start: 2022-06-27 | End: 2022-06-27

## 2022-06-27 RX ORDER — SODIUM CHLORIDE 0.9 % (FLUSH) 0.9 %
5-40 SYRINGE (ML) INJECTION AS NEEDED
Status: CANCELLED | OUTPATIENT
Start: 2022-07-10

## 2022-06-27 RX ORDER — ALBUTEROL SULFATE 0.83 MG/ML
2.5 SOLUTION RESPIRATORY (INHALATION) AS NEEDED
Status: CANCELLED
Start: 2022-07-10

## 2022-06-27 RX ORDER — DIPHENHYDRAMINE HYDROCHLORIDE 50 MG/ML
50 INJECTION, SOLUTION INTRAMUSCULAR; INTRAVENOUS AS NEEDED
Status: CANCELLED
Start: 2022-07-10

## 2022-06-27 RX ORDER — HEPARIN 100 UNIT/ML
500 SYRINGE INTRAVENOUS AS NEEDED
Status: CANCELLED
Start: 2022-07-10

## 2022-06-27 RX ORDER — EPINEPHRINE 1 MG/ML
0.3 INJECTION, SOLUTION, CONCENTRATE INTRAVENOUS AS NEEDED
Status: CANCELLED | OUTPATIENT
Start: 2022-07-10

## 2022-06-27 RX ORDER — HYDROCORTISONE SODIUM SUCCINATE 100 MG/2ML
100 INJECTION, POWDER, FOR SOLUTION INTRAMUSCULAR; INTRAVENOUS AS NEEDED
Status: CANCELLED | OUTPATIENT
Start: 2022-07-10

## 2022-06-27 RX ORDER — SODIUM CHLORIDE 0.9 % (FLUSH) 0.9 %
5-40 SYRINGE (ML) INJECTION AS NEEDED
Status: DISCONTINUED | OUTPATIENT
Start: 2022-06-27 | End: 2022-06-27 | Stop reason: HOSPADM

## 2022-06-27 RX ORDER — DIPHENHYDRAMINE HCL 25 MG
50 CAPSULE ORAL ONCE
Status: COMPLETED | OUTPATIENT
Start: 2022-06-27 | End: 2022-06-27

## 2022-06-27 RX ORDER — DIPHENHYDRAMINE HYDROCHLORIDE 50 MG/ML
25 INJECTION, SOLUTION INTRAMUSCULAR; INTRAVENOUS AS NEEDED
Status: CANCELLED
Start: 2022-07-10

## 2022-06-27 RX ORDER — SODIUM CHLORIDE 9 MG/ML
5-250 INJECTION, SOLUTION INTRAVENOUS AS NEEDED
Status: DISCONTINUED | OUTPATIENT
Start: 2022-06-27 | End: 2022-06-27 | Stop reason: HOSPADM

## 2022-06-27 RX ORDER — SODIUM CHLORIDE 9 MG/ML
5-40 INJECTION INTRAMUSCULAR; INTRAVENOUS; SUBCUTANEOUS AS NEEDED
Status: CANCELLED | OUTPATIENT
Start: 2022-07-10

## 2022-06-27 RX ADMIN — Medication 10 ML: at 09:48

## 2022-06-27 RX ADMIN — ACETAMINOPHEN 650 MG: 325 TABLET ORAL at 09:40

## 2022-06-27 RX ADMIN — SODIUM CHLORIDE 20 ML/HR: 9 INJECTION, SOLUTION INTRAVENOUS at 09:49

## 2022-06-27 RX ADMIN — Medication 50 MG: at 09:40

## 2022-06-27 NOTE — PATIENT INSTRUCTIONS
A referral was sent to [address] [place] [address] for Avsola infusions today per your request.  Violet Molina should hear from their scheduling department to set up your next infusions. If you don't hear from them, you can reach out to them directly for scheduling [phone].

## 2022-07-19 ENCOUNTER — HOSPITAL ENCOUNTER (OUTPATIENT)
Dept: INFUSION THERAPY | Age: 67
Discharge: HOME | End: 2022-07-19
Payer: MEDICAID

## 2022-07-19 VITALS
OXYGEN SATURATION: 100 % | BODY MASS INDEX: 21.3 KG/M2 | RESPIRATION RATE: 18 BRPM | DIASTOLIC BLOOD PRESSURE: 72 MMHG | HEART RATE: 67 BPM | HEIGHT: 60 IN | WEIGHT: 108.5 LBS | SYSTOLIC BLOOD PRESSURE: 127 MMHG | TEMPERATURE: 97.5 F

## 2022-07-19 DIAGNOSIS — M05.9 SEROPOSITIVE RHEUMATOID ARTHRITIS (HCC): Primary | ICD-10-CM

## 2022-07-19 PROCEDURE — 74011000250 HC RX REV CODE- 250: Performed by: INTERNAL MEDICINE

## 2022-07-19 PROCEDURE — 96413 CHEMO IV INFUSION 1 HR: CPT

## 2022-07-19 PROCEDURE — 96415 CHEMO IV INFUSION ADDL HR: CPT

## 2022-07-19 PROCEDURE — 74011250636 HC RX REV CODE- 250/636: Performed by: INTERNAL MEDICINE

## 2022-07-19 PROCEDURE — 74011250637 HC RX REV CODE- 250/637: Performed by: INTERNAL MEDICINE

## 2022-07-19 RX ORDER — DIPHENHYDRAMINE HCL 25 MG
50 CAPSULE ORAL ONCE
Status: CANCELLED | OUTPATIENT
Start: 2022-07-25 | End: 2022-07-25

## 2022-07-19 RX ORDER — SODIUM CHLORIDE 9 MG/ML
5-40 INJECTION INTRAMUSCULAR; INTRAVENOUS; SUBCUTANEOUS AS NEEDED
Status: CANCELLED | OUTPATIENT
Start: 2022-07-25

## 2022-07-19 RX ORDER — SODIUM CHLORIDE 9 MG/ML
5-250 INJECTION, SOLUTION INTRAVENOUS AS NEEDED
Status: CANCELLED | OUTPATIENT
Start: 2022-07-25

## 2022-07-19 RX ORDER — EPINEPHRINE 1 MG/ML
0.3 INJECTION, SOLUTION, CONCENTRATE INTRAVENOUS AS NEEDED
Status: CANCELLED | OUTPATIENT
Start: 2022-07-25

## 2022-07-19 RX ORDER — ACETAMINOPHEN 325 MG/1
650 TABLET ORAL ONCE
Status: CANCELLED
Start: 2022-07-25 | End: 2022-07-25

## 2022-07-19 RX ORDER — HYDROCORTISONE SODIUM SUCCINATE 100 MG/2ML
100 INJECTION, POWDER, FOR SOLUTION INTRAMUSCULAR; INTRAVENOUS AS NEEDED
Status: CANCELLED | OUTPATIENT
Start: 2022-07-25

## 2022-07-19 RX ORDER — ONDANSETRON 2 MG/ML
8 INJECTION INTRAMUSCULAR; INTRAVENOUS AS NEEDED
Status: CANCELLED | OUTPATIENT
Start: 2022-07-25

## 2022-07-19 RX ORDER — SODIUM CHLORIDE 0.9 % (FLUSH) 0.9 %
5-10 SYRINGE (ML) INJECTION AS NEEDED
Status: DISCONTINUED | OUTPATIENT
Start: 2022-07-19 | End: 2022-07-20 | Stop reason: HOSPADM

## 2022-07-19 RX ORDER — SODIUM CHLORIDE 0.9 % (FLUSH) 0.9 %
5-40 SYRINGE (ML) INJECTION AS NEEDED
Status: CANCELLED | OUTPATIENT
Start: 2022-07-25

## 2022-07-19 RX ORDER — ACETAMINOPHEN 325 MG/1
650 TABLET ORAL ONCE
Status: COMPLETED | OUTPATIENT
Start: 2022-07-19 | End: 2022-07-19

## 2022-07-19 RX ORDER — DIPHENHYDRAMINE HYDROCHLORIDE 50 MG/ML
50 INJECTION, SOLUTION INTRAMUSCULAR; INTRAVENOUS AS NEEDED
Status: CANCELLED
Start: 2022-07-25

## 2022-07-19 RX ORDER — DIPHENHYDRAMINE HCL 25 MG
50 CAPSULE ORAL ONCE
Status: COMPLETED | OUTPATIENT
Start: 2022-07-19 | End: 2022-07-19

## 2022-07-19 RX ORDER — ALBUTEROL SULFATE 0.83 MG/ML
2.5 SOLUTION RESPIRATORY (INHALATION) AS NEEDED
Status: CANCELLED
Start: 2022-07-25

## 2022-07-19 RX ORDER — ACETAMINOPHEN 325 MG/1
650 TABLET ORAL AS NEEDED
Status: CANCELLED
Start: 2022-07-25

## 2022-07-19 RX ORDER — DIPHENHYDRAMINE HYDROCHLORIDE 50 MG/ML
25 INJECTION, SOLUTION INTRAMUSCULAR; INTRAVENOUS AS NEEDED
Status: CANCELLED
Start: 2022-07-25

## 2022-07-19 RX ORDER — SODIUM CHLORIDE 9 MG/ML
5-250 INJECTION, SOLUTION INTRAVENOUS AS NEEDED
Status: DISPENSED | OUTPATIENT
Start: 2022-07-19 | End: 2022-07-19

## 2022-07-19 RX ORDER — HEPARIN 100 UNIT/ML
500 SYRINGE INTRAVENOUS AS NEEDED
Status: CANCELLED
Start: 2022-07-25

## 2022-07-19 RX ADMIN — SODIUM CHLORIDE 25 ML/HR: 9 INJECTION, SOLUTION INTRAVENOUS at 10:35

## 2022-07-19 RX ADMIN — ACETAMINOPHEN 650 MG: 325 TABLET ORAL at 10:47

## 2022-07-19 RX ADMIN — Medication 10 ML: at 13:40

## 2022-07-19 RX ADMIN — DIPHENHYDRAMINE HYDROCHLORIDE 50 MG: 25 CAPSULE ORAL at 10:46

## 2022-07-19 RX ADMIN — Medication 10 ML: at 10:35

## 2022-07-19 RX ADMIN — INFLIXIMAB-AXXQ 200 MG: 100 INJECTION, POWDER, LYOPHILIZED, FOR SOLUTION INTRAVENOUS at 11:26

## 2022-07-19 NOTE — PROGRESS NOTES
[facility] Progress Note  Date: July 19, 2022       Treatment: Avsola      Ms. Adriana Gonzalez arrived in no acute distress at 1010. Patient COVID Screening completed:   Do you have any symptoms of COVID-19? SOB, coughing, fever, or generally not feeling well? NO   Have you been exposed to COVID-19 recently? NO   Have you had any recent contact with family/friend that has a pending COVID test? NO    Assessment completed, patient complains of 7/10 aching pain in both hands. Patient statewas unremarkable with no new concerns voiced. 24G PIV established in R A/C with positive blood return noted. Problem: Pain  Goal: *Control of Pain  Outcome: Progressing Towards Goal     Problem: Knowledge Deficit  Goal: *Verbalizes understanding of procedures and medications  Outcome: Progressing Towards Goal     Problem: Patient Education:  Go to Education Activity  Goal: Patient/Family Education  Outcome: Progressing Towards Goal    Ms. Lee's vitals for today's visit.    Patient Vitals for the past 12 hrs:   Temp Pulse Resp BP SpO2   07/19/22 1335 -- 67 -- 127/72 --   07/19/22 1014 97.5 °F (36.4 °C) 75 18 115/73 100 %       Medications given:  Medications Administered     0.9% sodium chloride infusion     Admin Date  07/19/2022 Action  New Bag Dose  25 mL/hr Rate  25 mL/hr Route  IntraVENous Administered By  Liang Daigle RN          acetaminophen (TYLENOL) tablet 650 mg     Admin Date  07/19/2022 Action  Given Dose  650 mg Route  Oral Administered By  Liang Daigle RN          diphenhydrAMINE (BENADRYL) capsule 50 mg     Admin Date  07/19/2022 Action  Given Dose  50 mg Route  Oral Administered By  Liang Daigle RN          inFLIXimab-axxq (AVSOLA) 200 mg in 0.9% sodium chloride 250 mL, overfill volume 25 mL infusion     Admin Date  07/19/2022 Action  New Bag Dose  200 mg Rate  138 mL/hr Route  IntraVENous Administered By  Liang Daigle RN          sodium chloride (NS) flush 5-10 mL     Admin Date  07/19/2022 Action  Given Dose  10 mL Route  IntraVENous Administered By  Luca Bran RN           Admin Date  07/19/2022 Action  Given Dose  10 mL Route  IntraVENous Administered By  Luca Brna RN                Ms. Johan Street tolerated the treatment without complaints. PIV flushed and removed, 2x2 and coban placed. Ms. Johan Street was discharged from [place] [address] in stable condition at 1340.      Future Appointments   Date Time Provider Kaur Herzog   8/2/2022 10:00  [address]   8/22/2022  1:30 PM Irish Oliveira MD [facility] BS AMB   9/6/2022 11:20 AM Kody Zelaya MD [facility] BS AMB       Eryn Carbajal RN  July 19, 2022  10:55 AM

## 2022-07-19 NOTE — DISCHARGE INSTRUCTIONS
Patient Education   Infliximab-abda (By injection)   Infliximab-abda (in-FLIX-i-mab - abda)  Treats rheumatoid arthritis, psoriatic arthritis, ankylosing spondylitis, Crohn disease, plaque psoriasis, and ulcerative colitis. Brand Name(s): Renflexis   There may be other brand names for this medicine. When This Medicine Should Not Be Used: This medicine is not right for everyone. You should not receive it if you had an allergic reaction to infliximab products or murine (mouse) proteins. How to Use This Medicine:   Injectable  · Your doctor will prescribe your dose and schedule. This medicine is given through a needle placed in a vein. This medicine needs to be given slowly. The needle will remain in place for at least 2 hours. · A nurse or other health provider will give you this medicine. · This medicine should come with a Medication Guide. Ask your pharmacist for a copy if you do not have one. Drugs and Foods to Avoid:   Ask your doctor or pharmacist before using any other medicine, including over-the-counter medicines, vitamins, and herbal products. · Some foods and medicines may affect how infliximab-abda works. Tell your doctor if you are using any of the following:  ¨ Abatacept, anakinra, cyclosporine, etanercept, theophylline, tocilizumab  ¨ Blood thinner (including warfarin)  ¨ Medicine that weakens immune system (including methotrexate or a steroid)  · Tell your doctor if you have had light treatment for psoriasis. · You should not receive a vaccine without your doctor's approval. A live virus vaccine could cause an infection. Children should be current on all vaccines before they start treatment with this medicine. If you received infliximab-abda while pregnant, tell the baby's doctor.   Warnings While Using This Medicine:   · Tell your doctor if you are pregnant or breastfeeding, or if you have heart failure, COPD, liver disease, a bleeding disorder, blood or bone marrow problems, cancer, or heart disease. Tell your doctor if you have a history of seizures, multiple sclerosis, Guillain-Barré syndrome, or a similar nervous system disease. · This medicine may cause the following problems:  ¨ Higher risk of lymphoma or other cancers (including skin cancer)  ¨ Liver damage  ¨ Infusion reaction during or after the infusion, or if you start using the medicine again after not receiving it for a long time  ¨ Lupus-like syndrome  · This medicine may make you bleed, bruise, or get infections more easily. Take precautions to prevent illness and injury. Wash your hands often. · This medicine increases your risk of infection, which could result in serious or life-threatening illness. Tell your doctor if you have a history of frequent or serious infections, including tuberculosis or hepatitis B. Make sure your doctor knows if have an infection or already have trouble with your immune system. This risk may be higher in people who are older than 65 years or who have diabetes. Avoid sick people, and wash your hands often. · Your doctor will check your progress and the effects of this medicine at regular visits. Keep all appointments.   Possible Side Effects While Using This Medicine:   Call your doctor right away if you notice any of these side effects:  · Allergic reaction: Itching or hives, swelling in your face or hands, swelling or tingling in your mouth or throat, chest tightness, trouble breathing  · Dark urine or pale stools, nausea, vomiting, loss of appetite, stomach pain, yellow skin or eyes  · Fever, chills, cough, runny or stuffy nose, sore throat, body aches  · Joint or muscle pain or swelling, trouble swallowing  · Headache, lightheadedness, trouble breathing, rash  · Seizures, numbness, tingling, problems with vision, speech, or walking  · Unusual bleeding, bruising, tiredness, or weakness  · Warm, red, swollen, or painful skin, blisters, skin sores  If you notice these less serious side effects, talk with your doctor:   · Redness, pain, or swelling where the needle is placed  If you notice other side effects that you think are caused by this medicine, tell your doctor. Call your doctor for medical advice about side effects. You may report side effects to FDA at 4-591-JCE-1811  © 2017 [facility] Information is for End User's use only and may not be sold, redistributed or otherwise used for commercial purposes. The above information is an  only. It is not intended as medical advice for individual conditions or treatments. Talk to your doctor, nurse or pharmacist before following any medical regimen to see if it is safe and effective for you.

## 2022-07-21 DIAGNOSIS — I10 HTN, GOAL BELOW 130/80: ICD-10-CM

## 2022-07-21 RX ORDER — METOPROLOL TARTRATE 25 MG/1
TABLET, FILM COATED ORAL
Qty: 60 TABLET | Refills: 0 | Status: SHIPPED | OUTPATIENT
Start: 2022-07-21 | End: 2022-10-18

## 2022-08-01 DIAGNOSIS — M05.9 SEROPOSITIVE RHEUMATOID ARTHRITIS (HCC): Primary | ICD-10-CM

## 2022-08-01 RX ORDER — SODIUM CHLORIDE 9 MG/ML
5-250 INJECTION, SOLUTION INTRAVENOUS AS NEEDED
Status: CANCELLED | OUTPATIENT
Start: 2022-08-02

## 2022-08-01 RX ORDER — ACETAMINOPHEN 325 MG/1
650 TABLET ORAL ONCE
Status: CANCELLED
Start: 2022-08-02 | End: 2022-08-02

## 2022-08-01 RX ORDER — DIPHENHYDRAMINE HCL 25 MG
50 CAPSULE ORAL ONCE
Status: CANCELLED | OUTPATIENT
Start: 2022-08-02 | End: 2022-08-02

## 2022-08-02 ENCOUNTER — HOSPITAL ENCOUNTER (OUTPATIENT)
Dept: INFUSION THERAPY | Age: 67
Discharge: HOME | End: 2022-08-02
Payer: MEDICARE

## 2022-08-02 VITALS
RESPIRATION RATE: 18 BRPM | OXYGEN SATURATION: 100 % | HEIGHT: 60 IN | SYSTOLIC BLOOD PRESSURE: 120 MMHG | TEMPERATURE: 97.8 F | BODY MASS INDEX: 21.6 KG/M2 | DIASTOLIC BLOOD PRESSURE: 78 MMHG | HEART RATE: 63 BPM | WEIGHT: 110 LBS

## 2022-08-02 DIAGNOSIS — M05.9 SEROPOSITIVE RHEUMATOID ARTHRITIS (HCC): Primary | ICD-10-CM

## 2022-08-02 LAB
ALBUMIN SERPL-MCNC: 3.1 G/DL (ref 3.5–5)
ALBUMIN/GLOB SERPL: 0.8 {RATIO} (ref 1.1–2.2)
ALP SERPL-CCNC: 63 U/L (ref 45–117)
ALT SERPL-CCNC: 16 U/L (ref 12–78)
ANION GAP SERPL CALC-SCNC: 10 MMOL/L (ref 5–15)
AST SERPL-CCNC: 16 U/L (ref 15–37)
BASOPHILS # BLD: 0.1 K/UL (ref 0–0.1)
BASOPHILS NFR BLD: 1 % (ref 0–1)
BILIRUB SERPL-MCNC: 0.4 MG/DL (ref 0.2–1)
BUN SERPL-MCNC: 25 MG/DL (ref 6–20)
BUN/CREAT SERPL: 27 (ref 12–20)
CALCIUM SERPL-MCNC: 8.4 MG/DL (ref 8.5–10.1)
CHLORIDE SERPL-SCNC: 102 MMOL/L (ref 97–108)
CO2 SERPL-SCNC: 24 MMOL/L (ref 21–32)
CREAT SERPL-MCNC: 0.91 MG/DL (ref 0.55–1.02)
CRP SERPL-MCNC: <0.29 MG/DL (ref 0–0.6)
DIFFERENTIAL METHOD BLD: ABNORMAL
EOSINOPHIL # BLD: 0.1 K/UL (ref 0–0.4)
EOSINOPHIL NFR BLD: 1 % (ref 0–7)
ERYTHROCYTE [DISTWIDTH] IN BLOOD BY AUTOMATED COUNT: 15.4 % (ref 11.5–14.5)
ERYTHROCYTE [SEDIMENTATION RATE] IN BLOOD: 100 MM/HR (ref 0–30)
GLOBULIN SER CALC-MCNC: 3.8 G/DL (ref 2–4)
GLUCOSE SERPL-MCNC: 140 MG/DL (ref 65–100)
HCT VFR BLD AUTO: 34.1 % (ref 35–47)
HGB BLD-MCNC: 10.9 G/DL (ref 11.5–16)
IMM GRANULOCYTES # BLD AUTO: 0 K/UL (ref 0–0.04)
IMM GRANULOCYTES NFR BLD AUTO: 1 % (ref 0–0.5)
LYMPHOCYTES # BLD: 3.5 K/UL (ref 0.8–3.5)
LYMPHOCYTES NFR BLD: 42 % (ref 12–49)
MCH RBC QN AUTO: 29.1 PG (ref 26–34)
MCHC RBC AUTO-ENTMCNC: 32 G/DL (ref 30–36.5)
MCV RBC AUTO: 90.9 FL (ref 80–99)
MONOCYTES # BLD: 0.5 K/UL (ref 0–1)
MONOCYTES NFR BLD: 6 % (ref 5–13)
NEUTS SEG # BLD: 4.2 K/UL (ref 1.8–8)
NEUTS SEG NFR BLD: 49 % (ref 32–75)
NRBC # BLD: 0 K/UL (ref 0–0.01)
NRBC BLD-RTO: 0 PER 100 WBC
PLATELET # BLD AUTO: 292 K/UL (ref 150–400)
PMV BLD AUTO: 9.7 FL (ref 8.9–12.9)
POTASSIUM SERPL-SCNC: 4.4 MMOL/L (ref 3.5–5.1)
PROT SERPL-MCNC: 6.9 G/DL (ref 6.4–8.2)
RBC # BLD AUTO: 3.75 M/UL (ref 3.8–5.2)
SODIUM SERPL-SCNC: 136 MMOL/L (ref 136–145)
WBC # BLD AUTO: 8.4 K/UL (ref 3.6–11)

## 2022-08-02 PROCEDURE — 74011250636 HC RX REV CODE- 250/636: Performed by: INTERNAL MEDICINE

## 2022-08-02 PROCEDURE — 96417 CHEMO IV INFUS EACH ADDL SEQ: CPT

## 2022-08-02 PROCEDURE — 85652 RBC SED RATE AUTOMATED: CPT

## 2022-08-02 PROCEDURE — 86140 C-REACTIVE PROTEIN: CPT

## 2022-08-02 PROCEDURE — 96413 CHEMO IV INFUSION 1 HR: CPT

## 2022-08-02 PROCEDURE — 80053 COMPREHEN METABOLIC PANEL: CPT

## 2022-08-02 PROCEDURE — 85025 COMPLETE CBC W/AUTO DIFF WBC: CPT

## 2022-08-02 PROCEDURE — 36415 COLL VENOUS BLD VENIPUNCTURE: CPT

## 2022-08-02 PROCEDURE — 74011250637 HC RX REV CODE- 250/637: Performed by: INTERNAL MEDICINE

## 2022-08-02 PROCEDURE — 74011000250 HC RX REV CODE- 250: Performed by: INTERNAL MEDICINE

## 2022-08-02 RX ORDER — SODIUM CHLORIDE 9 MG/ML
5-250 INJECTION, SOLUTION INTRAVENOUS AS NEEDED
Status: CANCELLED | OUTPATIENT
Start: 2022-10-03

## 2022-08-02 RX ORDER — ACETAMINOPHEN 325 MG/1
650 TABLET ORAL ONCE
Status: COMPLETED | OUTPATIENT
Start: 2022-08-02 | End: 2022-08-02

## 2022-08-02 RX ORDER — SODIUM CHLORIDE 0.9 % (FLUSH) 0.9 %
5-40 SYRINGE (ML) INJECTION AS NEEDED
Status: CANCELLED | OUTPATIENT
Start: 2022-10-03

## 2022-08-02 RX ORDER — ACETAMINOPHEN 325 MG/1
650 TABLET ORAL ONCE
Status: CANCELLED
Start: 2022-10-03 | End: 2022-10-03

## 2022-08-02 RX ORDER — DIPHENHYDRAMINE HCL 25 MG
50 CAPSULE ORAL ONCE
Status: CANCELLED | OUTPATIENT
Start: 2022-10-03 | End: 2022-10-03

## 2022-08-02 RX ORDER — HEPARIN 100 UNIT/ML
500 SYRINGE INTRAVENOUS AS NEEDED
Status: CANCELLED
Start: 2022-10-03

## 2022-08-02 RX ORDER — SODIUM CHLORIDE 0.9 % (FLUSH) 0.9 %
5-40 SYRINGE (ML) INJECTION AS NEEDED
Status: DISPENSED | OUTPATIENT
Start: 2022-08-02 | End: 2022-08-02

## 2022-08-02 RX ORDER — SODIUM CHLORIDE 9 MG/ML
5-40 INJECTION INTRAMUSCULAR; INTRAVENOUS; SUBCUTANEOUS AS NEEDED
Status: CANCELLED | OUTPATIENT
Start: 2022-10-03

## 2022-08-02 RX ORDER — ONDANSETRON 2 MG/ML
8 INJECTION INTRAMUSCULAR; INTRAVENOUS AS NEEDED
Status: CANCELLED | OUTPATIENT
Start: 2022-10-03

## 2022-08-02 RX ORDER — DIPHENHYDRAMINE HYDROCHLORIDE 50 MG/ML
25 INJECTION, SOLUTION INTRAMUSCULAR; INTRAVENOUS AS NEEDED
Status: CANCELLED
Start: 2022-10-03

## 2022-08-02 RX ORDER — ALBUTEROL SULFATE 0.83 MG/ML
2.5 SOLUTION RESPIRATORY (INHALATION) AS NEEDED
Status: CANCELLED
Start: 2022-10-03

## 2022-08-02 RX ORDER — HYDROCORTISONE SODIUM SUCCINATE 100 MG/2ML
100 INJECTION, POWDER, FOR SOLUTION INTRAMUSCULAR; INTRAVENOUS AS NEEDED
Status: CANCELLED | OUTPATIENT
Start: 2022-10-03

## 2022-08-02 RX ORDER — SODIUM CHLORIDE 9 MG/ML
5-250 INJECTION, SOLUTION INTRAVENOUS AS NEEDED
Status: DISPENSED | OUTPATIENT
Start: 2022-08-02 | End: 2022-08-02

## 2022-08-02 RX ORDER — DIPHENHYDRAMINE HCL 25 MG
50 CAPSULE ORAL ONCE
Status: COMPLETED | OUTPATIENT
Start: 2022-08-02 | End: 2022-08-02

## 2022-08-02 RX ORDER — DIPHENHYDRAMINE HYDROCHLORIDE 50 MG/ML
50 INJECTION, SOLUTION INTRAMUSCULAR; INTRAVENOUS AS NEEDED
Status: CANCELLED
Start: 2022-10-03

## 2022-08-02 RX ORDER — ACETAMINOPHEN 325 MG/1
650 TABLET ORAL AS NEEDED
Status: CANCELLED
Start: 2022-10-03

## 2022-08-02 RX ORDER — EPINEPHRINE 1 MG/ML
0.3 INJECTION, SOLUTION, CONCENTRATE INTRAVENOUS AS NEEDED
Status: CANCELLED | OUTPATIENT
Start: 2022-10-03

## 2022-08-02 RX ADMIN — DIPHENHYDRAMINE HYDROCHLORIDE 50 MG: 25 CAPSULE ORAL at 10:36

## 2022-08-02 RX ADMIN — ACETAMINOPHEN 650 MG: 325 TABLET ORAL at 10:36

## 2022-08-02 RX ADMIN — INFLIXIMAB-AXXQ 200 MG: 100 INJECTION, POWDER, LYOPHILIZED, FOR SOLUTION INTRAVENOUS at 11:10

## 2022-08-02 RX ADMIN — Medication 10 ML: at 10:25

## 2022-08-02 RX ADMIN — SODIUM CHLORIDE 25 ML/HR: 9 INJECTION, SOLUTION INTRAVENOUS at 10:38

## 2022-08-02 NOTE — PROGRESS NOTES
[facility] Progress Note    Date: 2022    Name: Coral Garrett    MRN: [id number]         : 1955    Ms. Ras Caro Arrived ambulatory and in no distress for [place]. Assessment was completed, no acute issues at this time, no new complaints voiced. 24 gauge IV placed in right [facility]  without difficulty, labs drawn & sent for processing. Ms. Cuco Mena vitals were reviewed. Patient Vitals for the past 12 hrs:   Temp Pulse Resp BP SpO2   22 1317 97.8 °F (36.6 °C) 63 18 120/78 --   22 1016 97.6 °F (36.4 °C) 66 18 (!) 126/90 100 %       Lab results were obtained and reviewed. Recent Results (from the past 12 hour(s))   CBC WITH AUTOMATED DIFF    Collection Time: 22 10:26 AM   Result Value Ref Range    WBC 8.4 3.6 - 11.0 K/uL    RBC 3.75 (L) 3.80 - 5.20 M/uL    HGB 10.9 (L) 11.5 - 16.0 g/dL    HCT 34.1 (L) 35.0 - 47.0 %    MCV 90.9 80.0 - 99.0 FL    MCH 29.1 26.0 - 34.0 PG    MCHC 32.0 30.0 - 36.5 g/dL    RDW 15.4 (H) 11.5 - 14.5 %    PLATELET 282 068 - 225 K/uL    MPV 9.7 8.9 - 12.9 FL    NRBC 0.0 0  WBC    ABSOLUTE NRBC 0.00 0.00 - 0.01 K/uL    NEUTROPHILS 49 32 - 75 %    LYMPHOCYTES 42 12 - 49 %    MONOCYTES 6 5 - 13 %    EOSINOPHILS 1 0 - 7 %    BASOPHILS 1 0 - 1 %    IMMATURE GRANULOCYTES 1 (H) 0.0 - 0.5 %    ABS. NEUTROPHILS 4.2 1.8 - 8.0 K/UL    ABS. LYMPHOCYTES 3.5 0.8 - 3.5 K/UL    ABS. MONOCYTES 0.5 0.0 - 1.0 K/UL    ABS. EOSINOPHILS 0.1 0.0 - 0.4 K/UL    ABS. BASOPHILS 0.1 0.0 - 0.1 K/UL    ABS. IMM.  GRANS. 0.0 0.00 - 0.04 K/UL    DF AUTOMATED     METABOLIC PANEL, COMPREHENSIVE    Collection Time: 22 10:26 AM   Result Value Ref Range    Sodium 136 136 - 145 mmol/L    Potassium 4.4 3.5 - 5.1 mmol/L    Chloride 102 97 - 108 mmol/L    CO2 24 21 - 32 mmol/L    Anion gap 10 5 - 15 mmol/L    Glucose 140 (H) 65 - 100 mg/dL    BUN 25 (H) 6 - 20 MG/DL    Creatinine 0.91 0.55 - 1.02 MG/DL    BUN/Creatinine ratio 27 (H) 12 - 20      GFR est AA >60 >60 ml/min/1.73m2    GFR est non-AA >60 >60 ml/min/1.73m2    Calcium 8.4 (L) 8.5 - 10.1 MG/DL    Bilirubin, total 0.4 0.2 - 1.0 MG/DL    ALT (SGPT) 16 12 - 78 U/L    AST (SGOT) 16 15 - 37 U/L    Alk. phosphatase 63 45 - 117 U/L    Protein, total 6.9 6.4 - 8.2 g/dL    Albumin 3.1 (L) 3.5 - 5.0 g/dL    Globulin 3.8 2.0 - 4.0 g/dL    A-G Ratio 0.8 (L) 1.1 - 2.2     SED RATE (ESR)    Collection Time: 08/02/22 10:26 AM   Result Value Ref Range    Sed rate, automated 100 (H) 0 - 30 mm/hr   C REACTIVE PROTEIN, QT    Collection Time: 08/02/22 10:26 AM   Result Value Ref Range    C-Reactive protein <0.29 0.00 - 0.60 mg/dL       Medications:  Medications Administered       0.9% sodium chloride infusion       Admin Date  08/02/2022 Action  New Bag Dose  25 mL/hr Rate  25 mL/hr Route  IntraVENous Administered By  Glen Womack RN              acetaminophen (TYLENOL) tablet 650 mg       Admin Date  08/02/2022 Action  Given Dose  650 mg Route  Oral Administered By  Glen Womack RN              diphenhydrAMINE (BENADRYL) capsule 50 mg       Admin Date  08/02/2022 Action  Given Dose  50 mg Route  Oral Administered By  Glen Womack RN              inFLIXimab-axxq (AVSOLA) 200 mg in 0.9% sodium chloride 250 mL, overfill volume 25 mL infusion       Admin Date  08/02/2022 Action  New Bag Dose  200 mg Route  IntraVENous Administered By  Kathrine Burroughs RN              sodium chloride (NS) flush 5-40 mL       Admin Date  08/02/2022 Action  Given Dose  10 mL Route  IntraVENous Administered By  Glen Womack RN                    Ms. Axel Holland tolerated treatment well and was discharged from [place] [address] in stable condition at 1330. She is to return on 10/03/2022 for her next appointment.     Shamir Madrigal RN  August 2, 2022

## 2022-08-02 NOTE — DISCHARGE INSTRUCTIONS
Infliximab-abda (By injection)   Infliximab-abda (in-FLIX-i-mab - abda)  Treats rheumatoid arthritis, psoriatic arthritis, ankylosing spondylitis, Crohn disease, plaque psoriasis, and ulcerative colitis. Brand Name(s): Renflexis   There may be other brand names for this medicine. When This Medicine Should Not Be Used: This medicine is not right for everyone. You should not receive it if you had an allergic reaction to infliximab products or murine (mouse) proteins. How to Use This Medicine:   Injectable  Your doctor will prescribe your dose and schedule. This medicine is given through a needle placed in a vein. This medicine needs to be given slowly. The needle will remain in place for at least 2 hours. A nurse or other health provider will give you this medicine. This medicine should come with a Medication Guide. Ask your pharmacist for a copy if you do not have one. Drugs and Foods to Avoid:   Ask your doctor or pharmacist before using any other medicine, including over-the-counter medicines, vitamins, and herbal products. Some foods and medicines may affect how infliximab-abda works. Tell your doctor if you are using any of the following:  Abatacept, anakinra, cyclosporine, etanercept, theophylline, tocilizumab  Blood thinner (including warfarin)  Medicine that weakens immune system (including methotrexate or a steroid)  Tell your doctor if you have had light treatment for psoriasis. You should not receive a vaccine without your doctor's approval. A live virus vaccine could cause an infection. Children should be current on all vaccines before they start treatment with this medicine. If you received infliximab-abda while pregnant, tell the baby's doctor. Warnings While Using This Medicine:   Tell your doctor if you are pregnant or breastfeeding, or if you have heart failure, COPD, liver disease, a bleeding disorder, blood or bone marrow problems, cancer, or heart disease.  Tell your doctor if you have a history of seizures, multiple sclerosis, Guillain-Barré syndrome, or a similar nervous system disease. This medicine may cause the following problems:  Higher risk of lymphoma or other cancers (including skin cancer)  Liver damage  Infusion reaction during or after the infusion, or if you start using the medicine again after not receiving it for a long time  Lupus-like syndrome  This medicine may make you bleed, bruise, or get infections more easily. Take precautions to prevent illness and injury. Wash your hands often. This medicine increases your risk of infection, which could result in serious or life-threatening illness. Tell your doctor if you have a history of frequent or serious infections, including tuberculosis or hepatitis B. Make sure your doctor knows if have an infection or already have trouble with your immune system. This risk may be higher in people who are older than 65 years or who have diabetes. Avoid sick people, and wash your hands often. Your doctor will check your progress and the effects of this medicine at regular visits. Keep all appointments. Possible Side Effects While Using This Medicine:   Call your doctor right away if you notice any of these side effects:   Allergic reaction: Itching or hives, swelling in your face or hands, swelling or tingling in your mouth or throat, chest tightness, trouble breathing  Dark urine or pale stools, nausea, vomiting, loss of appetite, stomach pain, yellow skin or eyes  Fever, chills, cough, runny or stuffy nose, sore throat, body aches  Joint or muscle pain or swelling, trouble swallowing  Headache, lightheadedness, trouble breathing, rash  Seizures, numbness, tingling, problems with vision, speech, or walking  Unusual bleeding, bruising, tiredness, or weakness  Warm, red, swollen, or painful skin, blisters, skin sores  If you notice these less serious side effects, talk with your doctor:   Redness, pain, or swelling where the needle is placed  If you notice other side effects that you think are caused by this medicine, tell your doctor. Call your doctor for medical advice about side effects. You may report side effects to FDA at 6-889-VHI-9511  © 2017 [facility]– [place] Information is for End User's use only and may not be sold, redistributed or otherwise used for commercial purposes. The above information is an  only. It is not intended as medical advice for individual conditions or treatments. Talk to your doctor, nurse or pharmacist before following any medical regimen to see if it is safe and effective for you.

## 2022-08-22 ENCOUNTER — OFFICE VISIT (OUTPATIENT)
Dept: RHEUMATOLOGY | Age: 67
End: 2022-08-22
Payer: MEDICARE

## 2022-08-22 VITALS
BODY MASS INDEX: 21.09 KG/M2 | SYSTOLIC BLOOD PRESSURE: 135 MMHG | DIASTOLIC BLOOD PRESSURE: 84 MMHG | HEART RATE: 88 BPM | TEMPERATURE: 98.1 F | RESPIRATION RATE: 18 BRPM | WEIGHT: 108 LBS

## 2022-08-22 DIAGNOSIS — M81.0 AGE-RELATED OSTEOPOROSIS WITHOUT CURRENT PATHOLOGICAL FRACTURE: ICD-10-CM

## 2022-08-22 DIAGNOSIS — D84.9 IMMUNOSUPPRESSION (HCC): ICD-10-CM

## 2022-08-22 DIAGNOSIS — M05.9 SEROPOSITIVE RHEUMATOID ARTHRITIS (HCC): Primary | ICD-10-CM

## 2022-08-22 DIAGNOSIS — M72.2 PLANTAR FASCIITIS OF RIGHT FOOT: ICD-10-CM

## 2022-08-22 PROCEDURE — G8420 CALC BMI NORM PARAMETERS: HCPCS | Performed by: INTERNAL MEDICINE

## 2022-08-22 PROCEDURE — 1123F ACP DISCUSS/DSCN MKR DOCD: CPT | Performed by: INTERNAL MEDICINE

## 2022-08-22 PROCEDURE — G0463 HOSPITAL OUTPT CLINIC VISIT: HCPCS | Performed by: INTERNAL MEDICINE

## 2022-08-22 PROCEDURE — G9899 SCRN MAM PERF RSLTS DOC: HCPCS | Performed by: INTERNAL MEDICINE

## 2022-08-22 PROCEDURE — 3017F COLORECTAL CA SCREEN DOC REV: CPT | Performed by: INTERNAL MEDICINE

## 2022-08-22 PROCEDURE — G8510 SCR DEP NEG, NO PLAN REQD: HCPCS | Performed by: INTERNAL MEDICINE

## 2022-08-22 PROCEDURE — 1090F PRES/ABSN URINE INCON ASSESS: CPT | Performed by: INTERNAL MEDICINE

## 2022-08-22 PROCEDURE — G8427 DOCREV CUR MEDS BY ELIG CLIN: HCPCS | Performed by: INTERNAL MEDICINE

## 2022-08-22 PROCEDURE — G8754 DIAS BP LESS 90: HCPCS | Performed by: INTERNAL MEDICINE

## 2022-08-22 PROCEDURE — G8536 NO DOC ELDER MAL SCRN: HCPCS | Performed by: INTERNAL MEDICINE

## 2022-08-22 PROCEDURE — G8752 SYS BP LESS 140: HCPCS | Performed by: INTERNAL MEDICINE

## 2022-08-22 PROCEDURE — 99214 OFFICE O/P EST MOD 30 MIN: CPT | Performed by: INTERNAL MEDICINE

## 2022-08-22 PROCEDURE — 1101F PT FALLS ASSESS-DOCD LE1/YR: CPT | Performed by: INTERNAL MEDICINE

## 2022-08-22 NOTE — PATIENT INSTRUCTIONS
1) Continue to receive your Remicade infusions as scheduled. 2) Take 5mg of Prednisone every other day for 2 weeks and then stop completely. Let me know if you are feeling more stiffness and pain once you are done with Prednisone. 3) Stop your Fosamax. 4) Follow up with your physical therapy. 5) I am ordering you a bone density scan. Call [phone] to schedule. 6) We will check labs with your infusions. 7) Follow up in 3 months. Let me know if you have any questions or concerns in the meantime.

## 2022-08-22 NOTE — PROGRESS NOTES
Karma Cabrera is a 74 y.o. female who was seen in-office on 8/22/2022 for followup. HISTORY OF DISEASE: Seropositive erosive rheumatoid arthritis    Year of diagnosis: 2019  First visit with this clinic: 10/2019  Cumulative disease manifestations: erosions  Positive serologies/labs: RF, CCP  Negative serologies/labs: Other co-morbidities: CVA with residual right sided hemiparesis, DM with neuropathy, HTN  Relapses:      Past treatment history:  Prednisone: prednisone 20->5mg tapered, 4/22- now  Non-biologic DMARD: Methotrexate 20 mg oral weekly (11/2019-->increased to 20 mg oral weekly in 2/2020 pt dc'd 7/21)  Biologic: Rinvoq 12/2019-6/2021, infliximab 5mg/kg q8wk (7/19/22- )  Other:    HISTORY OF PRESENT ILLNESS     Pt returns for a follow up. Pt says that she takes 5mg of Prednisone daily. However, she is not consistent with taking the Prednisone every day. Pt reports that she already feels better after receiving 2 remicade infusions which she tolerates well. She denies any UTIs or other infections since starting the infusions. Pt still takes Fosamax once per week. Pt notes that most of her pain is in her hands and the bottom of her feet. She says that her foot pain is the worst when she wakes up in the morning and puts weight on her feet. She says that it takes about 30 minutes for her to loosen up in the morning. Pt denies numbness in her feet. Pt reports that she went to the hand doctor who sent her to physical therapy for her hands in the absence of clear surgical targets to improve function. She has not seen an occupational therapist to date. Pt denies any problems with breathing. She says that she has a cough \"every now and then\" when she gets a cold.       REVIEW OF SYSTEMS    Positives as per history  Negatives as follows:  CONSTITUTlONAL:    Denies unexplained persistent fevers, weight change, chronic fatigue  HEAD/EYES:              Denies eye redness, blurry vision or sudden loss of vision, dry eyes, HA, temporal artery pain  ENT:                            Denies oral/nasal ulcers, recurrent sinus infections, dry mouth  RESPIRATORY:         No pleuritic pain, history of pleural effusions, hemoptysis, exertional dyspnea  CARDIOVASCULAR:             Denies chest pain, history of pericardial effusions  GASTRO:                    Denies heartburn, esophageal dysmotility, abdominal pain, nausea, vomiting, diarrhea, blood in the stool  HEMATOLOGIC:        No easy bruising, purpura, swollen lymph nodes  SKIN:                           Denies alopecia, ulcers, nodules, sun sensitivity, unexplained persistent rash   VASCULAR:                Denies edema, cyanosis, raynaud phenomenon  NEUROLOGIC:           Denies specific muscle weakness, paresthesias   PSYCHIATRIC:           No sleep disturbance / snoring, depression, anxiety  MSK:                           No morning stiffness >1 hour, SI joint pain, persistent joint swelling, persistent joint pain    PAST MEDICAL HISTORY    Past Medical History:   Diagnosis Date    Arthritis     hands/all over    Diabetes mellitus ([address] 6/15/2015    Dyspepsia and other specified disorders of function of stomach     Foot pain, bilateral 6/15/2015    Hemiparesis affecting left side as late effect of cerebrovascular accident ([address] 4/18/2014    Hypercholesteremia 4/18/2014    Hypertension     Knee pain, bilateral 6/15/2015    Lipoma of back 5/14/2015    Nausea & vomiting     Prediabetes 5/14/2015    S/P stroke due to cerebrovascular disease 4/18/2014    Screening for breast cancer 9/15/2015    Type II diabetes mellitus with nephropathy ([address] 7/23/2019        Past Surgical History:   Procedure Laterality Date    COLONOSCOPY N/A 10/17/2019    COLONOSCOPY performed by Bandar Agudelo MD at [facility] END[facility]COPY    HX OTHER SURGICAL      lipoma removed from back    [address] [place] [address]  2004 hernia repair       FAMILY HISTORY    Family History   Problem Relation Age of Onset    Hypertension Mother     Diabetes Mother     Arthritis-rheumatoid Father     Hypertension Brother     Stroke Brother        SOCIAL HISTORY    Social History     Tobacco Use    Smoking status: Former     Packs/day: 0.25     Years: 35.00     Pack years: 8.75     Types: Cigarettes     Quit date: 2009     Years since quittin.2    Smokeless tobacco: Never   Vaping Use    Vaping Use: Never used   Substance Use Topics    Alcohol use: No     Comment: ETOH abuse in past; quit     Drug use: No       MEDICATIONS    Current Outpatient Medications   Medication Sig Dispense Refill    metoprolol tartrate (LOPRESSOR) 25 mg tablet TAKE 1 TABLET BY MOUTH TWICE A DAY 60 Tablet 0    cyanocobalamin (Vitamin B-12) 1,000 mcg tablet Take 1 Tablet by mouth daily. 90 Tablet 3    lisinopril-hydroCHLOROthiazide (PRINZIDE, ZESTORETIC) 20-25 mg per tablet TAKE 1 TABLET BY MOUTH DAILY. PATIENT REQUIRES AN OFFICE VISIT FOR ADDITIONAL REFILLS. 90 Tablet 3    etodolac (LODINE) 400 mg tablet Take 1 Tablet by mouth two (2) times daily as needed for Pain. (Patient not taking: Reported on 6/15/2022) 40 Tablet 1    ergocalciferol (ERGOCALCIFEROL) 1,250 mcg (50,000 unit) capsule Take 1 Capsule by mouth every seven (7) days. Indications: vitamin D deficiency (high dose therapy) 12 Capsule 4    alendronate (FOSAMAX) 35 mg tablet Take 1 Tablet by mouth every seven (7) days. 4 Tablet 5    simvastatin (ZOCOR) 10 mg tablet TAKE 1 TABLET BY MOUTH EVERY NIGHT PATIENT REQUIRES AN OFFICE VISIT FOR ADDITIONAL REFILLS. 90 Tablet 1    cloNIDine HCL (CATAPRES) 0.2 mg tablet TAKE 1 TABLET BY MOUTH EVERY DAY AT NIGHT 90 Tablet 1    folic acid (FOLVITE) 1 mg tablet Take 1 Tab by mouth daily. (Patient not taking: Reported on 2022) 90 Tab 5    metFORMIN (GLUCOPHAGE) 500 mg tablet Take 1 Tab by mouth daily (with breakfast).  90 Tab 3    calcium carbonate-vitamin D3 (CALTRATE 600 PLUS D) 600 mg (1,500 mg)-800 unit chew Take 1 Tab by mouth daily. (Patient not taking: Reported on 5/5/2022) 90 Tab 3    aspirin 81 mg chewable tablet Take 1 Tab by mouth daily. 30 Tab 11       ALLERGIES    Allergies   Allergen Reactions    Codeine Nausea and Vomiting       PHYSICAL EXAMINATION  Visit Vitals  /84   Pulse 88   Temp 98.1 °F (36.7 °C)   Resp 18   Wt 108 lb (49 kg)   LMP  (LMP Unknown)   BMI 21.09 kg/m²     General:  The patient is well developed, well nourished, alert, and in no apparent distress. Eyes: Sclera are anicteric. No conjunctival injection. HEENT:  Oropharynx is clear. No oral ulcers. Adequate salivary pooling. No cervical or supraclavicular lymphadenopathy. Lungs:  Clear to auscultation bilaterally, without wheeze or stridor. Normal respiratory effort. Cor:  Regular rate and rhythm. No murmurs rubs or gallops. Abdomen: Soft, non-tender, without hepatomegaly or masses. Extremities: No calf tenderness or edema. Warm and well perfused. Skin:  No significant abnormalities. No petechial, purpuric, or psoriaform rashes. Neuro: No foot or wrist drop, stable post-CVA weakness right hand. Musculoskeletal:    A comprehensive musculoskeletal exam was performed for all joints of each upper and lower extremity and assessed for swelling, tenderness and range of motion. Results are documented as below: Limited to 15 degrees flexion deformity of R elbow. Synovitis and tenderness of bilateral wrists. Crepitus in left elbow with intact ROM. Interval resolved tenderness and synovitis of L wrist. Trace L first MCP synovitis and tenderness. Stable right MCP3-5 fixed flexion deformities to 90deg. Resolved synovitis and tenderness of R first MCP. Now resolved synovitis in L 2 and 3 MCP without tenderness. Stable bilateral knee crepitus without warmth or effusion. Bilateral left > right hammertoes.   Tenderness in right plantar arch anterior to calcaneus    DATA REVIEW    Labs:   8/2/22: CRP <0.29, , Cr 0.91, LFT WNL, WBC 8.4, HGB 10.9, Plt 292  5/5/22: Hgb A1c (POC) 6.4, Vit B12 215, QuantiFERON plus negative, ESR 35, Cr 0.68, LFT WNL, WBC 12.7, HGB 11.1, Plt 468, CRP 1 mg/L  11/3/21: CBC WNL, ESR 56; Cr 0.89, Tbili 0.4, AST 14, ALT 10, AlkP 122; CRP 3mg/L  3/2/21: CBC WNL, ESR 18, Cr 0.83, LFTs WNL, CRP <1mg/L  2/2020: Cbc, cmp unremarkable, lipids normal  12/2019: Cbc, cmp unremarkable  10/2019: Quant gold, HBV, HCV negative, RF, CCP positive, ESR 61, CRP, CMP normal  7/2019: cbc, CMP, TSH normal  5/14/15 Vit D 9.3    Imaging:   Hand xrays (11/3/21): On my review, extensive erosions and collapse of wrists and carpals. Evidence of mild osteoarthritic/osteopenic change involving the bones of the hand. Evidence of marked degenerative change/fusion of the carpal bones. Marked narrowing of the radiocarpal joint. Irregularity of the distal ulna. Hand x-rays (10/2019): Personally reviewed images. + severe erosive changes  Foot x-rays (10/2019): Personally reviewed images. + erosive and DJD changes    ASSESSMENT AND PLAN    A 74 y.o. female with hx of CVA with right sided weakness and flexion deformity of the hand; DM, seropositive erosive rheumatoid arthritis who has now low disease activity early into infliximab monotherapy with tapering prednisone. 1. Seropositive rheumatoid arthritis (HCC)  - Continue infliximab 5mg/kg every 8 weeks after loading dose  - Prednisone 5mg M/W/F x 14 days, then stop    2. Age-related osteoporosis without current pathological fracture  - DEXA BONE DENSITY STUDY AXIAL; Future  - C REACTIVE PROTEIN, QT; Standing  - CBC WITH AUTOMATED DIFF; Standing  - METABOLIC PANEL, COMPREHENSIVE; Standing  - SED RATE (ESR); Standing    3. Immunosuppression ([address]   - CBC WITH AUTOMATED DIFF; Standing  - METABOLIC PANEL, COMPREHENSIVE; Standing    4.  Plantar fasciitis of right foot  - Physical therapy for hand and foot      Patient Instructions   1) Continue to receive your Remicade infusions as scheduled. 2) Take 5mg of Prednisone every other day for 2 weeks and then stop completely. Let me know if you are feeling more stiffness and pain once you are done with Prednisone. 3) Stop your Fosamax. 4) Follow up with your physical therapy. 5) I am ordering you a bone density scan. Call [phone] to schedule. 6) We will check labs with your infusions. 7) Follow up in 3 months. Let me know if you have any questions or concerns in the meantime.      TODAY'S ORDERS    Orders Placed This Encounter    DEXA BONE DENSITY STUDY AXIAL    C REACTIVE PROTEIN, QT    CBC WITH AUTOMATED DIFF    METABOLIC PANEL, COMPREHENSIVE    SED RATE (ESR)         Future Appointments   Date Time Provider Kaur Herzog   9/6/2022 11:20 AM River Moreno MD [facility] BS AMB   10/3/2022 10:00 AM [address] [place] [address] INFUSION NURSE [address] [facility]   11/22/2022  9:40 AM Myles Argueta MD [facility] BS AMB                                                                                                                                                              Face to face time: 18 minutes  Note preparation and records review day of service: 15 minutes  Total provider time day of service: 33 minutes    This was scribed by Julian Short in the presence of Dr. Monalisa Hummel MD    Adult Rheumatology   [address] [address], [address] [place] [address], [address] [place] [address]   Phone [phone]  Fax [phone]

## 2022-09-28 DIAGNOSIS — I69.354 HEMIPARESIS AFFECTING LEFT SIDE AS LATE EFFECT OF CEREBROVASCULAR ACCIDENT (HCC): ICD-10-CM

## 2022-09-28 DIAGNOSIS — Z86.73 S/P STROKE DUE TO CEREBROVASCULAR DISEASE: ICD-10-CM

## 2022-09-28 DIAGNOSIS — E78.00 HYPERCHOLESTEREMIA: ICD-10-CM

## 2022-09-30 RX ORDER — CLONIDINE HYDROCHLORIDE 0.2 MG/1
TABLET ORAL
Qty: 90 TABLET | Refills: 1 | Status: SHIPPED | OUTPATIENT
Start: 2022-09-30

## 2022-09-30 RX ORDER — SIMVASTATIN 10 MG/1
TABLET, FILM COATED ORAL
Qty: 90 TABLET | Refills: 1 | Status: SHIPPED | OUTPATIENT
Start: 2022-09-30

## 2022-10-05 ENCOUNTER — HOSPITAL ENCOUNTER (OUTPATIENT)
Dept: INFUSION THERAPY | Age: 67
End: 2022-10-05

## 2022-10-05 RX ORDER — SODIUM CHLORIDE 9 MG/ML
5-250 INJECTION, SOLUTION INTRAVENOUS AS NEEDED
Status: CANCELLED | OUTPATIENT
Start: 2022-10-10

## 2022-10-05 RX ORDER — ACETAMINOPHEN 325 MG/1
650 TABLET ORAL ONCE
Status: CANCELLED
Start: 2022-10-10 | End: 2022-10-10

## 2022-10-05 RX ORDER — DIPHENHYDRAMINE HCL 25 MG
50 CAPSULE ORAL ONCE
Status: CANCELLED | OUTPATIENT
Start: 2022-10-10 | End: 2022-10-10

## 2022-10-06 DIAGNOSIS — M05.9 SEROPOSITIVE RHEUMATOID ARTHRITIS (HCC): Primary | ICD-10-CM

## 2022-10-06 RX ORDER — SODIUM CHLORIDE 9 MG/ML
5-250 INJECTION, SOLUTION INTRAVENOUS AS NEEDED
Status: CANCELLED | OUTPATIENT
Start: 2022-10-10

## 2022-10-06 RX ORDER — HYDROCORTISONE SODIUM SUCCINATE 100 MG/2ML
100 INJECTION, POWDER, FOR SOLUTION INTRAMUSCULAR; INTRAVENOUS AS NEEDED
Status: CANCELLED | OUTPATIENT
Start: 2022-10-10

## 2022-10-06 RX ORDER — DIPHENHYDRAMINE HYDROCHLORIDE 50 MG/ML
25 INJECTION, SOLUTION INTRAMUSCULAR; INTRAVENOUS AS NEEDED
Status: CANCELLED
Start: 2022-10-10

## 2022-10-06 RX ORDER — HEPARIN 100 UNIT/ML
500 SYRINGE INTRAVENOUS AS NEEDED
Status: CANCELLED
Start: 2022-10-10

## 2022-10-06 RX ORDER — ONDANSETRON 2 MG/ML
8 INJECTION INTRAMUSCULAR; INTRAVENOUS AS NEEDED
Status: CANCELLED | OUTPATIENT
Start: 2022-10-10

## 2022-10-06 RX ORDER — ACETAMINOPHEN 325 MG/1
650 TABLET ORAL ONCE
Status: CANCELLED
Start: 2022-10-10 | End: 2022-10-10

## 2022-10-06 RX ORDER — SODIUM CHLORIDE 0.9 % (FLUSH) 0.9 %
5-40 SYRINGE (ML) INJECTION AS NEEDED
Status: CANCELLED | OUTPATIENT
Start: 2022-10-10

## 2022-10-06 RX ORDER — DIPHENHYDRAMINE HYDROCHLORIDE 50 MG/ML
50 INJECTION, SOLUTION INTRAMUSCULAR; INTRAVENOUS AS NEEDED
Status: CANCELLED
Start: 2022-10-10

## 2022-10-06 RX ORDER — SODIUM CHLORIDE 9 MG/ML
5-40 INJECTION INTRAMUSCULAR; INTRAVENOUS; SUBCUTANEOUS AS NEEDED
Status: CANCELLED | OUTPATIENT
Start: 2022-10-10

## 2022-10-06 RX ORDER — ALBUTEROL SULFATE 0.83 MG/ML
2.5 SOLUTION RESPIRATORY (INHALATION) AS NEEDED
Status: CANCELLED
Start: 2022-10-10

## 2022-10-06 RX ORDER — ACETAMINOPHEN 325 MG/1
650 TABLET ORAL AS NEEDED
Status: CANCELLED
Start: 2022-10-10

## 2022-10-06 RX ORDER — EPINEPHRINE 1 MG/ML
0.3 INJECTION, SOLUTION, CONCENTRATE INTRAVENOUS AS NEEDED
Status: CANCELLED | OUTPATIENT
Start: 2022-10-10

## 2022-10-06 RX ORDER — DIPHENHYDRAMINE HCL 25 MG
50 CAPSULE ORAL ONCE
Status: CANCELLED | OUTPATIENT
Start: 2022-10-10 | End: 2022-10-10

## 2022-10-10 ENCOUNTER — HOSPITAL ENCOUNTER (OUTPATIENT)
Dept: INFUSION THERAPY | Age: 67
Discharge: HOME | End: 2022-10-10
Payer: MEDICARE

## 2022-10-10 VITALS
WEIGHT: 111 LBS | DIASTOLIC BLOOD PRESSURE: 81 MMHG | TEMPERATURE: 97.9 F | HEART RATE: 70 BPM | OXYGEN SATURATION: 100 % | SYSTOLIC BLOOD PRESSURE: 135 MMHG | RESPIRATION RATE: 18 BRPM | HEIGHT: 60 IN | BODY MASS INDEX: 21.79 KG/M2

## 2022-10-10 DIAGNOSIS — M05.9 SEROPOSITIVE RHEUMATOID ARTHRITIS (HCC): Primary | ICD-10-CM

## 2022-10-10 LAB
ALBUMIN SERPL-MCNC: 3.1 G/DL (ref 3.5–5)
ALBUMIN/GLOB SERPL: 0.8 {RATIO} (ref 1.1–2.2)
ALP SERPL-CCNC: 69 U/L (ref 45–117)
ALT SERPL-CCNC: 27 U/L (ref 12–78)
ANION GAP SERPL CALC-SCNC: 6 MMOL/L (ref 5–15)
AST SERPL-CCNC: 46 U/L (ref 15–37)
BASOPHILS # BLD: 0 K/UL (ref 0–0.1)
BASOPHILS NFR BLD: 0 % (ref 0–1)
BILIRUB SERPL-MCNC: 0.5 MG/DL (ref 0.2–1)
BUN SERPL-MCNC: 22 MG/DL (ref 6–20)
BUN/CREAT SERPL: 26 (ref 12–20)
CALCIUM SERPL-MCNC: 8.2 MG/DL (ref 8.5–10.1)
CHLORIDE SERPL-SCNC: 102 MMOL/L (ref 97–108)
CO2 SERPL-SCNC: 30 MMOL/L (ref 21–32)
CREAT SERPL-MCNC: 0.86 MG/DL (ref 0.55–1.02)
CRP SERPL-MCNC: <0.29 MG/DL (ref 0–0.6)
DIFFERENTIAL METHOD BLD: ABNORMAL
EOSINOPHIL # BLD: 0.1 K/UL (ref 0–0.4)
EOSINOPHIL NFR BLD: 1 % (ref 0–7)
ERYTHROCYTE [DISTWIDTH] IN BLOOD BY AUTOMATED COUNT: 13.7 % (ref 11.5–14.5)
ERYTHROCYTE [SEDIMENTATION RATE] IN BLOOD: 37 MM/HR (ref 0–30)
GLOBULIN SER CALC-MCNC: 4 G/DL (ref 2–4)
GLUCOSE SERPL-MCNC: 128 MG/DL (ref 65–100)
HCT VFR BLD AUTO: 34.6 % (ref 35–47)
HGB BLD-MCNC: 11.1 G/DL (ref 11.5–16)
IMM GRANULOCYTES # BLD AUTO: 0 K/UL (ref 0–0.04)
IMM GRANULOCYTES NFR BLD AUTO: 0 % (ref 0–0.5)
LYMPHOCYTES # BLD: 2.7 K/UL (ref 0.8–3.5)
LYMPHOCYTES NFR BLD: 34 % (ref 12–49)
MCH RBC QN AUTO: 29.8 PG (ref 26–34)
MCHC RBC AUTO-ENTMCNC: 32.1 G/DL (ref 30–36.5)
MCV RBC AUTO: 93 FL (ref 80–99)
MONOCYTES # BLD: 0.4 K/UL (ref 0–1)
MONOCYTES NFR BLD: 5 % (ref 5–13)
NEUTS SEG # BLD: 4.6 K/UL (ref 1.8–8)
NEUTS SEG NFR BLD: 60 % (ref 32–75)
NRBC # BLD: 0 K/UL (ref 0–0.01)
NRBC BLD-RTO: 0 PER 100 WBC
PLATELET # BLD AUTO: 279 K/UL (ref 150–400)
PMV BLD AUTO: 10.2 FL (ref 8.9–12.9)
POTASSIUM SERPL-SCNC: 5.7 MMOL/L (ref 3.5–5.1)
PROT SERPL-MCNC: 7.1 G/DL (ref 6.4–8.2)
RBC # BLD AUTO: 3.72 M/UL (ref 3.8–5.2)
SODIUM SERPL-SCNC: 138 MMOL/L (ref 136–145)
WBC # BLD AUTO: 7.9 K/UL (ref 3.6–11)

## 2022-10-10 PROCEDURE — 74011000250 HC RX REV CODE- 250: Performed by: INTERNAL MEDICINE

## 2022-10-10 PROCEDURE — 74011250636 HC RX REV CODE- 250/636: Performed by: INTERNAL MEDICINE

## 2022-10-10 PROCEDURE — 85025 COMPLETE CBC W/AUTO DIFF WBC: CPT

## 2022-10-10 PROCEDURE — 96365 THER/PROPH/DIAG IV INF INIT: CPT

## 2022-10-10 PROCEDURE — 85652 RBC SED RATE AUTOMATED: CPT

## 2022-10-10 PROCEDURE — 80053 COMPREHEN METABOLIC PANEL: CPT

## 2022-10-10 PROCEDURE — 86140 C-REACTIVE PROTEIN: CPT

## 2022-10-10 PROCEDURE — 74011250637 HC RX REV CODE- 250/637: Performed by: INTERNAL MEDICINE

## 2022-10-10 PROCEDURE — 96366 THER/PROPH/DIAG IV INF ADDON: CPT

## 2022-10-10 PROCEDURE — 36415 COLL VENOUS BLD VENIPUNCTURE: CPT

## 2022-10-10 RX ORDER — SODIUM CHLORIDE 9 MG/ML
5-40 INJECTION INTRAMUSCULAR; INTRAVENOUS; SUBCUTANEOUS AS NEEDED
OUTPATIENT
Start: 2022-12-04

## 2022-10-10 RX ORDER — SODIUM CHLORIDE 0.9 % (FLUSH) 0.9 %
5-40 SYRINGE (ML) INJECTION AS NEEDED
Status: DISPENSED | OUTPATIENT
Start: 2022-10-10 | End: 2022-10-10

## 2022-10-10 RX ORDER — SODIUM CHLORIDE 9 MG/ML
5-250 INJECTION, SOLUTION INTRAVENOUS AS NEEDED
OUTPATIENT
Start: 2022-12-04

## 2022-10-10 RX ORDER — HEPARIN 100 UNIT/ML
500 SYRINGE INTRAVENOUS AS NEEDED
Start: 2022-12-04

## 2022-10-10 RX ORDER — DIPHENHYDRAMINE HCL 25 MG
50 CAPSULE ORAL ONCE
OUTPATIENT
Start: 2022-12-04 | End: 2022-12-04

## 2022-10-10 RX ORDER — ONDANSETRON 2 MG/ML
8 INJECTION INTRAMUSCULAR; INTRAVENOUS AS NEEDED
OUTPATIENT
Start: 2022-12-04

## 2022-10-10 RX ORDER — DIPHENHYDRAMINE HYDROCHLORIDE 50 MG/ML
50 INJECTION, SOLUTION INTRAMUSCULAR; INTRAVENOUS AS NEEDED
Start: 2022-12-04

## 2022-10-10 RX ORDER — DIPHENHYDRAMINE HYDROCHLORIDE 50 MG/ML
25 INJECTION, SOLUTION INTRAMUSCULAR; INTRAVENOUS AS NEEDED
Start: 2022-12-04

## 2022-10-10 RX ORDER — ACETAMINOPHEN 325 MG/1
650 TABLET ORAL ONCE
Start: 2022-12-04 | End: 2022-12-04

## 2022-10-10 RX ORDER — HYDROCORTISONE SODIUM SUCCINATE 100 MG/2ML
100 INJECTION, POWDER, FOR SOLUTION INTRAMUSCULAR; INTRAVENOUS AS NEEDED
OUTPATIENT
Start: 2022-12-04

## 2022-10-10 RX ORDER — ALBUTEROL SULFATE 0.83 MG/ML
2.5 SOLUTION RESPIRATORY (INHALATION) AS NEEDED
Start: 2022-12-04

## 2022-10-10 RX ORDER — EPINEPHRINE 1 MG/ML
0.3 INJECTION, SOLUTION, CONCENTRATE INTRAVENOUS AS NEEDED
OUTPATIENT
Start: 2022-12-04

## 2022-10-10 RX ORDER — SODIUM CHLORIDE 9 MG/ML
5-250 INJECTION, SOLUTION INTRAVENOUS AS NEEDED
Status: DISPENSED | OUTPATIENT
Start: 2022-10-10 | End: 2022-10-10

## 2022-10-10 RX ORDER — DIPHENHYDRAMINE HCL 25 MG
50 CAPSULE ORAL ONCE
Status: COMPLETED | OUTPATIENT
Start: 2022-10-10 | End: 2022-10-10

## 2022-10-10 RX ORDER — ACETAMINOPHEN 325 MG/1
650 TABLET ORAL ONCE
Status: COMPLETED | OUTPATIENT
Start: 2022-10-10 | End: 2022-10-10

## 2022-10-10 RX ORDER — ACETAMINOPHEN 325 MG/1
650 TABLET ORAL AS NEEDED
Start: 2022-12-04

## 2022-10-10 RX ORDER — SODIUM CHLORIDE 0.9 % (FLUSH) 0.9 %
5-40 SYRINGE (ML) INJECTION AS NEEDED
OUTPATIENT
Start: 2022-12-04

## 2022-10-10 RX ADMIN — Medication 10 ML: at 10:19

## 2022-10-10 RX ADMIN — ACETAMINOPHEN 650 MG: 325 TABLET ORAL at 10:28

## 2022-10-10 RX ADMIN — SODIUM CHLORIDE 25 ML/HR: 9 INJECTION, SOLUTION INTRAVENOUS at 11:05

## 2022-10-10 RX ADMIN — SODIUM CHLORIDE 200 MG: 900 INJECTION, SOLUTION INTRAVENOUS at 11:07

## 2022-10-10 RX ADMIN — DIPHENHYDRAMINE HYDROCHLORIDE 50 MG: 25 CAPSULE ORAL at 10:28

## 2022-10-10 NOTE — DISCHARGE INSTRUCTIONS
Infliximab-abda (By injection)   Infliximab-abda (in-FLIX-i-mab - abda)  Treats rheumatoid arthritis, psoriatic arthritis, ankylosing spondylitis, Crohn disease, plaque psoriasis, and ulcerative colitis. Brand Name(s): Renflexis   There may be other brand names for this medicine. When This Medicine Should Not Be Used: This medicine is not right for everyone. You should not receive it if you had an allergic reaction to infliximab products or murine (mouse) proteins. How to Use This Medicine:   Injectable  Your doctor will prescribe your dose and schedule. This medicine is given through a needle placed in a vein. This medicine needs to be given slowly. The needle will remain in place for at least 2 hours. A nurse or other health provider will give you this medicine. This medicine should come with a Medication Guide. Ask your pharmacist for a copy if you do not have one. Drugs and Foods to Avoid:   Ask your doctor or pharmacist before using any other medicine, including over-the-counter medicines, vitamins, and herbal products. Some foods and medicines may affect how infliximab-abda works. Tell your doctor if you are using any of the following:  Abatacept, anakinra, cyclosporine, etanercept, theophylline, tocilizumab  Blood thinner (including warfarin)  Medicine that weakens immune system (including methotrexate or a steroid)  Tell your doctor if you have had light treatment for psoriasis. You should not receive a vaccine without your doctor's approval. A live virus vaccine could cause an infection. Children should be current on all vaccines before they start treatment with this medicine. If you received infliximab-abda while pregnant, tell the baby's doctor. Warnings While Using This Medicine:   Tell your doctor if you are pregnant or breastfeeding, or if you have heart failure, COPD, liver disease, a bleeding disorder, blood or bone marrow problems, cancer, or heart disease.  Tell your doctor if you have a history of seizures, multiple sclerosis, Guillain-Barré syndrome, or a similar nervous system disease. This medicine may cause the following problems:  Higher risk of lymphoma or other cancers (including skin cancer)  Liver damage  Infusion reaction during or after the infusion, or if you start using the medicine again after not receiving it for a long time  Lupus-like syndrome  This medicine may make you bleed, bruise, or get infections more easily. Take precautions to prevent illness and injury. Wash your hands often. This medicine increases your risk of infection, which could result in serious or life-threatening illness. Tell your doctor if you have a history of frequent or serious infections, including tuberculosis or hepatitis B. Make sure your doctor knows if have an infection or already have trouble with your immune system. This risk may be higher in people who are older than 65 years or who have diabetes. Avoid sick people, and wash your hands often. Your doctor will check your progress and the effects of this medicine at regular visits. Keep all appointments. Possible Side Effects While Using This Medicine:   Call your doctor right away if you notice any of these side effects:   Allergic reaction: Itching or hives, swelling in your face or hands, swelling or tingling in your mouth or throat, chest tightness, trouble breathing  Dark urine or pale stools, nausea, vomiting, loss of appetite, stomach pain, yellow skin or eyes  Fever, chills, cough, runny or stuffy nose, sore throat, body aches  Joint or muscle pain or swelling, trouble swallowing  Headache, lightheadedness, trouble breathing, rash  Seizures, numbness, tingling, problems with vision, speech, or walking  Unusual bleeding, bruising, tiredness, or weakness  Warm, red, swollen, or painful skin, blisters, skin sores  If you notice these less serious side effects, talk with your doctor:   Redness, pain, or swelling where the needle is placed  If you notice other side effects that you think are caused by this medicine, tell your doctor. Call your doctor for medical advice about side effects. You may report side effects to FDA at 4-558-JVK-2883  © 2017 [facility] Information is for End User's use only and may not be sold, redistributed or otherwise used for commercial purposes. The above information is an  only. It is not intended as medical advice for individual conditions or treatments. Talk to your doctor, nurse or pharmacist before following any medical regimen to see if it is safe and effective for you.

## 2022-10-10 NOTE — PROGRESS NOTES
[facility] Short Note                       Date: October 10, 2022    Name: Hermes Linder    MRN: [id number]         : 1955    Treatment: Elsie Cutler COVID-19 SCREENING      The patient was asked the following questions and answered as documented below:    Do you have any symptoms of COVID-19? SOB, coughing, fever, or generally not feeling well? NO  Have you been exposed to COVID-19 recently? NO  Have you had any recent contact with family/friend that has a pending COVID test? NO      Follow Up: Proceed with treatment    Ms. Rafy Spicer was assessed and education was provided. Problem: Patient Education:  Go to Education Activity  Goal: Patient/Family Education  Outcome: Progressing Towards Goal     Problem: Knowledge Deficit  Goal: *Verbalizes understanding of procedures and medications  Outcome: Progressing Towards Goal    Lines: 24 gauge IV placed to the RAC, labs drawn and processed  Peripheral IV 10/10/22 Right Antecubital (Active)   Site Assessment Clean, dry, & intact 10/10/22 1018   Phlebitis Assessment 0 10/10/22 1018   Infiltration Assessment 0 10/10/22 1018   Dressing Status Clean, dry, & intact;New;Occlusive 10/10/22 1018   Dressing Type Transparent 10/10/22 1018   Hub Color/Line Status Yellow; Flushed;Patent 10/10/22 1018   Action Taken Blood drawn 10/10/22 1018        Ms. Ngo vitals were reviewed prior to and after treatment. Patient Vitals for the past 12 hrs:   Temp Pulse Resp BP SpO2   10/10/22 1319 -- 70 -- 135/81 --   10/10/22 1002 97.9 °F (36.6 °C) 67 18 111/71 100 %         Lab results were obtained and reviewed.   Recent Results (from the past 12 hour(s))   METABOLIC PANEL, COMPREHENSIVE    Collection Time: 10/10/22 10:10 AM   Result Value Ref Range    Sodium 138 136 - 145 mmol/L    Potassium 5.7 (H) 3.5 - 5.1 mmol/L    Chloride 102 97 - 108 mmol/L    CO2 30 21 - 32 mmol/L    Anion gap 6 5 - 15 mmol/L    Glucose 128 (H) 65 - 100 mg/dL    BUN 22 (H) 6 - 20 MG/DL Creatinine 0.86 0.55 - 1.02 MG/DL    BUN/Creatinine ratio 26 (H) 12 - 20      eGFR >60 >60 ml/min/1.73m2    Calcium 8.2 (L) 8.5 - 10.1 MG/DL    Bilirubin, total 0.5 0.2 - 1.0 MG/DL    ALT (SGPT) 27 12 - 78 U/L    AST (SGOT) 46 (H) 15 - 37 U/L    Alk. phosphatase 69 45 - 117 U/L    Protein, total 7.1 6.4 - 8.2 g/dL    Albumin 3.1 (L) 3.5 - 5.0 g/dL    Globulin 4.0 2.0 - 4.0 g/dL    A-G Ratio 0.8 (L) 1.1 - 2.2     CBC WITH AUTOMATED DIFF    Collection Time: 10/10/22 10:10 AM   Result Value Ref Range    WBC 7.9 3.6 - 11.0 K/uL    RBC 3.72 (L) 3.80 - 5.20 M/uL    HGB 11.1 (L) 11.5 - 16.0 g/dL    HCT 34.6 (L) 35.0 - 47.0 %    MCV 93.0 80.0 - 99.0 FL    MCH 29.8 26.0 - 34.0 PG    MCHC 32.1 30.0 - 36.5 g/dL    RDW 13.7 11.5 - 14.5 %    PLATELET 874 614 - 415 K/uL    MPV 10.2 8.9 - 12.9 FL    NRBC 0.0 0  WBC    ABSOLUTE NRBC 0.00 0.00 - 0.01 K/uL    NEUTROPHILS 60 32 - 75 %    LYMPHOCYTES 34 12 - 49 %    MONOCYTES 5 5 - 13 %    EOSINOPHILS 1 0 - 7 %    BASOPHILS 0 0 - 1 %    IMMATURE GRANULOCYTES 0 0.0 - 0.5 %    ABS. NEUTROPHILS 4.6 1.8 - 8.0 K/UL    ABS. LYMPHOCYTES 2.7 0.8 - 3.5 K/UL    ABS. MONOCYTES 0.4 0.0 - 1.0 K/UL    ABS. EOSINOPHILS 0.1 0.0 - 0.4 K/UL    ABS. BASOPHILS 0.0 0.0 - 0.1 K/UL    ABS. IMM.  GRANS. 0.0 0.00 - 0.04 K/UL    DF AUTOMATED     SED RATE (ESR)    Collection Time: 10/10/22 10:10 AM   Result Value Ref Range    Sed rate, automated 37 (H) 0 - 30 mm/hr       Medications given:  Medications Administered       0.9% sodium chloride infusion       Admin Date  10/10/2022 Action  New Bag Dose  25 mL/hr Rate  25 mL/hr Route  IntraVENous Administered By  Prtelma Marte RN              acetaminophen (TYLENOL) tablet 650 mg       Admin Date  10/10/2022 Action  Given Dose  650 mg Route  Oral Administered By  Micheline Marte RN              diphenhydrAMINE (BENADRYL) capsule 50 mg       Admin Date  10/10/2022 Action  Given Dose  50 mg Route  Oral Administered By  Micheline Marte RN inFLIXimab-dyyb (INFLECTRA) 200 mg in 0.9% sodium chloride 250 mL, overfill volume 25 mL infusion       Admin Date  10/10/2022 Action  New Bag Dose  200 mg Route  IntraVENous Administered By  Shira Lemus, KELLY              sodium chloride (NS) flush 5-40 mL       Admin Date  10/10/2022 Action  Given Dose  10 mL Route  IntraVENous Administered By  Shira Lemus RN                    Ms. Mario Carreno tolerated the treatment without complaints. IV flushed and removed    Ms. Mario Carreno was discharged from [place] [address] in stable condition at 1325.       Patient provided with AVS , which includes future appointment and written educational material.     Future Appointments   Date Time Provider Kaur Herzog   10/20/2022  3:00 PM Doris Galarza MD [facility] BS AMB   11/22/2022  9:40 AM Jessica Jasso MD [facility] BS AMB   12/9/2022  9:00 AM [facility] INFUSION NURSE 1 [facility]   2/9/2023  9:00 AM [facility] INFUSION NURSE 1 [address] [place] [address]       Jessica Baker RN  October 10, 2022  11:16 AM

## 2022-10-18 DIAGNOSIS — I10 HTN, GOAL BELOW 130/80: ICD-10-CM

## 2022-10-18 RX ORDER — METOPROLOL TARTRATE 25 MG/1
TABLET, FILM COATED ORAL
Qty: 60 TABLET | Refills: 0 | Status: SHIPPED | OUTPATIENT
Start: 2022-10-18

## 2022-10-20 ENCOUNTER — OFFICE VISIT (OUTPATIENT)
Dept: FAMILY MEDICINE CLINIC | Age: 67
End: 2022-10-20
Payer: MEDICARE

## 2022-10-20 VITALS
HEART RATE: 99 BPM | OXYGEN SATURATION: 99 % | TEMPERATURE: 97.8 F | SYSTOLIC BLOOD PRESSURE: 128 MMHG | BODY MASS INDEX: 21.87 KG/M2 | HEIGHT: 60 IN | WEIGHT: 111.4 LBS | RESPIRATION RATE: 16 BRPM | DIASTOLIC BLOOD PRESSURE: 84 MMHG

## 2022-10-20 DIAGNOSIS — Z87.891 PERSONAL HISTORY OF TOBACCO USE, PRESENTING HAZARDS TO HEALTH: ICD-10-CM

## 2022-10-20 DIAGNOSIS — Z78.0 POSTMENOPAUSAL STATE: ICD-10-CM

## 2022-10-20 DIAGNOSIS — Z00.00 MEDICARE ANNUAL WELLNESS VISIT, SUBSEQUENT: Primary | ICD-10-CM

## 2022-10-20 DIAGNOSIS — Z23 NEEDS FLU SHOT: ICD-10-CM

## 2022-10-20 DIAGNOSIS — Z71.89 ADVANCED DIRECTIVES, COUNSELING/DISCUSSION: ICD-10-CM

## 2022-10-20 DIAGNOSIS — Z12.31 ENCOUNTER FOR SCREENING MAMMOGRAM FOR MALIGNANT NEOPLASM OF BREAST: ICD-10-CM

## 2022-10-20 DIAGNOSIS — R53.83 LOW ENERGY: ICD-10-CM

## 2022-10-20 DIAGNOSIS — E78.2 MIXED HYPERLIPIDEMIA: ICD-10-CM

## 2022-10-20 DIAGNOSIS — I63.9 CEREBROVASCULAR ACCIDENT (CVA), UNSPECIFIED MECHANISM (HCC): ICD-10-CM

## 2022-10-20 DIAGNOSIS — K51.018 ULCERATIVE PANCOLITIS WITH OTHER COMPLICATION (HCC): ICD-10-CM

## 2022-10-20 DIAGNOSIS — Z23 ENCOUNTER FOR IMMUNIZATION: ICD-10-CM

## 2022-10-20 DIAGNOSIS — E11.8 CONTROLLED DIABETES MELLITUS TYPE 2 WITH COMPLICATIONS, UNSPECIFIED WHETHER LONG TERM INSULIN USE (HCC): ICD-10-CM

## 2022-10-20 PROCEDURE — G8399 PT W/DXA RESULTS DOCUMENT: HCPCS | Performed by: FAMILY MEDICINE

## 2022-10-20 PROCEDURE — G8420 CALC BMI NORM PARAMETERS: HCPCS | Performed by: FAMILY MEDICINE

## 2022-10-20 PROCEDURE — 1101F PT FALLS ASSESS-DOCD LE1/YR: CPT | Performed by: FAMILY MEDICINE

## 2022-10-20 PROCEDURE — 3074F SYST BP LT 130 MM HG: CPT | Performed by: FAMILY MEDICINE

## 2022-10-20 PROCEDURE — 3017F COLORECTAL CA SCREEN DOC REV: CPT | Performed by: FAMILY MEDICINE

## 2022-10-20 PROCEDURE — 3052F HG A1C>EQUAL 8.0%<EQUAL 9.0%: CPT | Performed by: FAMILY MEDICINE

## 2022-10-20 PROCEDURE — G8752 SYS BP LESS 140: HCPCS | Performed by: FAMILY MEDICINE

## 2022-10-20 PROCEDURE — G8427 DOCREV CUR MEDS BY ELIG CLIN: HCPCS | Performed by: FAMILY MEDICINE

## 2022-10-20 PROCEDURE — G0439 PPPS, SUBSEQ VISIT: HCPCS | Performed by: FAMILY MEDICINE

## 2022-10-20 PROCEDURE — 1090F PRES/ABSN URINE INCON ASSESS: CPT | Performed by: FAMILY MEDICINE

## 2022-10-20 PROCEDURE — 2022F DILAT RTA XM EVC RTNOPTHY: CPT | Performed by: FAMILY MEDICINE

## 2022-10-20 PROCEDURE — G9899 SCRN MAM PERF RSLTS DOC: HCPCS | Performed by: FAMILY MEDICINE

## 2022-10-20 PROCEDURE — G8754 DIAS BP LESS 90: HCPCS | Performed by: FAMILY MEDICINE

## 2022-10-20 PROCEDURE — 1123F ACP DISCUSS/DSCN MKR DOCD: CPT | Performed by: FAMILY MEDICINE

## 2022-10-20 PROCEDURE — 99497 ADVNCD CARE PLAN 30 MIN: CPT | Performed by: FAMILY MEDICINE

## 2022-10-20 PROCEDURE — G8536 NO DOC ELDER MAL SCRN: HCPCS | Performed by: FAMILY MEDICINE

## 2022-10-20 PROCEDURE — 3078F DIAST BP <80 MM HG: CPT | Performed by: FAMILY MEDICINE

## 2022-10-20 PROCEDURE — G0008 ADMIN INFLUENZA VIRUS VAC: HCPCS | Performed by: FAMILY MEDICINE

## 2022-10-20 PROCEDURE — G8510 SCR DEP NEG, NO PLAN REQD: HCPCS | Performed by: FAMILY MEDICINE

## 2022-10-20 PROCEDURE — 90694 VACC AIIV4 NO PRSRV 0.5ML IM: CPT | Performed by: FAMILY MEDICINE

## 2022-10-20 PROCEDURE — 99204 OFFICE O/P NEW MOD 45 MIN: CPT | Performed by: FAMILY MEDICINE

## 2022-10-20 RX ORDER — PREDNISONE 10 MG/1
TABLET ORAL
COMMUNITY
Start: 2022-05-18

## 2022-10-20 RX ORDER — INFLIXIMAB 100 MG/10ML
INJECTION, POWDER, LYOPHILIZED, FOR SOLUTION INTRAVENOUS
COMMUNITY

## 2022-10-20 NOTE — PROGRESS NOTES
Chief Complaint   Patient presents with    Diabetes     Chief Complaint   Patient presents with    Diabetes       1. \"Have you been to the ER, urgent care clinic since your last visit? Hospitalized since your last visit? \" No    2. \"Have you seen or consulted any other health care providers outside of the [address] [place] [address] since your last visit? \" No     3. For patients aged 39-70: Has the patient had a colonoscopy / FIT/ Cologuard? No      If the patient is female:    4. For patients aged 41-77: Has the patient had a mammogram within the past 2 years? No      5. For patients aged 21-65: Has the patient had a pap smear? No    Health Maintenance Due   Topic Date Due    COVID-19 Vaccine (1) Never done    Pneumococcal 65+ years (1 - PCV) Never done    Eye Exam Retinal or Dilated  Never done    Shingrix Vaccine Age 50> (1 of 2) Never done    Foot Exam Q1  08/16/2017    MICROALBUMIN Q1  07/16/2020    Flu Vaccine (1) 08/01/2022    Medicare Yearly Exam  08/12/2022    Lipid Screen  11/03/2022     verified patient with two type of identifiers. wero c/o at present     After obtaining consent, and per orders of ,flu vaccine  given to left deltoid IM   . Patient instructed to remain in clinic for 15 minutes afterwards, and to report any adverse reaction to me immediately.  Patient did not have any adverse reactions during this office visit

## 2022-10-20 NOTE — LETTER
10/30/2022    Ms. Grayson [facility] [id number]      Dear Grace Hooper:    Please find your most recent results below. Recent Results (from the past 672 hour(s))   METABOLIC PANEL, COMPREHENSIVE    Collection Time: 10/10/22 10:10 AM   Result Value Ref Range    Sodium 138 136 - 145 mmol/L    Potassium 5.7 (H) 3.5 - 5.1 mmol/L    Chloride 102 97 - 108 mmol/L    CO2 30 21 - 32 mmol/L    Anion gap 6 5 - 15 mmol/L    Glucose 128 (H) 65 - 100 mg/dL    BUN 22 (H) 6 - 20 MG/DL    Creatinine 0.86 0.55 - 1.02 MG/DL    BUN/Creatinine ratio 26 (H) 12 - 20      eGFR >60 >60 ml/min/1.73m2    Calcium 8.2 (L) 8.5 - 10.1 MG/DL    Bilirubin, total 0.5 0.2 - 1.0 MG/DL    ALT (SGPT) 27 12 - 78 U/L    AST (SGOT) 46 (H) 15 - 37 U/L    Alk. phosphatase 69 45 - 117 U/L    Protein, total 7.1 6.4 - 8.2 g/dL    Albumin 3.1 (L) 3.5 - 5.0 g/dL    Globulin 4.0 2.0 - 4.0 g/dL    A-G Ratio 0.8 (L) 1.1 - 2.2     CBC WITH AUTOMATED DIFF    Collection Time: 10/10/22 10:10 AM   Result Value Ref Range    WBC 7.9 3.6 - 11.0 K/uL    RBC 3.72 (L) 3.80 - 5.20 M/uL    HGB 11.1 (L) 11.5 - 16.0 g/dL    HCT 34.6 (L) 35.0 - 47.0 %    MCV 93.0 80.0 - 99.0 FL    MCH 29.8 26.0 - 34.0 PG    MCHC 32.1 30.0 - 36.5 g/dL    RDW 13.7 11.5 - 14.5 %    PLATELET 954 589 - 777 K/uL    MPV 10.2 8.9 - 12.9 FL    NRBC 0.0 0  WBC    ABSOLUTE NRBC 0.00 0.00 - 0.01 K/uL    NEUTROPHILS 60 32 - 75 %    LYMPHOCYTES 34 12 - 49 %    MONOCYTES 5 5 - 13 %    EOSINOPHILS 1 0 - 7 %    BASOPHILS 0 0 - 1 %    IMMATURE GRANULOCYTES 0 0.0 - 0.5 %    ABS. NEUTROPHILS 4.6 1.8 - 8.0 K/UL    ABS. LYMPHOCYTES 2.7 0.8 - 3.5 K/UL    ABS. MONOCYTES 0.4 0.0 - 1.0 K/UL    ABS. EOSINOPHILS 0.1 0.0 - 0.4 K/UL    ABS. BASOPHILS 0.0 0.0 - 0.1 K/UL    ABS. IMM.  GRANS. 0.0 0.00 - 0.04 K/UL    DF AUTOMATED     C REACTIVE PROTEIN, QT    Collection Time: 10/10/22 10:10 AM   Result Value Ref Range    C-Reactive protein <0.29 0.00 - 0.60 mg/dL   SED RATE (ESR) Collection Time: 10/10/22 10:10 AM   Result Value Ref Range    Sed rate, automated 37 (H) 0 - 30 mm/hr   CBC W/O DIFF    Collection Time: 10/20/22  3:50 PM   Result Value Ref Range    WBC 13.6 (H) 3.4 - 10.8 x10E3/uL    RBC 4.10 3.77 - 5.28 x10E6/uL    HGB 12.3 11.1 - 15.9 g/dL    HCT 37.1 34.0 - 46.6 %    MCV 91 79 - 97 fL    MCH 30.0 26.6 - 33.0 pg    MCHC 33.2 31.5 - 35.7 g/dL    RDW 13.3 11.7 - 15.4 %    PLATELET 324 399 - 848 F89G1/PC   METABOLIC PANEL, COMPREHENSIVE    Collection Time: 10/20/22  3:50 PM   Result Value Ref Range    Glucose 162 (H) 70 - 99 mg/dL    BUN 25 8 - 27 mg/dL    Creatinine 0.86 0.57 - 1.00 mg/dL    eGFR 75 >59 mL/min/1.73    BUN/Creatinine ratio 29 (H) 12 - 28    Sodium 140 134 - 144 mmol/L    Potassium 4.0 3.5 - 5.2 mmol/L    Chloride 101 96 - 106 mmol/L    CO2 23 20 - 29 mmol/L    Calcium 9.3 8.7 - 10.3 mg/dL    Protein, total 7.1 6.0 - 8.5 g/dL    Albumin 4.4 3.8 - 4.8 g/dL    GLOBULIN, TOTAL 2.7 1.5 - 4.5 g/dL    A-G Ratio 1.6 1.2 - 2.2    Bilirubin, total 0.7 0.0 - 1.2 mg/dL    Alk.  phosphatase 78 44 - 121 IU/L    AST (SGOT) 18 0 - 40 IU/L    ALT (SGPT) 15 0 - 32 IU/L   FERRITIN    Collection Time: 10/20/22  3:50 PM   Result Value Ref Range    Ferritin 86 15 - 150 ng/mL   LIPID PANEL    Collection Time: 10/20/22  3:50 PM   Result Value Ref Range    Cholesterol, total 195 100 - 199 mg/dL    Triglyceride 66 0 - 149 mg/dL    HDL Cholesterol 67 >39 mg/dL    VLDL, calculated 12 5 - 40 mg/dL    LDL, calculated 116 (H) 0 - 99 mg/dL   HEMOGLOBIN A1C WITH EAG    Collection Time: 10/20/22  3:50 PM   Result Value Ref Range    Hemoglobin A1c 8.0 (H) 4.8 - 5.6 %    Estimated average glucose 183 mg/dL   TSH 3RD GENERATION    Collection Time: 10/20/22  3:50 PM   Result Value Ref Range    TSH 0.957 0.450 - 4.500 uIU/mL   URINALYSIS W/ RFLX MICROSCOPIC    Collection Time: 10/27/22 11:30 AM   Result Value Ref Range    Specific Gravity 1.020 1.005 - 1.030    pH (UA) 5.5 5.0 - 7.5    Color Yellow Yellow    Appearance Cloudy (A) Clear    Leukocyte Esterase Negative Negative    Protein Negative Negative/Trace    Glucose Negative Negative    Ketone Negative Negative    Blood Negative Negative    Bilirubin Negative Negative    Urobilinogen 0.2 0.2 - 1.0 mg/dL    Nitrites Negative Negative    Microscopic Examination Comment          RECOMMENDATIONS:    Normal test results except for sugar level into the UNcontrolled diabetic state, please increase metformin from 1 tablet of 500 mg once daily to 1 tablet 500 mg twice daily increase oral hydration return to clinic in 6 to 8 weeks for repeat abnormal test results please be compliant with the following steps for improving diabetic care and outcome for further care to prevent further complications    Keep up the good work! Work on diet and exercise. Continue with current  diet and medications.               Please call me if you have any questions: [phone]    Sincerely,      Jovanna Mari MD

## 2022-10-20 NOTE — ACP (ADVANCE CARE PLANNING)
Advance Care Planning     Advance Care Planning (ACP) Physician/NP/PA Conversation      Date of Conversation: 10/20/2022        Conversation Conducted with:   Patient with Decision Making Capacity, stating that the Other Legally Authorized Decision Maker would be  Daughter Marlys Matthews as SDM          Patient is on presence of no family member,, stated that the pt wants to be not DNR at this time,  The pt likes to be a full code individual,  The patient states that there is a lot of will to live,      Conversation Outcomes / Follow-Up Plan:   Completed new Advance Directive      Length of ACP Conversation in minutes:  12 minutes          Filomena Kim MD

## 2022-10-20 NOTE — PROGRESS NOTES
This is the Subsequent Medicare Annual Wellness Exam, performed 12 months or more after the Initial AWV or the last Subsequent AWV    I have reviewed the patient's medical history in detail and updated the computerized patient record. Assessment/Plan   Education and counseling provided:  Are appropriate based on today's review and evaluation  Pneumococcal Vaccine  Influenza Vaccine  Screening Mammography  Screening Pap and pelvic (covered once every 2 years)  Colorectal cancer screening tests  Bone mass measurement (DEXA)    1. Medicare annual wellness visit, subsequent  2. Needs flu shot  -     INFLUENZA, FLUAD, (AGE 65 Y+), IM, PF, 0.5 ML  3. Advanced directives, counseling/discussion  -     ADVANCE CARE PLANNING FIRST 30 MINS  -     FULL CODE  4. Personal history of tobacco use, presenting hazards to health  -     CT LOW DOSE LUNG CANCER SCREENING; Future  5. Encounter for screening mammogram for malignant neoplasm of breast  -     BEV MAMMO BI SCREENING INCL CAD; Future  6. Postmenopausal state  -     DEXA BONE DENSITY STUDY AXIAL; Future  7. Encounter for immunization  -     ADMIN PNEUMOCOCCAL VACCINE  8. Mixed hyperlipidemia  -     CBC W/O DIFF; Future  -     METABOLIC PANEL, COMPREHENSIVE; Future  -     FERRITIN; Future  -     LIPID PANEL; Future  -     HEMOGLOBIN A1C WITH EAG; Future  -     URINALYSIS W/ RFLX MICROSCOPIC; Future  -     TSH 3RD GENERATION; Future  9. Controlled diabetes mellitus type 2 with complications, unspecified whether long term insulin use (HCC)  -     CBC W/O DIFF; Future  -     METABOLIC PANEL, COMPREHENSIVE; Future  -     FERRITIN; Future  -     LIPID PANEL; Future  -     HEMOGLOBIN A1C WITH EAG; Future  -     URINALYSIS W/ RFLX MICROSCOPIC; Future  -     TSH 3RD GENERATION; Future  10. Low energy  -     CBC W/O DIFF; Future  -     METABOLIC PANEL, COMPREHENSIVE; Future  -     FERRITIN; Future  -     LIPID PANEL; Future  -     HEMOGLOBIN A1C WITH EAG;  Future  -     URINALYSIS W/ RFLX MICROSCOPIC; Future  -     TSH 3RD GENERATION; Future  11. Cerebrovascular accident (CVA), unspecified mechanism ([facility]  -     CBC W/O DIFF; Future  -     METABOLIC PANEL, COMPREHENSIVE; Future  -     FERRITIN; Future  -     LIPID PANEL; Future  -     HEMOGLOBIN A1C WITH EAG; Future  -     URINALYSIS W/ RFLX MICROSCOPIC; Future  -     TSH 3RD GENERATION; Future  12. Ulcerative pancolitis with other complication ([facility]       White blood cell of 13.6, glucose reading of 162 BUN/creatinine ratio of 29, normalized potassium level normal lipid panel, worsened diabetic A1c from 7.2-8 percentile otherwise normal and stable results        Depression Risk Factor Screening     3 most recent PHQ Screens 10/20/2022   Little interest or pleasure in doing things Not at all   Feeling down, depressed, irritable, or hopeless Not at all   Total Score PHQ 2 0   Trouble falling or staying asleep, or sleeping too much Not at all   Feeling tired or having little energy Not at all   Poor appetite, weight loss, or overeating Not at all   Feeling bad about yourself - or that you are a failure or have let yourself or your family down Not at all   Trouble concentrating on things such as school, work, reading, or watching TV Not at all   Moving or speaking so slowly that other people could have noticed; or the opposite being so fidgety that others notice Not at all   Thoughts of being better off dead, or hurting yourself in some way Not at all   How difficult have these problems made it for you to do your work, take care of your home and get along with others Not difficult at all       Alcohol & Drug Abuse Risk Screen    Do you average more than 1 drink per night or more than 7 drinks a week:  No    On any one occasion in the past three months have you have had more than 3 drinks containing alcohol:  No          Functional Ability and Level of Safety    Hearing: Hearing is good. Activities of Daily Living:   The home contains: grab bars and rugs  Patient needs help with:  transportation, shopping, and walking      Ambulation: with difficulty, uses a cane     Fall Risk:  Fall Risk Assessment, last 12 mths 10/20/2022   Able to walk? Yes   Fall in past 12 months? 0   Do you feel unsteady? 0   Are you worried about falling 0   Is TUG test greater than 12 seconds?  -   Is the gait abnormal? -      Abuse Screen:  Patient is not abused       Cognitive Screening    Has your family/caregiver stated any concerns about your memory: no     Cognitive Screening: Normal - MMSE (Mini Mental Status Exam)    Health Maintenance Due     Health Maintenance Due   Topic Date Due    COVID-19 Vaccine (1) Never done    Pneumococcal 65+ years (1 - PCV) Never done    Eye Exam Retinal or Dilated  Never done    Foot Exam Q1  08/16/2017    Shingrix Vaccine Age 50> (2 of 2) 02/08/2020    MICROALBUMIN Q1  07/16/2020    Medicare Yearly Exam  08/12/2022    Lipid Screen  11/03/2022       Patient Care Team   Patient Care Team:  Doris Galarza MD as PCP - General (Family Medicine)  Doris Galarza MD as PCP - [facility] Empaneled Provider    History     Patient Active Problem List   Diagnosis Code    HTN, goal below 130/80 I10    Hemiparesis affecting left side as late effect of cerebrovascular accident [facility]) I69.354    Hypercholesteremia E78.00    S/P stroke due to cerebrovascular disease Z86.73    Prediabetes R73.03    Lipoma of back D17.1    Non compliance w medication regimen Z91.14    Diabetes mellitus type 2, controlled ([address] E11.9    Knee pain, bilateral M25.561, M25.562    Neuropathic pain of both feet G57.93    Type 2 diabetes mellitus with diabetic neuropathy ([address] E11.40    Stroke ([address] I63.9    Type II diabetes mellitus with nephropathy ([address] E11.21    Seropositive rheumatoid arthritis ([address] M05.9     Past Medical History:   Diagnosis Date    Arthritis     hands/all over    Diabetes mellitus ([address] 6/15/2015    Dyspepsia and other specified disorders of function of stomach     Foot pain, bilateral 6/15/2015    Hemiparesis affecting left side as late effect of cerebrovascular accident ([facility] 4/18/2014    Hypercholesteremia 4/18/2014    Hypertension     Knee pain, bilateral 6/15/2015    Lipoma of back 5/14/2015    Nausea & vomiting     Prediabetes 5/14/2015    S/P stroke due to cerebrovascular disease 4/18/2014    Screening for breast cancer 9/15/2015    Type II diabetes mellitus with nephropathy ([facility] 7/23/2019      Past Surgical History:   Procedure Laterality Date    COLONOSCOPY N/A 10/17/2019    COLONOSCOPY performed by Renetta Navarro MD at [facility] ENDOSCOPY    HX OTHER SURGICAL      lipoma removed from back    [address]  2004    hernia repair     Current Outpatient Medications   Medication Sig Dispense Refill    predniSONE (DELTASONE) 10 mg tablet PLEASE SEE ATTACHED FOR DETAILED DIRECTIONS      inFLIXimab (REMICADE) 100 mg injection by IntraVENous route every thirty (30) days. metoprolol tartrate (LOPRESSOR) 25 mg tablet TAKE 1 TABLET BY MOUTH TWICE A DAY 60 Tablet 0    cloNIDine HCL (CATAPRES) 0.2 mg tablet TAKE 1 TABLET BY MOUTH EVERY DAY AT NIGHT 90 Tablet 1    simvastatin (ZOCOR) 10 mg tablet TAKE 1 TABLET BY MOUTH EVERY NIGHT 90 Tablet 1    cyanocobalamin (Vitamin B-12) 1,000 mcg tablet Take 1 Tablet by mouth daily. 90 Tablet 3    lisinopril-hydroCHLOROthiazide (PRINZIDE, ZESTORETIC) 20-25 mg per tablet TAKE 1 TABLET BY MOUTH DAILY. PATIENT REQUIRES AN OFFICE VISIT FOR ADDITIONAL REFILLS. 90 Tablet 3    ergocalciferol (ERGOCALCIFEROL) 1,250 mcg (50,000 unit) capsule Take 1 Capsule by mouth every seven (7) days. Indications: vitamin D deficiency (high dose therapy) 12 Capsule 4    etodolac (LODINE) 400 mg tablet Take 1 Tablet by mouth two (2) times daily as needed for Pain. (Patient not taking: Reported on 10/20/2022) 40 Tablet 1    folic acid (FOLVITE) 1 mg tablet Take 1 Tab by mouth daily.  (Patient not taking: Reported on 10/20/2022) 90 Tab 5    metFORMIN (GLUCOPHAGE) 500 mg tablet Take 1 Tab by mouth daily (with breakfast). (Patient not taking: Reported on 10/20/2022) 90 Tab 3    calcium carbonate-vitamin D3 (CALTRATE 600 PLUS D) 600 mg (1,500 mg)-800 unit chew Take 1 Tab by mouth daily. (Patient not taking: Reported on 10/20/2022) 90 Tab 3    aspirin 81 mg chewable tablet Take 1 Tab by mouth daily. (Patient not taking: Reported on 10/20/2022) 30 Tab 11     Allergies   Allergen Reactions    Codeine Nausea and Vomiting       Family History   Problem Relation Age of Onset    Hypertension Mother     Diabetes Mother     Arthritis-rheumatoid Father     Hypertension Brother     Stroke Brother      Social History     Tobacco Use    Smoking status: Former     Packs/day: 0.25     Years: 35.00     Pack years: 8.75     Types: Cigarettes     Quit date: 2009     Years since quittin.4    Smokeless tobacco: Never   Substance Use Topics    Alcohol use: No     Comment: ETOH abuse in past; quit          Steve Domingo MD   Discussed with the patient the current USPSTF guidelines released 2021 for screening for lung cancer. For adults aged 48 to [de-identified] years who have a 20 pack-year smoking history and currently smoke or have quit within the past 15 years the grade B recommendation is to:  Screen for lung cancer with low-dose computed tomography (LDCT) every year. Stop screening once a person has not smoked for 15 years or has a health problem that limits life expectancy or the ability to have lung surgery. Discussed with patient the risks and benefits of screening, including over-diagnosis, false positive rate, and total radiation exposure. The patient currently exhibits no signs or symptoms suggestive of lung cancer. Discussed with patient the importance of compliance with yearly annual lung cancer screenings and willingness to undergo diagnosis and treatment if screening scan is positive.   In addition, the patient was counseled regarding the importance of remaining smoke free and/or total smoking cessation.     Also reviewed the following if the patient has Medicare that as of February 10, 2022, Medicare only covers LDCT screening in patients aged 51-72 with at least a 20 pack-year smoking history who currently smoke or have quit in the last 15 years

## 2022-10-20 NOTE — PATIENT INSTRUCTIONS
Vaccine Information Statement    Influenza (Flu) Vaccine (Inactivated or Recombinant): What You Need to Know    Many vaccine information statements are available in Hebrew and other languages. See www.immunize.org/vis. Hojas de información sobre vacunas están disponibles en español y en muchos otros idiomas. Visite www.immunize.org/vis. 1. Why get vaccinated? Influenza vaccine can prevent influenza (flu). Flu is a contagious disease that spreads around the [facility] every year, usually between October and May. Anyone can get the flu, but it is more dangerous for some people. Infants and young children, people 72 years and older, pregnant people, and people with certain health conditions or a weakened immune system are at greatest risk of flu complications. Pneumonia, bronchitis, sinus infections, and ear infections are examples of flu-related complications. If you have a medical condition, such as heart disease, cancer, or diabetes, flu can make it worse. Flu can cause fever and chills, sore throat, muscle aches, fatigue, cough, headache, and runny or stuffy nose. Some people may have vomiting and diarrhea, though this is more common in children than adults. In an average year, thousands of people in the [facility] die from flu, and many more are hospitalized. Flu vaccine prevents millions of illnesses and flu-related visits to the doctor each year. 2. Influenza vaccines     CDC recommends everyone 6 months and older get vaccinated every flu season. Children 6 months through 6years of age may need 2 doses during a single flu season. Everyone else needs only 1 dose each flu season. It takes about 2 weeks for protection to develop after vaccination. There are many flu viruses, and they are always changing. Each year a new flu vaccine is made to protect against the influenza viruses believed to be likely to cause disease in the upcoming flu season.  Even when the vaccine doesnt exactly match these viruses, it may still provide some protection. Influenza vaccine does not cause flu. Influenza vaccine may be given at the same time as other vaccines. 3. Talk with your health care provider    Tell your vaccination provider if the person getting the vaccine:  Has had an allergic reaction after a previous dose of influenza vaccine, or has any severe, life-threatening allergies   Has ever had Guillain-Barré Syndrome (also called GBS)    In some cases, your health care provider may decide to postpone influenza vaccination until a future visit. Influenza vaccine can be administered at any time during pregnancy. People who are or will be pregnant during influenza season should receive inactivated influenza vaccine. People with minor illnesses, such as a cold, may be vaccinated. People who are moderately or severely ill should usually wait until they recover before getting influenza vaccine. Your health care provider can give you more information. 4. Risks of a vaccine reaction    Soreness, redness, and swelling where the shot is given, fever, muscle aches, and headache can happen after influenza vaccination. There may be a very small increased risk of Guillain-Barré Syndrome (GBS) after inactivated influenza vaccine (the flu shot). [facility] who get the flu shot along with pneumococcal vaccine (PCV13) and/or DTaP vaccine at the same time might be slightly more likely to have a seizure caused by fever. Tell your health care provider if a child who is getting flu vaccine has ever had a seizure. People sometimes faint after medical procedures, including vaccination. Tell your provider if you feel dizzy or have vision changes or ringing in the ears. As with any medicine, there is a very remote chance of a vaccine causing a severe allergic reaction, other serious injury, or death. 5. What if there is a serious problem?     An allergic reaction could occur after the vaccinated person leaves the clinic. If you see signs of a severe allergic reaction (hives, swelling of the face and throat, difficulty breathing, a fast heartbeat, dizziness, or weakness), call 9-1-1 and get the person to the nearest hospital.    For other signs that concern you, call your health care provider. Adverse reactions should be reported to the Vaccine Adverse Event Reporting System (VAERS). Your health care provider will usually file this report, or you can do it yourself. Visit the VAERS website at www.vaers. [facility].gov or call 7-825.601.3644. VAERS is only for reporting reactions, and VAERS staff members do not give medical advice. 6. The National Vaccine Injury Compensation Program    The [facility] Vaccine Injury Compensation Program (VICP) is a federal program that was created to compensate people who may have been injured by certain vaccines. Claims regarding alleged injury or death due to vaccination have a time limit for filing, which may be as short as two years. Visit the VICP website at www.[facility].gov/vaccinecompensation or call 2-551.106.6526 to learn about the program and about filing a claim. 7. How can I learn more? Ask your health care provider. Call your local or state health department. Visit the website of the Food and Drug Administration (FDA) for vaccine package inserts and additional information at www.fda.gov/vaccines-blood-biologics/vaccines. Contact the Centers for Disease Control and Prevention (CDC): Call 3-656.576.3830 (6-927-UKX-INFO) or  Visit CDCs influenza website at www.cdc.gov/flu. Vaccine Information Statement   Inactivated Influenza Vaccine   8/6/2021  [address] [place] [address] [id number]   Department of Health and Human Services  Centers for Disease Control and Prevention    Office Use Only      Medicare Wellness Visit, Female     The best way to live healthy is to have a lifestyle where you eat a well-balanced diet, exercise regularly, limit alcohol use, and quit all forms of tobacco/nicotine, if applicable. Regular preventive services are another way to keep healthy. Preventive services (vaccines, screening tests, monitoring & exams) can help personalize your care plan, which helps you manage your own care. Screening tests can find health problems at the earliest stages, when they are easiest to treat. [facility] follows the current, evidence-based guidelines published by the [facility] [employer] ([facility] [[facility]]STF) when recommending preventive services for our patients. Because we follow these guidelines, sometimes recommendations change over time as research supports it. (For example, mammograms used to be recommended annually. Even though Medicare will still pay for an annual mammogram, the newer guidelines recommend a mammogram every two years for women of average risk). Of course, you and your doctor may decide to screen more often for some diseases, based on your risk and your co-morbidities (chronic disease you are already diagnosed with). Preventive services for you include:  - Medicare offers their members a free annual wellness visit, which is time for you and your primary care provider to discuss and plan for your preventive service needs. Take advantage of this benefit every year!  -All adults over the age of 72 should receive the recommended pneumonia vaccines. Current USPSTF guidelines recommend a series of two vaccines for the best pneumonia protection.   -All adults should have a flu vaccine yearly and a tetanus vaccine every 10 years.   -All adults age 48 and older should receive the shingles vaccines (series of two vaccines).       -All adults age 38-68 who are overweight should have a diabetes screening test once every three years.   -All adults born between [birth year] and 1965 should be screened once for Hepatitis C.  -Other screening tests and preventive services for persons with diabetes include: an eye exam to screen for diabetic retinopathy, a kidney function test, a foot exam, and stricter control over your cholesterol.   -Cardiovascular screening for adults with routine risk involves an electrocardiogram (ECG) at intervals determined by your doctor.   -Colorectal cancer screenings should be done for adults age 54-65 with no increased risk factors for colorectal cancer. There are a number of acceptable methods of screening for this type of cancer. Each test has its own benefits and drawbacks. Discuss with your doctor what is most appropriate for you during your annual wellness visit. The different tests include: colonoscopy (considered the best screening method), a fecal occult blood test, a fecal DNA test, and sigmoidoscopy.    -A bone mass density test is recommended when a woman turns 65 to screen for osteoporosis. This test is only recommended one time, as a screening. Some providers will use this same test as a disease monitoring tool if you already have osteoporosis. -Breast cancer screenings are recommended every other year for women of normal risk, age 54-69.  -Cervical cancer screenings for women over age 72 are only recommended with certain risk factors.      Here is a list of your current Health Maintenance items (your personalized list of preventive services) with a due date:  Health Maintenance Due   Topic Date Due    COVID-19 Vaccine (1) Never done    Pneumococcal Vaccine (1 - PCV) Never done    Eye Exam  Never done    Diabetic Foot Care  08/16/2017    Shingles Vaccine (2 of 2) 02/08/2020    Albumin Urine Test  07/16/2020    Annual Well Visit  08/12/2022    Cholesterol Test   11/03/2022

## 2022-10-21 LAB
ALBUMIN SERPL-MCNC: 4.4 G/DL (ref 3.8–4.8)
ALBUMIN/GLOB SERPL: 1.6 {RATIO} (ref 1.2–2.2)
ALP SERPL-CCNC: 78 IU/L (ref 44–121)
ALT SERPL-CCNC: 15 IU/L (ref 0–32)
AST SERPL-CCNC: 18 IU/L (ref 0–40)
BILIRUB SERPL-MCNC: 0.7 MG/DL (ref 0–1.2)
BUN SERPL-MCNC: 25 MG/DL (ref 8–27)
BUN/CREAT SERPL: 29 (ref 12–28)
CALCIUM SERPL-MCNC: 9.3 MG/DL (ref 8.7–10.3)
CHLORIDE SERPL-SCNC: 101 MMOL/L (ref 96–106)
CHOLEST SERPL-MCNC: 195 MG/DL (ref 100–199)
CO2 SERPL-SCNC: 23 MMOL/L (ref 20–29)
CREAT SERPL-MCNC: 0.86 MG/DL (ref 0.57–1)
EGFR: 75 ML/MIN/1.73
ERYTHROCYTE [DISTWIDTH] IN BLOOD BY AUTOMATED COUNT: 13.3 % (ref 11.7–15.4)
EST. AVERAGE GLUCOSE BLD GHB EST-MCNC: 183 MG/DL
FERRITIN SERPL-MCNC: 86 NG/ML (ref 15–150)
GLOBULIN SER CALC-MCNC: 2.7 G/DL (ref 1.5–4.5)
GLUCOSE SERPL-MCNC: 162 MG/DL (ref 70–99)
HBA1C MFR BLD: 8 % (ref 4.8–5.6)
HCT VFR BLD AUTO: 37.1 % (ref 34–46.6)
HDLC SERPL-MCNC: 67 MG/DL
HGB BLD-MCNC: 12.3 G/DL (ref 11.1–15.9)
LDLC SERPL CALC-MCNC: 116 MG/DL (ref 0–99)
MCH RBC QN AUTO: 30 PG (ref 26.6–33)
MCHC RBC AUTO-ENTMCNC: 33.2 G/DL (ref 31.5–35.7)
MCV RBC AUTO: 91 FL (ref 79–97)
PLATELET # BLD AUTO: 359 X10E3/UL (ref 150–450)
POTASSIUM SERPL-SCNC: 4 MMOL/L (ref 3.5–5.2)
PROT SERPL-MCNC: 7.1 G/DL (ref 6–8.5)
RBC # BLD AUTO: 4.1 X10E6/UL (ref 3.77–5.28)
SODIUM SERPL-SCNC: 140 MMOL/L (ref 134–144)
TRIGL SERPL-MCNC: 66 MG/DL (ref 0–149)
TSH SERPL DL<=0.005 MIU/L-ACNC: 0.96 UIU/ML (ref 0.45–4.5)
VLDLC SERPL CALC-MCNC: 12 MG/DL (ref 5–40)
WBC # BLD AUTO: 13.6 X10E3/UL (ref 3.4–10.8)

## 2022-10-28 LAB
APPEARANCE UR: ABNORMAL
BILIRUB UR QL STRIP: NEGATIVE
COLOR UR: YELLOW
GLUCOSE UR QL STRIP: NEGATIVE
HGB UR QL STRIP: NEGATIVE
KETONES UR QL STRIP: NEGATIVE
LEUKOCYTE ESTERASE UR QL STRIP: NEGATIVE
MICRO URNS: ABNORMAL
NITRITE UR QL STRIP: NEGATIVE
PH UR STRIP: 5.5 [PH] (ref 5–7.5)
PROT UR QL STRIP: NEGATIVE
SP GR UR STRIP: 1.02 (ref 1–1.03)
UROBILINOGEN UR STRIP-MCNC: 0.2 MG/DL (ref 0.2–1)

## 2022-11-17 ENCOUNTER — HOSPITAL ENCOUNTER (OUTPATIENT)
Dept: MAMMOGRAPHY | Age: 67
Discharge: HOME | End: 2022-11-17
Payer: MEDICARE

## 2022-11-17 DIAGNOSIS — Z12.31 ENCOUNTER FOR SCREENING MAMMOGRAM FOR MALIGNANT NEOPLASM OF BREAST: ICD-10-CM

## 2022-11-17 PROCEDURE — 77067 SCR MAMMO BI INCL CAD: CPT

## 2022-12-09 ENCOUNTER — HOSPITAL ENCOUNTER (OUTPATIENT)
Dept: INFUSION THERAPY | Age: 67
End: 2022-12-09

## 2022-12-14 ENCOUNTER — OFFICE VISIT (OUTPATIENT)
Dept: RHEUMATOLOGY | Age: 67
End: 2022-12-14
Payer: MEDICARE

## 2022-12-14 VITALS
WEIGHT: 111 LBS | SYSTOLIC BLOOD PRESSURE: 146 MMHG | DIASTOLIC BLOOD PRESSURE: 87 MMHG | HEART RATE: 73 BPM | TEMPERATURE: 98.3 F | BODY MASS INDEX: 21.68 KG/M2 | OXYGEN SATURATION: 95 % | RESPIRATION RATE: 16 BRPM

## 2022-12-14 DIAGNOSIS — Z79.899 ONGOING USE OF POSSIBLY TOXIC MEDICATION: ICD-10-CM

## 2022-12-14 DIAGNOSIS — M81.8 OTHER OSTEOPOROSIS WITHOUT CURRENT PATHOLOGICAL FRACTURE: ICD-10-CM

## 2022-12-14 DIAGNOSIS — M05.9 SEROPOSITIVE RHEUMATOID ARTHRITIS (HCC): Primary | ICD-10-CM

## 2022-12-14 PROCEDURE — 1090F PRES/ABSN URINE INCON ASSESS: CPT | Performed by: INTERNAL MEDICINE

## 2022-12-14 PROCEDURE — G8420 CALC BMI NORM PARAMETERS: HCPCS | Performed by: INTERNAL MEDICINE

## 2022-12-14 PROCEDURE — G8754 DIAS BP LESS 90: HCPCS | Performed by: INTERNAL MEDICINE

## 2022-12-14 PROCEDURE — 3078F DIAST BP <80 MM HG: CPT | Performed by: INTERNAL MEDICINE

## 2022-12-14 PROCEDURE — 3017F COLORECTAL CA SCREEN DOC REV: CPT | Performed by: INTERNAL MEDICINE

## 2022-12-14 PROCEDURE — G8432 DEP SCR NOT DOC, RNG: HCPCS | Performed by: INTERNAL MEDICINE

## 2022-12-14 PROCEDURE — G8536 NO DOC ELDER MAL SCRN: HCPCS | Performed by: INTERNAL MEDICINE

## 2022-12-14 PROCEDURE — G9899 SCRN MAM PERF RSLTS DOC: HCPCS | Performed by: INTERNAL MEDICINE

## 2022-12-14 PROCEDURE — 99215 OFFICE O/P EST HI 40 MIN: CPT | Performed by: INTERNAL MEDICINE

## 2022-12-14 PROCEDURE — 1101F PT FALLS ASSESS-DOCD LE1/YR: CPT | Performed by: INTERNAL MEDICINE

## 2022-12-14 PROCEDURE — 3074F SYST BP LT 130 MM HG: CPT | Performed by: INTERNAL MEDICINE

## 2022-12-14 PROCEDURE — G8427 DOCREV CUR MEDS BY ELIG CLIN: HCPCS | Performed by: INTERNAL MEDICINE

## 2022-12-14 PROCEDURE — G8753 SYS BP > OR = 140: HCPCS | Performed by: INTERNAL MEDICINE

## 2022-12-14 PROCEDURE — 1123F ACP DISCUSS/DSCN MKR DOCD: CPT | Performed by: INTERNAL MEDICINE

## 2022-12-14 RX ORDER — METHOTREXATE 2.5 MG/1
10 TABLET ORAL
Qty: 48 TABLET | Refills: 0 | Status: SHIPPED | OUTPATIENT
Start: 2022-12-17

## 2022-12-14 RX ORDER — FOLIC ACID 1 MG/1
1 TABLET ORAL DAILY
Qty: 90 TABLET | Refills: 5 | Status: SHIPPED | OUTPATIENT
Start: 2022-12-14

## 2022-12-14 NOTE — PROGRESS NOTES
Chief Complaint   Patient presents with    Joint Pain     1. Have you been to the ER, urgent care clinic since your last visit? Hospitalized since your last visit? No    2. Have you seen or consulted any other health care providers outside of the [address] [place] [address] since your last visit? Include any pap smears or colon screening.  No

## 2022-12-14 NOTE — PATIENT INSTRUCTIONS
1) Take 4 pills of Methotrexate once per week with daily folic acid. 2) Continue to get monthly Remicade infusions as scheduled. 3) I am ordering a bone density scan for you to get at your earliest convenience. Try and schedule this at the same place that you had your scan in 2019 done. 4) You can take 650mg of Tylenol up to 3 times a day for joint pain. 5) Check labs one month after starting Methotrexate. 6) Follow up in 2 months. Let me know if you have any questions or concerns in the meantime.

## 2022-12-14 NOTE — PROGRESS NOTES
Ning Rodriguez is a 74 y.o. female who was seen in-office on 12/14/2022 for followup. HISTORY OF DISEASE: Seropositive erosive rheumatoid arthritis    Year of diagnosis: 2019  First visit with this clinic: 10/2019  Cumulative disease manifestations: erosions  Positive serologies/labs: RF, CCP  Negative serologies/labs: Other co-morbidities: CVA with residual right sided hemiparesis, DM with neuropathy, HTN  Relapses:      Past treatment history:  Prednisone: prednisone 20->5mg tapered, 4/22- now  Non-biologic DMARD: Methotrexate 20 mg oral weekly (11/2019-->increased to 20 mg oral weekly in 2/2020 pt dc'd 7/21)  Biologic: Rinvoq 12/2019-6/2021, infliximab 5mg/kg q8wk (7/19/22- )  Other:    HISTORY OF PRESENT ILLNESS     Pt returns for a follow up. LV 8/22/2022. She says she is doing OK today. Pt still gets Infliximab infusions with good tolerance. She missed her last one because of transportation issues. She has not taken Prednisone for over a month now. She denies infections since LV. She recalls that she did not have any negative side effects when she was taking Methotrexate. Pt has been working with a physical therapist after her stroke. She says that the pain in her hands has been OK. She is pleased with her arthritis control currently. Pt has pain in her R ankle, which is worse in the morning. She says that this has been worse since the weather started to cool down. Pt denies new numbness or tingling in the hands or feet. She denies R sided numbness since her stroke. Pt does not drink more than 3 drinks per week.      REVIEW OF SYSTEMS    Positives as per history  Negatives as follows:  Kalpana Gustafson:    Denies unexplained persistent fevers, weight change, chronic fatigue  HEAD/EYES:              Denies eye redness, blurry vision or sudden loss of vision, dry eyes, HA, temporal artery pain  ENT:                            Denies oral/nasal ulcers, recurrent sinus infections, dry mouth  RESPIRATORY:         No pleuritic pain, history of pleural effusions, hemoptysis, exertional dyspnea  CARDIOVASCULAR:             Denies chest pain, history of pericardial effusions  GASTRO:                    Denies heartburn, esophageal dysmotility, abdominal pain, nausea, vomiting, diarrhea, blood in the stool  HEMATOLOGIC:        No easy bruising, purpura, swollen lymph nodes  SKIN:                           Denies alopecia, ulcers, nodules, sun sensitivity, unexplained persistent rash   VASCULAR:                Denies edema, cyanosis, raynaud phenomenon  NEUROLOGIC:           Denies specific muscle weakness, paresthesias   PSYCHIATRIC:           No sleep disturbance / snoring, depression, anxiety  MSK:                           No morning stiffness >1 hour, SI joint pain, persistent joint swelling, persistent joint pain    PAST MEDICAL HISTORY    Past Medical History:   Diagnosis Date    Arthritis     hands/all over    Diabetes mellitus ([address] 6/15/2015    Dyspepsia and other specified disorders of function of stomach     Foot pain, bilateral 6/15/2015    Hemiparesis affecting left side as late effect of cerebrovascular accident ([address] 4/18/2014    Hypercholesteremia 4/18/2014    Hypertension     Knee pain, bilateral 6/15/2015    Lipoma of back 5/14/2015    Nausea & vomiting     Prediabetes 5/14/2015    S/P stroke due to cerebrovascular disease 4/18/2014    Screening for breast cancer 9/15/2015    Type II diabetes mellitus with nephropathy ([address] 7/23/2019        Past Surgical History:   Procedure Laterality Date    COLONOSCOPY N/A 10/17/2019    COLONOSCOPY performed by Josetta Dubin, MD at [facility] ENDOSCOPY    HX OTHER SURGICAL      lipoma removed from back    HX [id number] UNLISTED  2004    hernia repair       FAMILY HISTORY    Family History   Problem Relation Age of Onset    Hypertension Mother     Diabetes Mother     Arthritis-rheumatoid Father     Hypertension Brother     Stroke Brother        SOCIAL HISTORY    Social History     Tobacco Use    Smoking status: Former     Packs/day: 0.25     Years: 35.00     Pack years: 8.75     Types: Cigarettes     Quit date: 2009     Years since quittin.5    Smokeless tobacco: Never   Vaping Use    Vaping Use: Never used   Substance Use Topics    Alcohol use: No     Comment: ETOH abuse in past; quit     Drug use: No       MEDICATIONS    Current Outpatient Medications   Medication Sig Dispense Refill    predniSONE (DELTASONE) 10 mg tablet PLEASE SEE ATTACHED FOR DETAILED DIRECTIONS      inFLIXimab (REMICADE) 100 mg injection by IntraVENous route every thirty (30) days. metoprolol tartrate (LOPRESSOR) 25 mg tablet TAKE 1 TABLET BY MOUTH TWICE A DAY 60 Tablet 0    cloNIDine HCL (CATAPRES) 0.2 mg tablet TAKE 1 TABLET BY MOUTH EVERY DAY AT NIGHT 90 Tablet 1    simvastatin (ZOCOR) 10 mg tablet TAKE 1 TABLET BY MOUTH EVERY NIGHT 90 Tablet 1    cyanocobalamin (Vitamin B-12) 1,000 mcg tablet Take 1 Tablet by mouth daily. 90 Tablet 3    lisinopril-hydroCHLOROthiazide (PRINZIDE, ZESTORETIC) 20-25 mg per tablet TAKE 1 TABLET BY MOUTH DAILY. PATIENT REQUIRES AN OFFICE VISIT FOR ADDITIONAL REFILLS. 90 Tablet 3    etodolac (LODINE) 400 mg tablet Take 1 Tablet by mouth two (2) times daily as needed for Pain. (Patient not taking: Reported on 10/20/2022) 40 Tablet 1    ergocalciferol (ERGOCALCIFEROL) 1,250 mcg (50,000 unit) capsule Take 1 Capsule by mouth every seven (7) days. Indications: vitamin D deficiency (high dose therapy) 12 Capsule 4    folic acid (FOLVITE) 1 mg tablet Take 1 Tab by mouth daily. (Patient not taking: Reported on 10/20/2022) 90 Tab 5    metFORMIN (GLUCOPHAGE) 500 mg tablet Take 1 Tab by mouth daily (with breakfast). (Patient not taking: Reported on 10/20/2022) 90 Tab 3    calcium carbonate-vitamin D3 (CALTRATE 600 PLUS D) 600 mg (1,500 mg)-800 unit chew Take 1 Tab by mouth daily.  (Patient not taking: Reported on 10/20/2022) 90 Tab 3    aspirin 81 mg chewable tablet Take 1 Tab by mouth daily. (Patient not taking: Reported on 10/20/2022) 30 Tab 11       ALLERGIES    Allergies   Allergen Reactions    Codeine Nausea and Vomiting       PHYSICAL EXAMINATION  Visit Vitals  BP (!) 146/87 (BP 1 Location: Left upper arm, BP Patient Position: Sitting, BP Cuff Size: Adult)   Pulse 73   Temp 98.3 °F (36.8 °C) (Oral)   Resp 16   Wt 111 lb (50.3 kg)   LMP  (LMP Unknown)   SpO2 95%   BMI 21.68 kg/m²     General:  The patient is well developed, well nourished, alert, and in no apparent distress. Eyes: Sclera are anicteric. No conjunctival injection. HEENT:  Oropharynx is clear. No oral ulcers. Adequate salivary pooling. No cervical or supraclavicular lymphadenopathy. Lungs:  Clear to auscultation bilaterally, without wheeze or stridor. Normal respiratory effort. Cor:  Regular rate and rhythm. No murmurs rubs or gallops. Abdomen: Soft, non-tender, without hepatomegaly or masses. Extremities: No calf tenderness or edema. Warm and well perfused. Skin:  No significant abnormalities. No petechial, purpuric, or psoriaform rashes. Neuro: No foot or wrist drop, stable post-CVA weakness right hand. Musculoskeletal:    A comprehensive musculoskeletal exam was performed for all joints of each upper and lower extremity and assessed for swelling, tenderness and range of motion. Results are documented as below: Stable 15 degree flexion deformity of R elbow. Interval resolved wrist tenderness but persistent bilateral left > right wrist synovitis. Crepitus in left elbow with 5 degree flexion deformity. Interval resolved tenderness and synovitis of L wrist.   Interval resolved left-sided MCP synovitis  Stable right MCP3-5 fixed flexion deformities to 90deg without synovitis or tenderness. Stable bilateral knee crepitus without warmth or effusion. Trace right ankle synovitis without tenderness.   Bilateral left > right hammertoes. DATA REVIEW    Labs:   10/27/22: UA cloudy appearance  10/20/22: TSH 0.957, HGB A1c 8, , Ferritin 86, Cr 0.86, LFT WNL, WBC 13.6, HGB 12.3, Plt 359,   10/10/22: ESR 37, CRP <0.29 mg/dL, WBC 7.9, HGB 11.1, Plt 279, Cr 0.86, Tbili 0.5, Alk phos 69, AST 46, ALT 27  8/2/22: CRP <0.29, , Cr 0.91, LFT WNL, WBC 8.4, HGB 10.9, Plt 292  5/5/22: Hgb A1c (POC) 6.4, Vit B12 215, QuantiFERON plus negative, ESR 35, Cr 0.68, LFT WNL, WBC 12.7, HGB 11.1, Plt 468, CRP 1 mg/L  11/3/21: CBC WNL, ESR 56; Cr 0.89, Tbili 0.4, AST 14, ALT 10, AlkP 122; CRP 3mg/L  3/2/21: CBC WNL, ESR 18, Cr 0.83, LFTs WNL, CRP <1mg/L  2/2020: Cbc, cmp unremarkable, lipids normal  12/2019: Cbc, cmp unremarkable  10/2019: Quant gold, HBV, HCV negative, RF, CCP positive, ESR 61, CRP, CMP normal  7/2019: cbc, CMP, TSH normal  5/14/15 Vit D 9.3    Imaging:   Hand xrays (11/3/21): On my review, extensive erosions and collapse of wrists and carpals. Evidence of mild osteoarthritic/osteopenic change involving the bones of the hand. Evidence of marked degenerative change/fusion of the carpal bones. Marked narrowing of the radiocarpal joint. Irregularity of the distal ulna. Hand x-rays (10/2019): Personally reviewed images. + severe erosive changes  Foot x-rays (10/2019): Personally reviewed images. + erosive and DJD changes    ASSESSMENT AND PLAN    A 74 y.o. female with hx of CVA with right sided weakness and flexion deformity of the hand; DM, seropositive erosive rheumatoid arthritis who has still low disease activity but smoldering synovitis of wrists. Adding methotrexate for prevention of anti-drug antibodies on infliximab, and for additional arthritis control. 1. Seropositive rheumatoid arthritis (HCC)  - methotrexate (RHEUMATREX) 2.5 mg tablet; Take 4 Tablets by mouth Every Saturday. Dispense: 48 Tablet; Refill: 0  - folic acid (FOLVITE) 1 mg tablet; Take 1 Tablet by mouth daily. Dispense: 90 Tablet;  Refill: 5  - C REACTIVE PROTEIN, QT; Standing  - CBC WITH AUTOMATED DIFF; Standing  - METABOLIC PANEL, COMPREHENSIVE; Standing  - SED RATE (ESR); Standing    2. Ongoing use of possibly toxic medication  - methotrexate (RHEUMATREX) 2.5 mg tablet; Take 4 Tablets by mouth Every Saturday. Dispense: 48 Tablet; Refill: 0  - folic acid (FOLVITE) 1 mg tablet; Take 1 Tablet by mouth daily. Dispense: 90 Tablet; Refill: 5  - C REACTIVE PROTEIN, QT; Standing  - CBC WITH AUTOMATED DIFF; Standing  - METABOLIC PANEL, COMPREHENSIVE; Standing  - SED RATE (ESR); Standing    3. Other osteoporosis without current pathological fracture  - DEXA BONE DENSITY STUDY AXIAL; Future        Patient Instructions   1) Take 4 pills of Methotrexate once per week with daily folic acid. 2) Continue to get monthly Remicade infusions as scheduled. 3) I am ordering a bone density scan for you to get at your earliest convenience. Try and schedule this at the same place that you had your scan in 2019 done. 4) You can take 650mg of Tylenol up to 3 times a day for joint pain. 5) Check labs one month after starting Methotrexate. 6) Follow up in 2 months. Let me know if you have any questions or concerns in the meantime.      TODAY'S ORDERS    Orders Placed This Encounter    DEXA BONE DENSITY STUDY AXIAL    C REACTIVE PROTEIN, QT    CBC WITH AUTOMATED DIFF    METABOLIC PANEL, COMPREHENSIVE    SED RATE (ESR)    methotrexate (RHEUMATREX) 2.5 mg tablet    folic acid (FOLVITE) 1 mg tablet       Future Appointments   Date Time Provider Kaur Herzog   12/14/2022  4:20 PM Stacy Kovacs MD [facility] BS AMB   2/9/2023  9:00 AM [facility] INFUSION NURSE [facility]                                                                                                                                                              Face to face time: 30 minutes  Note preparation and records review day of service: 35 minutes  Total provider time day of service: 72 minutes    This was scribed by Jackson Rocha in the presence of Dr. Sharifa Nails. The note was reviewed and amended personally, and I agree with the above information.     Je Gomez MD    Adult Rheumatology   [facility]  A Part of [facility], [address] [place] [address]   Phone [phone]  Fax [phone]

## 2022-12-16 ENCOUNTER — TELEPHONE (OUTPATIENT)
Dept: RHEUMATOLOGY | Age: 67
End: 2022-12-16

## 2022-12-16 NOTE — TELEPHONE ENCOUNTER
Wolf Garcia from [facility] called office stating needing Clarified order for the infusion drug for the pt is needed. Please advise.     Phone # [phone]  Fax # [phone]

## 2022-12-19 ENCOUNTER — HOSPITAL ENCOUNTER (OUTPATIENT)
Dept: INFUSION THERAPY | Age: 67
Discharge: HOME | End: 2022-12-19
Payer: MEDICARE

## 2022-12-19 VITALS
DIASTOLIC BLOOD PRESSURE: 75 MMHG | WEIGHT: 110 LBS | BODY MASS INDEX: 21.48 KG/M2 | TEMPERATURE: 97.9 F | HEART RATE: 71 BPM | SYSTOLIC BLOOD PRESSURE: 108 MMHG | RESPIRATION RATE: 18 BRPM

## 2022-12-19 DIAGNOSIS — M05.9 SEROPOSITIVE RHEUMATOID ARTHRITIS (HCC): Primary | ICD-10-CM

## 2022-12-19 PROCEDURE — 96415 CHEMO IV INFUSION ADDL HR: CPT

## 2022-12-19 PROCEDURE — 74011250637 HC RX REV CODE- 250/637: Performed by: INTERNAL MEDICINE

## 2022-12-19 PROCEDURE — 74011250636 HC RX REV CODE- 250/636: Performed by: INTERNAL MEDICINE

## 2022-12-19 PROCEDURE — 96413 CHEMO IV INFUSION 1 HR: CPT

## 2022-12-19 RX ORDER — ONDANSETRON 2 MG/ML
8 INJECTION INTRAMUSCULAR; INTRAVENOUS AS NEEDED
OUTPATIENT
Start: 2023-01-30

## 2022-12-19 RX ORDER — DIPHENHYDRAMINE HYDROCHLORIDE 50 MG/ML
25 INJECTION, SOLUTION INTRAMUSCULAR; INTRAVENOUS AS NEEDED
Start: 2023-01-30

## 2022-12-19 RX ORDER — HYDROCORTISONE SODIUM SUCCINATE 100 MG/2ML
100 INJECTION, POWDER, FOR SOLUTION INTRAMUSCULAR; INTRAVENOUS AS NEEDED
OUTPATIENT
Start: 2023-01-30

## 2022-12-19 RX ORDER — ALBUTEROL SULFATE 0.83 MG/ML
2.5 SOLUTION RESPIRATORY (INHALATION) AS NEEDED
Start: 2023-01-30

## 2022-12-19 RX ORDER — ACETAMINOPHEN 325 MG/1
650 TABLET ORAL AS NEEDED
Start: 2023-01-30

## 2022-12-19 RX ORDER — HEPARIN 100 UNIT/ML
500 SYRINGE INTRAVENOUS AS NEEDED
Start: 2023-01-30

## 2022-12-19 RX ORDER — DIPHENHYDRAMINE HCL 25 MG
50 CAPSULE ORAL ONCE
OUTPATIENT
Start: 2023-01-30 | End: 2023-01-30

## 2022-12-19 RX ORDER — SODIUM CHLORIDE 9 MG/ML
5-250 INJECTION, SOLUTION INTRAVENOUS AS NEEDED
OUTPATIENT
Start: 2023-01-30

## 2022-12-19 RX ORDER — SODIUM CHLORIDE 9 MG/ML
5-250 INJECTION, SOLUTION INTRAVENOUS AS NEEDED
Status: DISPENSED | OUTPATIENT
Start: 2022-12-19 | End: 2022-12-19

## 2022-12-19 RX ORDER — SODIUM CHLORIDE 9 MG/ML
5-40 INJECTION INTRAMUSCULAR; INTRAVENOUS; SUBCUTANEOUS AS NEEDED
OUTPATIENT
Start: 2023-01-30

## 2022-12-19 RX ORDER — ACETAMINOPHEN 325 MG/1
650 TABLET ORAL ONCE
Status: COMPLETED | OUTPATIENT
Start: 2022-12-19 | End: 2022-12-19

## 2022-12-19 RX ORDER — DIPHENHYDRAMINE HCL 25 MG
50 CAPSULE ORAL ONCE
Status: COMPLETED | OUTPATIENT
Start: 2022-12-19 | End: 2022-12-19

## 2022-12-19 RX ORDER — SODIUM CHLORIDE 0.9 % (FLUSH) 0.9 %
5-40 SYRINGE (ML) INJECTION AS NEEDED
OUTPATIENT
Start: 2023-01-30

## 2022-12-19 RX ORDER — DIPHENHYDRAMINE HYDROCHLORIDE 50 MG/ML
50 INJECTION, SOLUTION INTRAMUSCULAR; INTRAVENOUS AS NEEDED
Start: 2023-01-30

## 2022-12-19 RX ORDER — ACETAMINOPHEN 325 MG/1
650 TABLET ORAL ONCE
Start: 2023-01-30 | End: 2023-01-30

## 2022-12-19 RX ORDER — EPINEPHRINE 1 MG/ML
0.3 INJECTION, SOLUTION, CONCENTRATE INTRAVENOUS AS NEEDED
OUTPATIENT
Start: 2023-01-30

## 2022-12-19 RX ADMIN — SODIUM CHLORIDE 25 ML/HR: 9 INJECTION, SOLUTION INTRAVENOUS at 12:30

## 2022-12-19 RX ADMIN — INFLIXIMAB 200 MG: 100 INJECTION, POWDER, LYOPHILIZED, FOR SOLUTION INTRAVENOUS at 12:35

## 2022-12-19 RX ADMIN — ACETAMINOPHEN 650 MG: 325 TABLET ORAL at 11:10

## 2022-12-19 RX ADMIN — DIPHENHYDRAMINE HYDROCHLORIDE 50 MG: 25 CAPSULE ORAL at 11:10

## 2022-12-19 NOTE — PROGRESS NOTES
[facility] Progress Note    Date: 2022    Name: Rui Yanez    MRN: [id number]         : 1955    Ms. Kiki Palacios Arrived ambulatory and in no distress for Remicade. Assessment was completed, no acute issues at this time, no new complaints voiced. 24 gauge IV started in right AC without difficulty, brisk blood return. Ms. Kellen Gomez vitals were reviewed. Patient Vitals for the past 12 hrs:   Temp Pulse Resp BP SpO2   22 1436 -- (P) 73 (P) 16 (P) 117/72 (P) 98 %   22 1001 97.9 °F (36.6 °C) 71 18 108/75 --     Medications:  Medications Administered       0.9% sodium chloride infusion       Admin Date  2022 Action  New Bag Dose  25 mL/hr Rate  25 mL/hr Route  IntraVENous Administered By  Franny Vogel RN              acetaminophen (TYLENOL) tablet 650 mg       Admin Date  2022 Action  Given Dose  650 mg Route  Oral Administered By  Franny Vogel RN              diphenhydrAMINE (BENADRYL) capsule 50 mg       Admin Date  2022 Action  Given Dose  50 mg Route  Oral Administered By  Franny Vogel RN              inFLIXimab (REMICADE) 200 mg in 0.9% sodium chloride 250 mL, overfill volume 25 mL infusion       Admin Date  2022 Action  New Bag Dose  200 mg Route  IntraVENous Administered By  Franny Vogel RN                  Ms. Kiki Palacios tolerated treatment well and was discharged from [place] [id number] in stable condition at 12. Peripheral IV flushed, removed. She is to return on 2023 for her next appointment.     Vero Rivero RN  2022

## 2022-12-21 NOTE — TELEPHONE ENCOUNTER
Spoke to [address] [place] [address] stated that the infusion was clarified and the patient received the infusion on Monday, I verbally acknowledged understanding

## 2022-12-26 DIAGNOSIS — I10 HTN, GOAL BELOW 130/80: ICD-10-CM

## 2022-12-27 RX ORDER — METOPROLOL TARTRATE 25 MG/1
TABLET, FILM COATED ORAL
Qty: 60 TABLET | Refills: 0 | Status: SHIPPED | OUTPATIENT
Start: 2022-12-27

## 2023-02-09 ENCOUNTER — APPOINTMENT (OUTPATIENT)
Dept: INFUSION THERAPY | Age: 68
End: 2023-02-09
Payer: MEDICARE

## 2023-02-13 NOTE — PROGRESS NOTES
Cardiology Inpatient Consultation  February 13, 2023    Reason for Consult:  A cardiology consult was requested to provide clinical guidance regarding chest pain with a history of PFO and PDA.    Assessment:  Radha Loya is a 45 year old female with a history of PFO, PDA, bradycardia with junctional rhythm in 2018, GERD, headaches, ASCUS with positive high risk cervical HPV, allergic rhinitis, and low back pain who presented to the ED on 02/12/2023 with three days of dizziness and one day of chest pain. Her dizziness worsens with movement and improves slightly with rest but is always present, currently being worked up by neurology. She describes her chest pain as being non-radiating sharp and central in the lower portion of her chest, and this pain also improves with rest. She endorses feeling nauseous during her dizzy spells, but denies lightheadedness. Labs were drawn and imaging was done to work up chest pain: troponin was < 6, EKG was normal with sinus rhythm, and CTA showed persistent PFO. Chest pain not likely to be due to a cardiac etiology.     1. Chest pain  2. Dizziness    Recommendations:  - CTA showed PFO, but heart otherwise appeared stable.  - Safe to discharge home on PTA meds from a cardiology perspective.    Plan of care discussed with Dr. Novoa, who agrees with above plan.      Thank you for consulting the cardiovascular services at the [facility]. Cardiology signing off at this time. Please do not hesitate to call us with any questions.     Fritz Domínguez, MS4  Cardiology Service      HPI:   Radha Loya is a 45 year old female with a history of PFO, PDA, bradycardia with junctional rhythm in 2018, GERD, headaches, ASCUS with positive high risk cervical HPV, allergic rhinitis, and low back pain who presented to the ED on 02/12/2023 with three days of dizziness and one day of chest pain. Patient is being followed by neurology for her dizziness. Her chest pain has  Sergey Maza is a 63 y.o. female who was seen in-office on 3/2/2021 for followup. HISTORY OF DISEASE: Seropositive erosive rheumatoid arthritis    Year of diagnosis: 2019  First visit with this clinic: 10/2019  Cumulative disease manifestations: erosions  Positive serologies/labs: RF, CCP  Negative serologies/labs: Other co-morbidities: CVA with residual right sided hemiparesis, DM with neuropathy, HTN  Relapses:      Past treatment history:  Prednisone: prednisone 5 mg oral daily weaned off in early 2020. Non-biologic DMARD: Methotrexate 20 mg oral weekly (11/2019-->increased to 20 mg oral weekly in 2/2020)  Biologic: Rinvoq 12/2019-present  Other:    HISTORY OF PRESENT ILLNESS     Previous medical records reviewed and summarized: yes    Says now taking methotrexate 17.5mg weekly (decreased from 20mg), Rinvoq. Still having diarrhea every 2-3 days. Overall pleased with joints. Feet burn, lasts 5-10 minutes and then passes. 5-10 minutes morning stiffness. No weakness, no problems stubbing toe or coordination problems.        REVIEW OF SYSTEMS    Positives as per history  Negatives as follows:  Adelina Kidney:    Denies unexplained persistent fevers, weight change, chronic fatigue  HEAD/EYES:              Denies eye redness, blurry vision or sudden loss of vision, dry eyes, HA, temporal artery pain  ENT:                            Denies oral/nasal ulcers, recurrent sinus infections, dry mouth  RESPIRATORY:         No pleuritic pain, history of pleural effusions, hemoptysis, exertional dyspnea  CARDIOVASCULAR:             Denies chest pain, history of pericardial effusions  GASTRO:                    Denies heartburn, esophageal dysmotility, abdominal pain, nausea, vomiting, diarrhea, blood in the stool  HEMATOLOGIC:        No easy bruising, purpura, swollen lymph nodes  SKIN:                           Denies alopecia, ulcers, nodules, sun sensitivity, unexplained persistent rash   VASCULAR:                Denies edema, cyanosis, raynaud phenomenon  NEUROLOGIC:           Denies specific muscle weakness, paresthesias   PSYCHIATRIC:           No sleep disturbance / snoring, depression, anxiety  MSK:                           No morning stiffness >1 hour, SI joint pain, persistent joint swelling, persistent joint pain    PAST MEDICAL HISTORY    Past Medical History:   Diagnosis Date    Arthritis     hands/all over    Diabetes mellitus ([facility] [address] 6/15/2015    Dyspepsia and other specified disorders of function of stomach     Foot pain, bilateral 6/15/2015    Hemiparesis affecting left side as late effect of cerebrovascular accident ([address] 2014    Hypercholesteremia 2014    Hypertension     Knee pain, bilateral 6/15/2015    Lipoma of back 2015    Nausea & vomiting     Prediabetes 2015    S/P stroke due to cerebrovascular disease 2014    Screening for breast cancer 9/15/2015    Type II diabetes mellitus with nephropathy ([facility] [address] 2019        Past Surgical History:   Procedure Laterality Date    COLONOSCOPY N/A 10/17/2019    COLONOSCOPY performed by Cornel [facility] at [facility] ENDOSCOPY    HX OTHER SURGICAL      lipoma removed from back    HX TUBAL LIGATION  1984    LA ABDOMEN SURGERY [address] UNLISTED  2004    hernia repair       FAMILY HISTORY    Family History   Problem Relation Age of Onset    Hypertension Mother     Diabetes Mother     Arthritis-rheumatoid Father     Hypertension Brother     Stroke Brother        SOCIAL HISTORY    Social History     Tobacco Use    Smoking status: Former Smoker     Packs/day: 0.25     Years: 35.00     Pack years: 8.75     Quit date: 2009     Years since quittin.7    Smokeless tobacco: Never Used   Substance Use Topics    Alcohol use: No     Comment: ETOH abuse in past; quit     Drug use: No       MEDICATIONS    Current Outpatient Medications   Medication Sig Dispense Refill    remained persistent, and labs and imaging were obtained. Troponin and EKG were normal, and CTA showed a PFO, but the heart was otherwise healthy. A cardiac etiology for her chest pain is confidently ruled out. She hopes to solve her dizziness before discharging home.    At the time of interview, the patient is experiencing chest pain that is improving slightly, dyspnea at rest or with exertion, orthopnea, PND, palpitations, lightheadedness, or syncope.     Review of Systems:    Complete review of systems was performed and negative except per HPI.    PMH:  History reviewed. No pertinent past medical history.  Active Problems:  There are no problems to display for this patient.    Social History:  Social History     Tobacco Use     Smoking status: Never     Smokeless tobacco: Never   Substance Use Topics     Alcohol use: No     Drug use: Not Currently     Family History:  History reviewed. No pertinent family history.    Medications:    aspirin  81 mg Oral Daily     famotidine  40 mg Oral At Bedtime     meclizine  25 mg Oral Q8H     sodium chloride (PF)  3 mL Intracatheter Q8H         sodium chloride 75 mL/hr at 02/13/23 1405       Physical Exam:  Temp:  [97.4  F (36.3  C)-98.3  F (36.8  C)] 98.3  F (36.8  C)  Pulse:  [56-84] 56  Resp:  [15-18] 15  BP: (118-126)/(73-91) 125/91  SpO2:  [98 %-100 %] 100 %    Intake/Output Summary (Last 24 hours) at 2/13/2023 1430  Last data filed at 2/13/2023 0659  Gross per 24 hour   Intake 756.25 ml   Output --   Net 756.25 ml     GEN: pleasant, no acute distress  CV: RRR, normal s1/s2, mild diastolic murmur consistent with PFO.   CHEST: clear to ausculation bilaterally, no rales or wheezing  EXTR: pulses 3+ in all extremities. No clubbing, cyanosis or edema.   NEURO: mild distress from dizziness when sitting up and laying back down  PSYCH: cooperative, affect appropriate, pleasant      Diagnostics:  All labs and imaging were reviewed, of note:    [facility]  Recent Labs   Lab 02/13/23  2130  02/12/23 2137 02/12/23  0817     --  130*   POTASSIUM 4.2  --  4.1   CHLORIDE 107  --  98   CO2 21*  --  24   ANIONGAP 10  --  8   GLC 87  --  88   BUN 15.1  --  18.4   CR 0.87 0.89 0.81   GFRESTIMATED 84 82 >90   GINNA 8.4*  --  9.0   PROTTOTAL  --   --  7.7   ALBUMIN  --   --  4.6   BILITOTAL  --   --  0.7   ALKPHOS  --   --  57   AST  --   --  20   ALT  --   --  16     CBC  Recent Labs   Lab 02/13/23  0624 02/12/23  0817   WBC 4.2 6.5   RBC 4.12 4.25   HGB 12.9 13.4   HCT 40.0 40.2   MCV 97 95   MCH 31.3 31.5   MCHC 32.3 33.3   RDW 12.6 12.4    235     INR  Recent Labs   Lab 02/12/23  0817   INR 1.01     Arterial Blood GasNo lab results found in last 7 days.    Lab Results   Component Value Date    TROPI <0.015 02/06/2020       EKG:  Sinus rhythm, normal EKG    CTA:  1.  Widely patent coronary arteries without evidence of  atherosclerosis or stenosis.  2.  Total Agatston score 0 placing the patient in the lowest  percentile when compared to an age- and gender-matched control group.  3.  The known patent foramen ovale is again visualized.   4.  Please review the separate Radiology report for incidental  noncardiac findings.     I saw and evaluated patient with CV fellow. I examined patient with CV fellow. I discussed the results with patient and CV fellow. I discussed our plan with patient and CV fellow.  I agree with CV fellow's note and I edited the CV fellow's note to make it a more comprehensive document.    Bijan Novoa MD, PhD  Professor of Medicine  Division of Cardiology      lisinopril-hydroCHLOROthiazide (PRINZIDE, ZESTORETIC) 20-25 mg per tablet TAKE 1 TAB BY MOUTH DAILY. PATIENT REQUIRES AN OFFICE VISIT FOR ADDITIONAL REFILLS. 90 Tab 1    lisinopril-hydroCHLOROthiazide (PRINZIDE, ZESTORETIC) 20-25 mg per tablet Take 1 Tab by mouth daily. Patient requires an office visit for additional refills. 90 Tab 1    upadacitinib (Rinvoq) 15 mg Tb24 Take 15 mg by mouth daily. 90 UNSPECIFIED 3    methotrexate (RHEUMATREX) 2.5 mg tablet Take 6 Tabs by mouth Every Saturday. Labs ASAP, and every 3 months 90 Tab 0    simvastatin (ZOCOR) 10 mg tablet TAKE 1 TABLET BY MOUTH EVERY NIGHT PATIENT REQUIRES AN OFFICE VISIT FOR ADDITIONAL REFILLS. 90 Tab 1    cloNIDine HCL (CATAPRES) 0.2 mg tablet TAKE 1 TABLET BY MOUTH EVERY NIGHT 90 Tab 1    folic acid (FOLVITE) 1 mg tablet Take 1 Tab by mouth daily. 90 Tab 5    metoprolol tartrate (LOPRESSOR) 25 mg tablet TAKE 1 TABLET BY MOUTH TWICE A DAY 60 Tab 6    calcium carbonate-vitamin D3 (CALTRATE 600 PLUS D) 600 mg (1,500 mg)-800 unit chew Take 1 Tab by mouth daily. 90 Tab 3    aspirin 81 mg chewable tablet Take 1 Tab by mouth daily. 30 Tab 11    metFORMIN (GLUCOPHAGE) 500 mg tablet Take 1 Tab by mouth daily (with breakfast).  90 Tab 3       ALLERGIES    Allergies   Allergen Reactions    Codeine Nausea and Vomiting       PHYSICAL EXAMINATION      Joint Count 3/2/2021 2/21/2020 1/22/2020 12/17/2019 11/19/2019   Patient pain (0-100) 35 75 65 50 75   MHAQ 0.375 0.875 1.125 1.375 1.75   Left shoulder - Tender 0 - - - -   Left shoulder - Swollen 0 - - - -   Left wrist- Tender - - 0 1 -   Left wrist- Swollen - 0 0 - -   Left 1st MCP - Tender - 0 - - -   Right shoulder - Tender 0 - - 1 -   Right shoulder - Swollen 0 - - 1 -   Right 3rd MCP - Tender - - - 1 -   Right 3rd MCP - Swollen - - - 1 -   Tender Joint Count (Total) 0 0 0 3 -   Swollen Joint Count (Total) 0 0 0 2 -   Physician Assessment (0-10) 2 5 4 9 -   Patient Assessment (0-10) 7.5 7 9 9 10   CDAI Total (calculated) 9.5 12 13 23 -     General:  The patient is well developed, well nourished, alert, and in no apparent distress. Eyes: Sclera are anicteric. No conjunctival injection. HEENT:  Oropharynx is clear. No oral ulcers. Adequate salivary pooling. No cervical or supraclavicular lymphadenopathy. Lungs:  Clear to auscultation bilaterally, without wheeze or stridor. Normal respiratory effort. Cor:  Regular rate and rhythm. No murmur rub or gallop. Abdomen: Soft, non-tender, without hepatomegaly or masses. Extremities: No calf tenderness or edema. Warm and well perfused. Skin:  No significant abnormalities. No nodules. No petechial, purpuric, or psoriaform rashes   Neuro: Nonfocal, no foot or wrist drop. Musculoskeletal:    A comprehensive musculoskeletal exam was performed for all joints of each upper and lower extremity and assessed for swelling, tenderness and range of motion. Results are documented as below:  No evidence of synovitis in the small joints of the hands, wrists, shoulders, elbows, hips, knees or ankles. Elbows stably limited from full extension by 5deg  Bilateral wrist near-fusion, <5deg flexion/extension  Right MCP3-5 fixed flexion deformities to 90deg  Left ankle small effusion without warmth or tenderness  Bilateral hammertoes left > right, no MTP squeeze tenderness          DATA REVIEW    Prior medical records were reviewed and if applicable are summarized as below:    Labs:   2/2020: Cbc, cmp unremarkable, lipids normal  12/2019: Cbc, cmp unremarkable  10/2019: Quant gold, HBV, HCV negative, RF, CCP positive, ESR 61, CRP, CMP normal  7/2019: cbc, CMP, TSH normal    Imaging:   Hand x-rays (10/2019): Personally reviewed images. + severe erosive changes  Foot x-rays (10/2019): Personally reviewed images.  + erosive and DJD changes    ASSESSMENT AND PLAN    A 63 y.o. female with hx of CVA with right side hemiparesis, DM, seropositive erosive rheumatoid arthritis on methotrexate 17.5 mg oral weekly with daily folic acid, Rinvoq 15 mg oral daily presents for a follow up visit. She continues to do well on the lower methotrexate, will continue to taper toward 12.5mg weekly given significant risk of shingles with combination therapy. She is aware she is overdue for lab monitoring and plans to go for this later today--will not be able to provide further refills until she does. # Seropositive erosive rheumatoid arthritis:  - Reduce methotrexate to 15mg oral weekly, and in 1 month if doing well then to 12.5mg weekly  - daily folic acid  - continue Rinvoq 15 mg oral daily  - Has not yet gone for plain films ordered last visit    # Adhesive capsulitis of right shoulder:  - right hemiparesis most likely contributing  - still with pain  - Not interested in steroid injection. # Medication Toxicity Monitoring:  - cbc, cmp, lipids reordered--overdue for labs, no more refills until done. - Hepatitis B, C: negative 10/2019  - Quant gold: negative 10/2019  - Immunizations: We discussed receiving Shingrix, COVID, influenza, Prevar-13, and Pneumovax-23 vaccines as per the CDC guidelines for immunosuppressed patients. - bone health: caltrate prescribed     Patient Instructions   1. Decrease methotrexate to 6 pills (15mg) once a week. In 4 weeks, if you're still doing well in terms of your joints, go ahead and decrease your dose to 5 pills (12.5mg) weekly. Continue folic acid and Rinvoq daily. 2. Labs ASAP and again in 3 months, before next visit. 3. Talk to your PCP about getting the shingles vaccine, since your current regimen increases your risk of this. 4. Return in 4-5 months. RTC in 4-5 months    The patient voiced understanding of the aforementioned assessment and plan. Summary of plan was provided in the After Visit Summary patient instructions. I also provided education about MyChart setup and utility.     Ms. Michelle Brandon has a reminder for a \"due or due soon\" health maintenance. I have asked that she contact her primary care provider for follow-up on this health maintenance. TODAY'S ORDERS    Orders Placed This Encounter    C REACTIVE PROTEIN, QT    CBC WITH AUTOMATED DIFF    METABOLIC PANEL, COMPREHENSIVE    SED RATE (ESR)    LIPID PANEL       Future Appointments   Date Time Provider Kaur Herzog   8/3/2021  9:20 AM Maceo Claude, MD [facility] BS AMB     We discussed the expected course, resolution and complications of the diagnosis(es) in detail. Medication risks, benefits, costs, interactions, and alternatives were discussed as indicated. I advised her to contact the office if her condition worsens, changes or fails to improve as anticipated. She expressed understanding with the diagnosis(es) and plan.      Face to face time: 35 minutes  Note preparation and records review day of service: 8 minutes  Total provider time day of service: 43 minutes      Yen Banks MD    Adult Rheumatology   [id number] [facility], [address] [place] [address]   Phone [phone]  Fax [phone]

## 2023-02-20 ENCOUNTER — TELEPHONE (OUTPATIENT)
Dept: RHEUMATOLOGY | Age: 68
End: 2023-02-20

## 2023-02-20 ENCOUNTER — HOSPITAL ENCOUNTER (OUTPATIENT)
Dept: INFUSION THERAPY | Age: 68
Discharge: HOME | End: 2023-02-20
Payer: MEDICARE

## 2023-02-20 VITALS
DIASTOLIC BLOOD PRESSURE: 80 MMHG | SYSTOLIC BLOOD PRESSURE: 134 MMHG | WEIGHT: 111.6 LBS | HEART RATE: 68 BPM | OXYGEN SATURATION: 100 % | RESPIRATION RATE: 16 BRPM | BODY MASS INDEX: 21.8 KG/M2 | TEMPERATURE: 98.4 F

## 2023-02-20 DIAGNOSIS — T78.3XXA ANGIOEDEMA, INITIAL ENCOUNTER: ICD-10-CM

## 2023-02-20 DIAGNOSIS — M05.9 SEROPOSITIVE RHEUMATOID ARTHRITIS (HCC): Primary | ICD-10-CM

## 2023-02-20 LAB
ALBUMIN SERPL-MCNC: 3.2 G/DL (ref 3.5–5)
ALBUMIN/GLOB SERPL: 0.9 (ref 1.1–2.2)
ALP SERPL-CCNC: 88 U/L (ref 45–117)
ALT SERPL-CCNC: 18 U/L (ref 12–78)
ANION GAP SERPL CALC-SCNC: 9 MMOL/L (ref 5–15)
AST SERPL-CCNC: 24 U/L (ref 15–37)
BASOPHILS # BLD: 0 K/UL (ref 0–0.1)
BASOPHILS NFR BLD: 0 % (ref 0–1)
BILIRUB SERPL-MCNC: 0.5 MG/DL (ref 0.2–1)
BUN SERPL-MCNC: 25 MG/DL (ref 6–20)
BUN/CREAT SERPL: 27 (ref 12–20)
CALCIUM SERPL-MCNC: 8.3 MG/DL (ref 8.5–10.1)
CHLORIDE SERPL-SCNC: 104 MMOL/L (ref 97–108)
CO2 SERPL-SCNC: 25 MMOL/L (ref 21–32)
CREAT SERPL-MCNC: 0.93 MG/DL (ref 0.55–1.02)
CRP SERPL-MCNC: <0.29 MG/DL (ref 0–0.6)
DIFFERENTIAL METHOD BLD: ABNORMAL
EOSINOPHIL # BLD: 0.1 K/UL (ref 0–0.4)
EOSINOPHIL NFR BLD: 2 % (ref 0–7)
ERYTHROCYTE [DISTWIDTH] IN BLOOD BY AUTOMATED COUNT: 14.2 % (ref 11.5–14.5)
ERYTHROCYTE [SEDIMENTATION RATE] IN BLOOD: 47 MM/HR (ref 0–30)
GLOBULIN SER CALC-MCNC: 3.7 G/DL (ref 2–4)
GLUCOSE SERPL-MCNC: 203 MG/DL (ref 65–100)
HCT VFR BLD AUTO: 34.5 % (ref 35–47)
HGB BLD-MCNC: 11 G/DL (ref 11.5–16)
IMM GRANULOCYTES # BLD AUTO: 0 K/UL (ref 0–0.04)
IMM GRANULOCYTES NFR BLD AUTO: 0 % (ref 0–0.5)
LYMPHOCYTES # BLD: 2.1 K/UL (ref 0.8–3.5)
LYMPHOCYTES NFR BLD: 31 % (ref 12–49)
MCH RBC QN AUTO: 29.6 PG (ref 26–34)
MCHC RBC AUTO-ENTMCNC: 31.9 G/DL (ref 30–36.5)
MCV RBC AUTO: 93 FL (ref 80–99)
MONOCYTES # BLD: 0.4 K/UL (ref 0–1)
MONOCYTES NFR BLD: 6 % (ref 5–13)
NEUTS SEG # BLD: 4.1 K/UL (ref 1.8–8)
NEUTS SEG NFR BLD: 61 % (ref 32–75)
NRBC # BLD: 0 K/UL (ref 0–0.01)
NRBC BLD-RTO: 0 PER 100 WBC
PLATELET # BLD AUTO: 292 K/UL (ref 150–400)
PMV BLD AUTO: 10.1 FL (ref 8.9–12.9)
POTASSIUM SERPL-SCNC: 4.5 MMOL/L (ref 3.5–5.1)
PROT SERPL-MCNC: 6.9 G/DL (ref 6.4–8.2)
RBC # BLD AUTO: 3.71 M/UL (ref 3.8–5.2)
SODIUM SERPL-SCNC: 138 MMOL/L (ref 136–145)
WBC # BLD AUTO: 6.7 K/UL (ref 3.6–11)

## 2023-02-20 PROCEDURE — 96375 TX/PRO/DX INJ NEW DRUG ADDON: CPT

## 2023-02-20 PROCEDURE — 85025 COMPLETE CBC W/AUTO DIFF WBC: CPT

## 2023-02-20 PROCEDURE — 36415 COLL VENOUS BLD VENIPUNCTURE: CPT

## 2023-02-20 PROCEDURE — 96415 CHEMO IV INFUSION ADDL HR: CPT

## 2023-02-20 PROCEDURE — 86140 C-REACTIVE PROTEIN: CPT

## 2023-02-20 PROCEDURE — 96413 CHEMO IV INFUSION 1 HR: CPT

## 2023-02-20 PROCEDURE — 74011000250 HC RX REV CODE- 250: Performed by: INTERNAL MEDICINE

## 2023-02-20 PROCEDURE — 74011250637 HC RX REV CODE- 250/637: Performed by: INTERNAL MEDICINE

## 2023-02-20 PROCEDURE — 85652 RBC SED RATE AUTOMATED: CPT

## 2023-02-20 PROCEDURE — 80053 COMPREHEN METABOLIC PANEL: CPT

## 2023-02-20 PROCEDURE — 74011250636 HC RX REV CODE- 250/636: Performed by: INTERNAL MEDICINE

## 2023-02-20 RX ORDER — ONDANSETRON 2 MG/ML
8 INJECTION INTRAMUSCULAR; INTRAVENOUS AS NEEDED
OUTPATIENT
Start: 2023-04-09

## 2023-02-20 RX ORDER — HYDROCORTISONE SODIUM SUCCINATE 100 MG/2ML
100 INJECTION, POWDER, FOR SOLUTION INTRAMUSCULAR; INTRAVENOUS AS NEEDED
OUTPATIENT
Start: 2023-04-09

## 2023-02-20 RX ORDER — SODIUM CHLORIDE 0.9 % (FLUSH) 0.9 %
5-40 SYRINGE (ML) INJECTION AS NEEDED
OUTPATIENT
Start: 2023-04-09

## 2023-02-20 RX ORDER — SODIUM CHLORIDE 9 MG/ML
5-250 INJECTION, SOLUTION INTRAVENOUS AS NEEDED
OUTPATIENT
Start: 2023-04-09

## 2023-02-20 RX ORDER — DIPHENHYDRAMINE HYDROCHLORIDE 50 MG/ML
25 INJECTION, SOLUTION INTRAMUSCULAR; INTRAVENOUS AS NEEDED
Start: 2023-04-09

## 2023-02-20 RX ORDER — HEPARIN 100 UNIT/ML
500 SYRINGE INTRAVENOUS AS NEEDED
Start: 2023-04-09

## 2023-02-20 RX ORDER — SODIUM CHLORIDE 9 MG/ML
5-40 INJECTION INTRAVENOUS AS NEEDED
OUTPATIENT
Start: 2023-04-09

## 2023-02-20 RX ORDER — PREDNISONE 20 MG/1
40 TABLET ORAL
Qty: 10 TABLET | Refills: 0 | Status: SHIPPED | OUTPATIENT
Start: 2023-02-20 | End: 2023-02-25

## 2023-02-20 RX ORDER — ACETAMINOPHEN 325 MG/1
650 TABLET ORAL ONCE
Start: 2023-04-09 | End: 2023-04-09

## 2023-02-20 RX ORDER — DIPHENHYDRAMINE HCL 25 MG
50 CAPSULE ORAL ONCE
OUTPATIENT
Start: 2023-04-09 | End: 2023-04-09

## 2023-02-20 RX ORDER — ALBUTEROL SULFATE 0.83 MG/ML
2.5 SOLUTION RESPIRATORY (INHALATION) AS NEEDED
Start: 2023-04-09

## 2023-02-20 RX ORDER — DIPHENHYDRAMINE HYDROCHLORIDE 50 MG/ML
INJECTION, SOLUTION INTRAMUSCULAR; INTRAVENOUS
Status: DISCONTINUED
Start: 2023-02-20 | End: 2023-02-21 | Stop reason: HOSPADM

## 2023-02-20 RX ORDER — EPINEPHRINE 1 MG/ML
0.3 INJECTION, SOLUTION, CONCENTRATE INTRAVENOUS AS NEEDED
OUTPATIENT
Start: 2023-04-09

## 2023-02-20 RX ORDER — DIPHENHYDRAMINE HYDROCHLORIDE 50 MG/ML
25 INJECTION, SOLUTION INTRAMUSCULAR; INTRAVENOUS AS NEEDED
Status: DISCONTINUED | OUTPATIENT
Start: 2023-02-20 | End: 2023-02-21 | Stop reason: HOSPADM

## 2023-02-20 RX ORDER — DIPHENHYDRAMINE HYDROCHLORIDE 50 MG/ML
50 INJECTION, SOLUTION INTRAMUSCULAR; INTRAVENOUS AS NEEDED
Start: 2023-04-09

## 2023-02-20 RX ORDER — DIPHENHYDRAMINE HCL 25 MG
50 CAPSULE ORAL ONCE
Status: COMPLETED | OUTPATIENT
Start: 2023-02-20 | End: 2023-02-20

## 2023-02-20 RX ORDER — SODIUM CHLORIDE 9 MG/ML
5-250 INJECTION, SOLUTION INTRAVENOUS AS NEEDED
Status: DISPENSED | OUTPATIENT
Start: 2023-02-20 | End: 2023-02-20

## 2023-02-20 RX ORDER — ACETAMINOPHEN 325 MG/1
650 TABLET ORAL ONCE
Status: COMPLETED | OUTPATIENT
Start: 2023-02-20 | End: 2023-02-20

## 2023-02-20 RX ORDER — ACETAMINOPHEN 325 MG/1
650 TABLET ORAL AS NEEDED
Start: 2023-04-09

## 2023-02-20 RX ORDER — SODIUM CHLORIDE 0.9 % (FLUSH) 0.9 %
5-40 SYRINGE (ML) INJECTION AS NEEDED
Status: DISPENSED | OUTPATIENT
Start: 2023-02-20 | End: 2023-02-20

## 2023-02-20 RX ORDER — CETIRIZINE HYDROCHLORIDE 10 MG/1
10 TABLET ORAL 2 TIMES DAILY
Qty: 30 TABLET | Refills: 0 | Status: SHIPPED | OUTPATIENT
Start: 2023-02-20

## 2023-02-20 RX ADMIN — INFLIXIMAB 200 MG: 100 INJECTION, POWDER, LYOPHILIZED, FOR SOLUTION INTRAVENOUS at 11:08

## 2023-02-20 RX ADMIN — DIPHENHYDRAMINE HYDROCHLORIDE 25 MG: 50 INJECTION, SOLUTION INTRAMUSCULAR; INTRAVENOUS at 12:48

## 2023-02-20 RX ADMIN — DIPHENHYDRAMINE HYDROCHLORIDE 50 MG: 25 CAPSULE ORAL at 10:28

## 2023-02-20 RX ADMIN — SODIUM CHLORIDE 25 ML/HR: 9 INJECTION, SOLUTION INTRAVENOUS at 11:06

## 2023-02-20 RX ADMIN — Medication 10 ML: at 10:18

## 2023-02-20 RX ADMIN — SODIUM CHLORIDE 250 ML: 9 INJECTION, SOLUTION INTRAVENOUS at 12:44

## 2023-02-20 RX ADMIN — ACETAMINOPHEN 650 MG: 325 TABLET ORAL at 10:28

## 2023-02-20 NOTE — PROGRESS NOTES
OPIC Short Note                       Date: 2023    Name: Alla Hidalgo    MRN: [id number]         : 1955    Treatment: inflixamab    [facility]IC COVID-19 SCREENING      The patient was asked the following questions and answered as documented below:    Do you have any symptoms of COVID-19? SOB, coughing, fever, or generally not feeling well? NO  Have you been exposed to COVID-19 recently? NO  Have you had any recent contact with family/friend that has a pending COVID test? NO      Follow Up: Proceed with treatment    Ms. Bandar Llanos was assessed and education was provided. No complaints voiced. Problem: Knowledge Deficit  Goal: *Verbalizes understanding of procedures and medications  Outcome: Progressing Towards Goal     Problem: Patient Education:  Go to Education Activity  Goal: Patient/Family Education  Outcome: Progressing Towards Goal    Lines: 24 gauge IV placed to the [facility], labs drawn and processed. Peripheral IV 23 Anterior;Proximal;Right Forearm (Active)   Site Assessment Clean, dry, & intact 23 1017   Phlebitis Assessment 0 23 1017   Infiltration Assessment 0 23 1017   Dressing Status Clean, dry, & intact;New;Occlusive 23 1017   Dressing Type Transparent 23 1017   Hub Color/Line Status Yellow; Flushed;Patent 23 1017   Action Taken Blood drawn 23 1017   Alcohol Cap Used Yes 23 1017        Ms. Ngo vitals were reviewed prior to and after treatment. Patient Vitals for the past 12 hrs:   Temp Pulse Resp BP SpO2   23 1346 -- 68 -- 134/80 100 %   23 1240 -- 67 -- 130/81 99 %   23 1006 98.4 °F (36.9 °C) 73 16 108/64 100 %         Lab results were obtained and reviewed.   Recent Results (from the past 12 hour(s))   CBC WITH AUTOMATED DIFF    Collection Time: 23 10:19 AM   Result Value Ref Range    WBC 6.7 3.6 - 11.0 K/uL    RBC 3.71 (L) 3.80 - 5.20 M/uL    HGB 11.0 (L) 11.5 - 16.0 g/dL    HCT 34.5 (L) 35.0 - 47.0 %    MCV 93.0 80.0 - 99.0 FL    MCH 29.6 26.0 - 34.0 PG    MCHC 31.9 30.0 - 36.5 g/dL    RDW 14.2 11.5 - 14.5 %    PLATELET 130 577 - 080 K/uL    MPV 10.1 8.9 - 12.9 FL    NRBC 0.0 0  WBC    ABSOLUTE NRBC 0.00 0.00 - 0.01 K/uL    NEUTROPHILS 61 32 - 75 %    LYMPHOCYTES 31 12 - 49 %    MONOCYTES 6 5 - 13 %    EOSINOPHILS 2 0 - 7 %    BASOPHILS 0 0 - 1 %    IMMATURE GRANULOCYTES 0 0.0 - 0.5 %    ABS. NEUTROPHILS 4.1 1.8 - 8.0 K/UL    ABS. LYMPHOCYTES 2.1 0.8 - 3.5 K/UL    ABS. MONOCYTES 0.4 0.0 - 1.0 K/UL    ABS. EOSINOPHILS 0.1 0.0 - 0.4 K/UL    ABS. BASOPHILS 0.0 0.0 - 0.1 K/UL    ABS. IMM.  GRANS. 0.0 0.00 - 0.04 K/UL    DF AUTOMATED     SED RATE (ESR)    Collection Time: 02/20/23 10:19 AM   Result Value Ref Range    Sed rate, automated 47 (H) 0 - 30 mm/hr       Medications given:  Medications Administered       0.9% sodium chloride infusion       Admin Date  02/20/2023 Action  New Bag Dose  25 mL/hr Rate  25 mL/hr Route  IntraVENous Administered By  Marty North RN              acetaminophen (TYLENOL) tablet 650 mg       Admin Date  02/20/2023 Action  Given Dose  650 mg Route  Oral Administered By  Marty North RN              diphenhydrAMINE (BENADRYL) capsule 50 mg       Admin Date  02/20/2023 Action  Given Dose  50 mg Route  Oral Administered By  Marty North RN              diphenhydrAMINE (BENADRYL) injection 25 mg       Admin Date  02/20/2023 Action  Given Dose  25 mg Route  IntraVENous Administered By  Marty North RN              inFLIXimab (REMICADE) 200 mg in 0.9% sodium chloride 250 mL, overfill volume 25 mL infusion       Admin Date  02/20/2023 Action  New Bag Dose  200 mg Route  IntraVENous Administered By  Marty North RN              sodium chloride (NS) flush 5-40 mL       Admin Date  02/20/2023 Action  Given Dose  10 mL Route  IntraVENous Administered By  Marty North RN              sodium chloride 0.9 % bolus infusion 500 mL       Admin Date  02/20/2023 Action  New Bag Dose  250 mL Rate  100 mL/hr Route  IntraVENous Administered By  Ny Roy RN                  1235: 87 mins into the infusion, patient c/o lip swelling. Left side the patient's mouth is swollen, patient denies any swelling inside her mouth or her throat, she denies any difficulty swallowing. Infusion stopped. VSS, sats good on RA.   1244: saline hung  1248: IV benadryl given  1310: spoke with MD, instructions from MD provided to patient (see AVS). Md provided with a picture of patient's lip via [facility] hospitals email. Ms. Saray Vogel patient observed for one hour post reaction. VSS, IV flushed and removed. Ms. Saray Vogel was discharged from [place] [address] in stable condition at 1350. Patient scheduled to see MD on this Wednesday.      Patient provided with AVS , which includes future appointment and written educational material.     Future Appointments   Date Time Provider Kaur Herzog   2/22/2023  3:40 PM Darlen Cranker, MD [facility] BS AMB   4/20/2023 10:00 AM [facility] INFUSION NURSE 2 [address] [place] [address]   6/20/2023 10:00 AM [facility] [place] INFUSION NURSE [address] [place] [address]       Apurva Perez RN  February 20, 2023  10:48 AM

## 2023-02-20 NOTE — DISCHARGE INSTRUCTIONS
Instructions per Dr. Patty Bliss: Because you had lip swelling while receiving your Remicade infusion today    Do not take your Lisinopril until you have spoke to your primary care physician about your lip swelling while in the infusion center today. Two prescriptions will be sent to your pharmacy on file:    Zyrtex, 1 pill, twice a day for 5 days. Prednisone 40mg a day for 5 days. infliximab  Pron[facility]  [facility]  Brand:  Avsola, Inflectra, Remicade, Renflexis  What is the most important information I should know about infliximab? Using infliximab may increase your risk of developing certain types of cancer, including a rare fast-growing type of lymphoma that can be fatal. Ask your doctor about your specific risk. Infliximab affects your immune system. You may get infections more easily, even serious or fatal infections. Before you start using infliximab, your doctor may perform tests to make sure you do not have certain infections. Call your doctor if you have a fever, tiredness, flu symptoms, cough, or skin sores. What is infliximab? Infliximab is used to treat rheumatoid arthritis, psoriatic arthritis, ankylosing spondylitis, and severe or disabling plaque psoriasis in adults. Infliximab is also used to treat ulcerative colitis or Crohn's disease in adults and children at least 10years old. Infliximab is often used when other medicines have not been effective. Infliximab may also be used for purposes not listed in this medication guide. What should I discuss with my healthcare provider before receiving infliximab? You should not be treated with infliximab if you are allergic to it. Tell your doctor if you have ever had tuberculosis (TB) or if anyone in your household has tuberculosis. Also tell your doctor if you have recently traveled. Tuberculosis and some fungal infections are more common in certain parts of the world, and you may have been exposed during travel.   Tell your doctor if you have ever had:  an active infection (fever, cough, flu symptoms, open sores or skin wounds);  heart failure or other heart problems;  diabetes;  a weak immune system;  liver failure, hepatitis B, or other liver problems;  chronic obstructive pulmonary disease (COPD);  heart problems;  cancer;  seizures;  numbness or tingling anywhere in your body;  a nerve-muscle disorder, such as multiple sclerosis or Guillain-Barré syndrome;  phototherapy for psoriasis;  vaccination with BCG (Bacille Calmette-Guérin); or  if you are scheduled to receive any vaccines. Make sure your child is current on all vaccines before he or she starts treatment with infliximab. Infliximab may cause a rare type of lymphoma (cancer) of the liver, spleen, and bone marrow that can be fatal. This has occurred mainly in teenagers and young men with Crohn's disease or ulcerative colitis. However, anyone with an inflammatory autoimmune disorder may have a higher risk of lymphoma. Talk with your doctor about your own risk. Infliximab may cause other types of cancer, such as skin cancer or cancer of the cervix. Ask your doctor about this risk. If you use infliximab while you are pregnant, make sure any doctor caring for your new baby knows that you used the medicine during pregnancy. Being exposed to infliximab in the womb could affect your baby's vaccination schedule during the first 6 months of life. You should not breastfeed while you are receiving infliximab. Infliximab is not for use in children younger than 10years old. How is infliximab given? Before you start treatment with infliximab, your doctor may perform tests to make sure you do not have tuberculosis or other infections. Infliximab is given as an infusion into a vein. A healthcare provider will give you this injection. This medicine must be given slowly, and the infusion can take at least 2 hours to complete.   You may be watched closely after receiving infliximab, to make sure the medicine has not caused any serious side effects. Infliximab affects your immune system. You may get infections more easily, even serious or fatal infections. Your doctor will need to examine you on a regular basis, and you may need frequent TB tests. Serious infections may be more likely in older adults. If you need surgery, tell the surgeon ahead of time that you are using infliximab. If you've ever had hepatitis B, using infliximab can cause this virus to become active or get worse. You may need frequent liver function tests while using this medicine and for several months after you stop. What happens if I miss a dose? Call your doctor for instructions if you miss an appointment for your infliximab injection. What happens if I overdose? Seek emergency medical attention or call the [employer] Help line at 1-360.946.1756. What should I avoid while receiving infliximab? Avoid activities that may increase your risk of bleeding injury. Do not receive a \"live\" vaccine while using infliximab, or you could develop a serious infection. Live vaccines include measles, mumps, rubella (MMR), polio, rotavirus, typhoid, yellow fever, varicella (chickenpox), and zoster (shingles). What are the possible side effects of infliximab? Get emergency medical help if you have signs of an allergic reaction: hives; chest pain, difficult breathing; fever, chills, severe dizziness; swelling of your face, lips, tongue, or throat. Some side effects may occur during the injection. Tell your caregiver if you feel dizzy, nauseated, light-headed, itchy or tingly, short of breath, or have a headache, fever, chills, muscle or joint pain, pain or tightness in your throat, chest pain, or trouble swallowing during the injection. Infusion reactions may also occur within 1 or 2 hours after injection. Serious and sometimes fatal infections may occur during treatment with infliximab.  Call your doctor right away if you have signs of infection such as: fever, extreme tiredness, flu symptoms, cough, or skin symptoms (pain, warmth, or redness). Also call your doctor if you have:  skin changes, new growths on the skin;  pale skin, easy bruising or bleeding;  delayed allergic reaction (up to 12 days after receiving infliximab) --fever, sore throat, trouble swallowing, headache, joint or muscle pain, skin rash, or swelling in your face or hands;  liver problems --right-sided upper stomach pain, loss of appetite, yellowing of your skin or eyes, and not feeling well;  lupus-like syndrome --joint pain or swelling, chest discomfort, feeling short of breath, skin rash on your cheeks or arms (worsens in sunlight);  nerve problems --numbness or tingling, problems with vision, weakness in your arms or legs, seizure;  new or worsening psoriasis --skin redness or scaly patches, raised bumps filled with pus;  signs of heart failure --shortness of breath with swelling of your ankles or feet, rapid weight gain;  signs of a stroke --sudden numbness or weakness, trouble speaking or understanding what is said to you, problems with vision or balance, severe headache;  signs of lymphoma --fever, night sweats, weight loss, stomach pain or swelling, chest pain, cough, trouble breathing, swollen glands (in your neck, armpits, or groin); or  signs of tuberculosis --fever, cough, night sweats, loss of appetite, weight loss, feeling constantly tired. Serious infections may be more likely in adults who are 72 years or older. Common side effects may include:  stuffy nose, sinus pain;  fever, chills, sore throat;  cough, chest pain, shortness of breath;  high or low blood pressure;  headache, feeling light-headed;  rash, itching; or  stomach pain. This is not a complete list of side effects and others may occur. Call your doctor for medical advice about side effects. You may report side effects to FDA at 6-039-FDA-0679. What other drugs will affect infliximab?   Tell your doctor about all your other medicines, especially:  abatacept;  anakinra;  tocilizumab;  any \"biologic\" medications to treat your condition--adalimumab, certolizumab, etanercept, golimumab, natalizumab, rituximab, and others; or  any other medicines to treat Crohn's disease, ulcerative colitis, rheumatoid arthritis, ankylosing spondylitis, psoriatic arthritis, or psoriasis. This list is not complete. Other drugs may affect infliximab, including prescription and over-the-counter medicines, vitamins, and herbal products. Not all possible drug interactions are listed here. Where can I get more information? Your pharmacist can provide more information about infliximab. Remember, keep this and all other medicines out of the reach of children, never share your medicines with others, and use this medication only for the indication prescribed. Every effort has been made to ensure that the information provided by [facility] is accurate, up-to-date, and complete, but no guarantee is made to that effect. Drug information contained herein may be time sensitive. [employer] information has been compiled for use by healthcare practitioners and consumers in the [place] and therefore [employer] does not warrant that uses outside of the [place] are appropriate, unless specifically indicated otherwise. [facility]'s drug information does not endorse drugs, diagnose patients or recommend therapy. [facility] drug information is an informational resource designed to assist licensed healthcare practitioners in caring for their patients and/or to serve consumers viewing this service as a supplement to, and not a substitute for, the expertise, skill, knowledge and judgment of healthcare practitioners. The absence of a warning for a given drug or drug combination in no way should be construed to indicate that the drug or drug combination is safe, effective or appropriate for any given patient.  [employer] does not assume any responsibility for any aspect of healthcare administered with the aid of information [facility] provides. The information contained herein is not intended to cover all possible uses, directions, precautions, warnings, drug interactions, allergic reactions, or adverse effects. If you have questions about the drugs you are taking, check with your doctor, nurse or pharmacist.  Copyright 8206-5699 [address]: 20.02. Revision date: 7/24/2020. Care instructions adapted under license by [employer] (which disclaims liability or warranty for this information). If you have questions about a medical condition or this instruction, always ask your healthcare professional. [place] [address] any warranty or liability for your use of this information.

## 2023-02-20 NOTE — TELEPHONE ENCOUNTER
Spoke to RN, patient with left-sided lip swelling, no respiratory distress or intraoral complaints, blood pressure stable. Prescribing high-dose Zyrtec and 40mg prednisone for the next 5 days, advised patient to also hold her HCTZ/lisinopril pill until she's spoken with her PCP.

## 2023-02-20 NOTE — TELEPHONE ENCOUNTER
Received message from [facility] stating patient had a reaction to Remicade infusions:     [12:52 PM] Benji Meal afternoon, I have Dr. Velma Zamudio patient here at the infusion center. Jw Blanco  59, about 2/3 through her Remicade infusion. Top lip is swollen, no c/o trouble swallowing. Sat 99% RA. I have attempted to call the office, it sent me to voicemail. Infusion stopped, 25 of IV benadryl given. Please have Nigel advise.  Thanks

## 2023-02-21 ENCOUNTER — TELEPHONE (OUTPATIENT)
Dept: RHEUMATOLOGY | Age: 68
End: 2023-02-21

## 2023-02-21 NOTE — TELEPHONE ENCOUNTER
Ayesha from [facility] called because  her wanted clarification  on the directions of the medication the was prescribed by the doctor. cetirizine.   A good phone number is [phone]

## 2023-02-22 NOTE — TELEPHONE ENCOUNTER
Returned call to pharmacy, left voicemail with physician name & npi, patient name & , name of medication, quantity, and confirmed sig of one tablet twice daily. Left phone number to call back for further questions.

## 2023-03-01 DIAGNOSIS — I10 HTN, GOAL BELOW 130/80: ICD-10-CM

## 2023-03-02 RX ORDER — METOPROLOL TARTRATE 25 MG/1
TABLET, FILM COATED ORAL
Qty: 60 TABLET | Refills: 0 | Status: SHIPPED | OUTPATIENT
Start: 2023-03-02

## 2023-03-16 DIAGNOSIS — E78.00 HYPERCHOLESTEREMIA: ICD-10-CM

## 2023-03-16 DIAGNOSIS — I69.354 HEMIPARESIS AFFECTING LEFT SIDE AS LATE EFFECT OF CEREBROVASCULAR ACCIDENT (HCC): ICD-10-CM

## 2023-03-16 DIAGNOSIS — Z86.73 S/P STROKE DUE TO CEREBROVASCULAR DISEASE: ICD-10-CM

## 2023-03-16 RX ORDER — SIMVASTATIN 10 MG/1
TABLET, FILM COATED ORAL
Qty: 90 TABLET | Refills: 1 | Status: SHIPPED | OUTPATIENT
Start: 2023-03-16

## 2023-03-23 ENCOUNTER — OFFICE VISIT (OUTPATIENT)
Dept: RHEUMATOLOGY | Age: 68
End: 2023-03-23
Payer: MEDICARE

## 2023-03-23 VITALS
DIASTOLIC BLOOD PRESSURE: 95 MMHG | HEART RATE: 70 BPM | OXYGEN SATURATION: 98 % | BODY MASS INDEX: 22.58 KG/M2 | SYSTOLIC BLOOD PRESSURE: 167 MMHG | RESPIRATION RATE: 16 BRPM | WEIGHT: 115.6 LBS | TEMPERATURE: 98.5 F

## 2023-03-23 DIAGNOSIS — T78.3XXD ANGIOEDEMA, SUBSEQUENT ENCOUNTER: ICD-10-CM

## 2023-03-23 DIAGNOSIS — M05.9 SEROPOSITIVE RHEUMATOID ARTHRITIS (HCC): Primary | ICD-10-CM

## 2023-03-23 DIAGNOSIS — Z79.899 ONGOING USE OF POSSIBLY TOXIC MEDICATION: ICD-10-CM

## 2023-03-23 PROCEDURE — 1090F PRES/ABSN URINE INCON ASSESS: CPT | Performed by: INTERNAL MEDICINE

## 2023-03-23 PROCEDURE — G8510 SCR DEP NEG, NO PLAN REQD: HCPCS | Performed by: INTERNAL MEDICINE

## 2023-03-23 PROCEDURE — 1123F ACP DISCUSS/DSCN MKR DOCD: CPT | Performed by: INTERNAL MEDICINE

## 2023-03-23 PROCEDURE — 1101F PT FALLS ASSESS-DOCD LE1/YR: CPT | Performed by: INTERNAL MEDICINE

## 2023-03-23 PROCEDURE — G9899 SCRN MAM PERF RSLTS DOC: HCPCS | Performed by: INTERNAL MEDICINE

## 2023-03-23 PROCEDURE — G8399 PT W/DXA RESULTS DOCUMENT: HCPCS | Performed by: INTERNAL MEDICINE

## 2023-03-23 PROCEDURE — 3077F SYST BP >= 140 MM HG: CPT | Performed by: INTERNAL MEDICINE

## 2023-03-23 PROCEDURE — 99215 OFFICE O/P EST HI 40 MIN: CPT | Performed by: INTERNAL MEDICINE

## 2023-03-23 PROCEDURE — G8420 CALC BMI NORM PARAMETERS: HCPCS | Performed by: INTERNAL MEDICINE

## 2023-03-23 PROCEDURE — 3017F COLORECTAL CA SCREEN DOC REV: CPT | Performed by: INTERNAL MEDICINE

## 2023-03-23 PROCEDURE — G8427 DOCREV CUR MEDS BY ELIG CLIN: HCPCS | Performed by: INTERNAL MEDICINE

## 2023-03-23 PROCEDURE — 3080F DIAST BP >= 90 MM HG: CPT | Performed by: INTERNAL MEDICINE

## 2023-03-23 PROCEDURE — G8536 NO DOC ELDER MAL SCRN: HCPCS | Performed by: INTERNAL MEDICINE

## 2023-03-23 RX ORDER — DIPHENHYDRAMINE HYDROCHLORIDE 50 MG/ML
50 INJECTION, SOLUTION INTRAMUSCULAR; INTRAVENOUS AS NEEDED
Start: 2023-03-28

## 2023-03-23 RX ORDER — ACETAMINOPHEN 325 MG/1
650 TABLET ORAL AS NEEDED
Start: 2023-03-28

## 2023-03-23 RX ORDER — HEPARIN 100 UNIT/ML
500 SYRINGE INTRAVENOUS AS NEEDED
Start: 2023-03-28

## 2023-03-23 RX ORDER — SODIUM CHLORIDE 9 MG/ML
5-250 INJECTION, SOLUTION INTRAVENOUS AS NEEDED
OUTPATIENT
Start: 2023-03-28

## 2023-03-23 RX ORDER — EPINEPHRINE 1 MG/ML
0.3 INJECTION, SOLUTION, CONCENTRATE INTRAVENOUS AS NEEDED
OUTPATIENT
Start: 2023-03-28

## 2023-03-23 RX ORDER — ALBUTEROL SULFATE 0.83 MG/ML
2.5 SOLUTION RESPIRATORY (INHALATION) AS NEEDED
Start: 2023-03-28

## 2023-03-23 RX ORDER — HYDROCORTISONE SODIUM SUCCINATE 100 MG/2ML
100 INJECTION, POWDER, FOR SOLUTION INTRAMUSCULAR; INTRAVENOUS AS NEEDED
OUTPATIENT
Start: 2023-03-28

## 2023-03-23 RX ORDER — DIPHENHYDRAMINE HYDROCHLORIDE 50 MG/ML
25 INJECTION, SOLUTION INTRAMUSCULAR; INTRAVENOUS AS NEEDED
Start: 2023-03-28

## 2023-03-23 RX ORDER — SODIUM CHLORIDE 0.9 % (FLUSH) 0.9 %
5-40 SYRINGE (ML) INJECTION AS NEEDED
OUTPATIENT
Start: 2023-03-28

## 2023-03-23 RX ORDER — ONDANSETRON 2 MG/ML
8 INJECTION INTRAMUSCULAR; INTRAVENOUS AS NEEDED
OUTPATIENT
Start: 2023-03-28

## 2023-03-23 RX ORDER — SODIUM CHLORIDE 9 MG/ML
5-40 INJECTION INTRAVENOUS AS NEEDED
OUTPATIENT
Start: 2023-03-28

## 2023-03-23 NOTE — PROGRESS NOTES
Chief Complaint   Patient presents with    Joint Pain     1. Have you been to the ER, urgent care clinic since your last visit? Hospitalized since your last visit? No    2. Have you seen or consulted any other health care providers outside of the [address] [place] [address] since your last visit? Include any pap smears or colon screening.  No

## 2023-03-23 NOTE — LETTER
3/23/2023    Patient: Ghanshyam Cohen   YOB: 1955   Date of Visit: 3/23/2023     MD Edwige Chow. 68.  [facility]    Dear Che Jarrett MD,    We recently saw Ms. Adelina Nuñez in the [facility] for evaluation. My notes for this consultation are attached. If you have questions, please do not hesitate to call me. I look forward to following your patient along with you.       Sincerely,    Saint Lava, MD S  Cell: [phone]

## 2023-03-23 NOTE — PATIENT INSTRUCTIONS
I don't think it's safe to continue your infliximab further. Because that worked well for you, I'm going to see if we can get an approval for Simponi or Cimzia instead, which work in a similar way but wouldn't have a risk of cross-over allergic reactions. We'll keep checking labs with infusions. Continue methotrexate 4 pills (10mg) weekly, and daily folic acid. Continue Pepcid as needed for reflux symptoms. Let Dr. Vaughan Cleverly know if your feel your breathing is consistently worsening. Return in 2 months. I am unfortunately leaving the practice and the [address] [facility] area after June; our practice will be sending out letters shortly with recommendations on area practices you can reach out to, as I'm afraid the chances they'll be able to get a provider in to take my place quickly are pretty low.

## 2023-03-23 NOTE — PROGRESS NOTES
Alondra Marie is a 74 y.o. female who was seen in-office on 3/23/2023 for followup. HISTORY OF DISEASE: Seropositive erosive rheumatoid arthritis    Year of diagnosis: 2019  First visit with this clinic: 10/2019  Cumulative disease manifestations: erosions  Positive serologies/labs: RF, CCP  Negative serologies/labs: Other co-morbidities: CVA with residual right sided hemiparesis, DM with neuropathy, HTN  Relapses:      Past treatment history:  Prednisone: prednisone 20->5mg tapered, 4/22- now  Non-biologic DMARD: Methotrexate 20 mg oral weekly (11/2019-->increased to 20 mg oral weekly in 2/2020 pt dc'd 7/21)  Biologic: Rinvoq 12/2019-6/2021, infliximab 5mg/kg q8wk (7/19/22- 2/23, angioedema)  Other:    HISTORY OF PRESENT ILLNESS     Pt returns for a follow up. LV 12/14/2022. After infusion 2/2/23:  \"Spoke to RN, patient with left-sided lip swelling, no respiratory distress or intraoral complaints, blood pressure stable. Prescribing high-dose Zyrtec and 40mg prednisone for the next 5 days, advised patient to also hold her HCTZ/lisinopril pill until she's spoken with her PCP. \"  Also prescribed prednisone 40mg daily x 5 days. Still feels her joints are doing well. No interval joint swelling. Still taking methotrexate 4 pills once a week, daily folic acid. Doesn't believe she is taking the etodolac these days, not finding she needs it. Breathing can feel \"heavy\" from time to time, often when just sitting still. No associated palpitations. Doesn't get bad tasting reflux like she used to, will take an occasional Pepcid OTC.       REVIEW OF SYSTEMS    Positives as per history  Negatives as follows:  Florecita Hummel:    Denies unexplained persistent fevers, weight change, chronic fatigue  HEAD/EYES:              Denies eye redness, blurry vision or sudden loss of vision, dry eyes, HA, temporal artery pain  ENT:                            Denies oral/nasal ulcers, recurrent sinus infections, dry mouth  RESPIRATORY:         No pleuritic pain, history of pleural effusions, hemoptysis, exertional dyspnea  CARDIOVASCULAR:             Denies chest pain, history of pericardial effusions  GASTRO:                    Denies heartburn, esophageal dysmotility, abdominal pain, nausea, vomiting, diarrhea, blood in the stool  HEMATOLOGIC:        No easy bruising, purpura, swollen lymph nodes  SKIN:                           Denies alopecia, ulcers, nodules, sun sensitivity, unexplained persistent rash   VASCULAR:                Denies edema, cyanosis, raynaud phenomenon  NEUROLOGIC:           Denies specific muscle weakness, paresthesias   PSYCHIATRIC:           No sleep disturbance / snoring, depression, anxiety  MSK:                           No morning stiffness >1 hour, SI joint pain, persistent joint swelling, persistent joint pain    PAST MEDICAL HISTORY    Past Medical History:   Diagnosis Date    Arthritis     hands/all over    Diabetes mellitus ([address] 6/15/2015    Dyspepsia and other specified disorders of function of stomach     Foot pain, bilateral 6/15/2015    Hemiparesis affecting left side as late effect of cerebrovascular accident ([address] 4/18/2014    Hypercholesteremia 4/18/2014    Hypertension     Knee pain, bilateral 6/15/2015    Lipoma of back 5/14/2015    Nausea & vomiting     Prediabetes 5/14/2015    S/P stroke due to cerebrovascular disease 4/18/2014    Screening for breast cancer 9/15/2015    Type II diabetes mellitus with nephropathy ([address] 7/23/2019        Past Surgical History:   Procedure Laterality Date    COLONOSCOPY N/A 10/17/2019    COLONOSCOPY performed by Shirley Wright MD at [facility] ENDOSCOPY    HX OTHER SURGICAL      lipoma removed from back     [address]  2004    hernia repair       FAMILY HISTORY    Family History   Problem Relation Age of Onset    Hypertension Mother     Diabetes Mother Arthritis-rheumatoid Father     Hypertension Brother     Stroke Brother        SOCIAL HISTORY    Social History     Tobacco Use    Smoking status: Former     Packs/day: 0.25     Years: 35.00     Pack years: 8.75     Types: Cigarettes     Quit date: 2009     Years since quittin.8    Smokeless tobacco: Never   Vaping Use    Vaping Use: Never used   Substance Use Topics    Alcohol use: No     Comment: ETOH abuse in past; quit     Drug use: No       MEDICATIONS    Current Outpatient Medications   Medication Sig Dispense Refill    simvastatin (ZOCOR) 10 mg tablet TAKE 1 TABLET BY MOUTH EVERY DAY AT NIGHT 90 Tablet 1    metoprolol tartrate (LOPRESSOR) 25 mg tablet TAKE 1 TABLET BY MOUTH TWICE A DAY 60 Tablet 0    cetirizine (ZYRTEC) 10 mg tablet Take 1 Tablet by mouth two (2) times a day. 30 Tablet 0    methotrexate (RHEUMATREX) 2.5 mg tablet Take 4 Tablets by mouth Every Saturday. 48 Tablet 0    folic acid (FOLVITE) 1 mg tablet Take 1 Tablet by mouth daily. 90 Tablet 5    predniSONE (DELTASONE) 10 mg tablet PLEASE SEE ATTACHED FOR DETAILED DIRECTIONS      inFLIXimab (REMICADE) 100 mg injection by IntraVENous route every thirty (30) days. cloNIDine HCL (CATAPRES) 0.2 mg tablet TAKE 1 TABLET BY MOUTH EVERY DAY AT NIGHT 90 Tablet 1    cyanocobalamin (Vitamin B-12) 1,000 mcg tablet Take 1 Tablet by mouth daily. 90 Tablet 3    lisinopril-hydroCHLOROthiazide (PRINZIDE, ZESTORETIC) 20-25 mg per tablet TAKE 1 TABLET BY MOUTH DAILY. PATIENT REQUIRES AN OFFICE VISIT FOR ADDITIONAL REFILLS. 90 Tablet 3    etodolac (LODINE) 400 mg tablet Take 1 Tablet by mouth two (2) times daily as needed for Pain. (Patient not taking: Reported on 10/20/2022) 40 Tablet 1    ergocalciferol (ERGOCALCIFEROL) 1,250 mcg (50,000 unit) capsule Take 1 Capsule by mouth every seven (7) days. Indications: vitamin D deficiency (high dose therapy) 12 Capsule 4    folic acid (FOLVITE) 1 mg tablet Take 1 Tab by mouth daily.  (Patient not taking: Reported on 10/20/2022) 90 Tab 5    metFORMIN (GLUCOPHAGE) 500 mg tablet Take 1 Tab by mouth daily (with breakfast). (Patient not taking: Reported on 10/20/2022) 90 Tab 3    calcium carbonate-vitamin D3 (CALTRATE 600 PLUS D) 600 mg (1,500 mg)-800 unit chew Take 1 Tab by mouth daily. (Patient not taking: Reported on 10/20/2022) 90 Tab 3    aspirin 81 mg chewable tablet Take 1 Tab by mouth daily. (Patient not taking: Reported on 10/20/2022) 30 Tab 11       ALLERGIES    Allergies   Allergen Reactions    Codeine Nausea and Vomiting       PHYSICAL EXAMINATION  Visit Vitals  BP (!) 167/95 (BP 1 Location: Right arm, BP Patient Position: Sitting, BP Cuff Size: Adult)   Pulse 70   Temp 98.5 °F (36.9 °C) (Oral)   Resp 16   Wt 115 lb 9.6 oz (52.4 kg)   LMP  (LMP Unknown)   SpO2 98%   BMI 22.58 kg/m²     General:  The patient is well developed, well nourished, alert, and in no apparent distress. Eyes: Sclera are anicteric. No conjunctival injection. HEENT:  Oropharynx is clear. No oral ulcers. Adequate salivary pooling. No cervical or supraclavicular lymphadenopathy. Lungs:  Clear to auscultation bilaterally, without wheeze or stridor. Normal respiratory effort. Cor:  Regular rate and rhythm. No murmurs rubs or gallops. Abdomen: Soft, non-tender, without hepatomegaly or masses. Extremities: No calf tenderness or edema. Warm and well perfused. Skin:  No significant abnormalities. No petechial, purpuric, or psoriaform rashes. Neuro: No foot or wrist drop, stable post-CVA weakness right hand. Musculoskeletal:    A comprehensive musculoskeletal exam was performed for all joints of each upper and lower extremity and assessed for swelling, tenderness and range of motion. Results are documented as below:   Stable 15 degree flexion deformity of R elbow. Interval resolved wrist tenderness but persistent bilateral left > right wrist synovitis. Crepitus in left elbow with 5 degree flexion deformity.   No return of synovitis of L wrist.   Trace left-sided MCP2-3 synovitis without tenderness  Unchanged right MCP3-5 fixed flexion deformities to 90deg without synovitis or tenderness. Stable bilateral knee crepitus without warmth or effusion. No right ankle synovitis currently. Bilateral left > right hammertoes. DATA REVIEW    Labs:   2/20/23: WBC 6.7, Hgb 11.0, Plt 292; ESR 47; Cr 0.93, Ca 8.3, LFTs WNL  10/27/22: UA cloudy appearance  10/20/22: TSH 0.957, HGB A1c 8, , Ferritin 86, Cr 0.86, LFT WNL, WBC 13.6, HGB 12.3, Plt 359,   10/10/22: ESR 37, CRP <0.29 mg/dL, WBC 7.9, HGB 11.1, Plt 279, Cr 0.86, Tbili 0.5, Alk phos 69, AST 46, ALT 27  8/2/22: CRP <0.29, , Cr 0.91, LFT WNL, WBC 8.4, HGB 10.9, Plt 292  5/5/22: Hgb A1c (POC) 6.4, Vit B12 215, QuantiFERON plus negative, ESR 35, Cr 0.68, LFT WNL, WBC 12.7, HGB 11.1, Plt 468, CRP 1 mg/L  11/3/21: CBC WNL, ESR 56; Cr 0.89, Tbili 0.4, AST 14, ALT 10, AlkP 122; CRP 3mg/L  3/2/21: CBC WNL, ESR 18, Cr 0.83, LFTs WNL, CRP <1mg/L  2/2020: Cbc, cmp unremarkable, lipids normal  12/2019: Cbc, cmp unremarkable  10/2019: Quant gold, HBV, HCV negative, RF, CCP positive, ESR 61, CRP, CMP normal  7/2019: cbc, CMP, TSH normal  5/14/15 Vit D 9.3    Imaging:   Hand xrays (11/3/21): On my review, extensive erosions and collapse of wrists and carpals. Evidence of mild osteoarthritic/osteopenic change involving the bones of the hand. Evidence of marked degenerative change/fusion of the carpal bones. Marked narrowing of the radiocarpal joint. Irregularity of the distal ulna. Hand x-rays (10/2019): Personally reviewed images. + severe erosive changes  Foot x-rays (10/2019): Personally reviewed images.  + erosive and DJD changes    ASSESSMENT AND PLAN  A 74 y.o. female with hx of CVA with right sided weakness and flexion deformity of the hand; DM, seropositive erosive rheumatoid arthritis with good symptomatic response to infliximab and methotrexate until her episode of angioedema with her last infusion. Zulema Vigiljay difficult to confidently distinguish between lisinopril vs infliximab reaction. Changing to Simponi infusion with loading dose, as she prefers infusions with difficulty self-injecting given very limited use of right hand post-CVA. 1. Seropositive rheumatoid arthritis ([address]  - Applying for simponi loading and maintenance. 2. Angioedema, subsequent encounter  - Would avoid infliximab and lisinopril going forward  - Encouraged pt to discuss antihypertensive adjustments as running high today    3. Ongoing use of possibly toxic medication  - Labs with infusions ordered        Patient Instructions   I don't think it's safe to continue your infliximab further. Because that worked well for you, I'm going to see if we can get an approval for Simponi or Cimzia instead, which work in a similar way but wouldn't have a risk of cross-over allergic reactions. We'll keep checking labs with infusions. Continue methotrexate 4 pills (10mg) weekly, and daily folic acid. Continue Pepcid as needed for reflux symptoms. Let Dr. Aishwarya Malik know if your feel your breathing is consistently worsening. Return in 2 months. I am unfortunately leaving the practice and the [facility] area after June; our practice will be sending out letters shortly with recommendations on area practices you can reach out to, as I'm afraid the chances they'll be able to get a provider in to take my place quickly are pretty low. TODAY'S ORDERS    No orders of the defined types were placed in this encounter.       Future Appointments   Date Time Provider Kaur Herzog   4/20/2023 10:00 AM [facility] INFUSION NURSE 2 [address] [place] [address]   6/20/2023 10:00 AM [facility] INFUSION NURSE 2 [facility] [employer]OLOG[facility]                                                                                                                                                              Face to face time: 20 minutes  Note preparation and records review day of service: 24 minutes  Total provider time day of service: 44 minutes    Adelina Garza MD    Adult Rheumatology   [facility]  A Part of [facility], [address]   Phone [phone]  Fax [phone]

## 2023-03-26 DIAGNOSIS — I69.354 HEMIPARESIS AFFECTING LEFT SIDE AS LATE EFFECT OF CEREBROVASCULAR ACCIDENT (HCC): ICD-10-CM

## 2023-03-26 DIAGNOSIS — Z86.73 S/P STROKE DUE TO CEREBROVASCULAR DISEASE: ICD-10-CM

## 2023-03-26 DIAGNOSIS — E78.00 HYPERCHOLESTEREMIA: ICD-10-CM

## 2023-03-27 RX ORDER — CLONIDINE HYDROCHLORIDE 0.2 MG/1
TABLET ORAL
Qty: 90 TABLET | Refills: 1 | Status: SHIPPED | OUTPATIENT
Start: 2023-03-27

## 2023-04-01 DIAGNOSIS — M05.9 SEROPOSITIVE RHEUMATOID ARTHRITIS (HCC): ICD-10-CM

## 2023-04-01 DIAGNOSIS — Z79.899 ONGOING USE OF POSSIBLY TOXIC MEDICATION: ICD-10-CM

## 2023-04-03 RX ORDER — METHOTREXATE 2.5 MG/1
10 TABLET ORAL
Qty: 48 TABLET | Refills: 0 | Status: SHIPPED | OUTPATIENT
Start: 2023-04-08

## 2023-04-05 RX ORDER — METOPROLOL TARTRATE 25 MG/1
TABLET, FILM COATED ORAL
Qty: 60 TABLET | Refills: 0 | Status: SHIPPED
Start: 2023-04-05

## 2023-04-18 ENCOUNTER — TELEPHONE (OUTPATIENT)
Dept: RHEUMATOLOGY | Age: 68
End: 2023-04-18

## 2023-04-18 ENCOUNTER — OFFICE VISIT (OUTPATIENT)
Dept: FAMILY MEDICINE CLINIC | Age: 68
End: 2023-04-18
Payer: MEDICARE

## 2023-04-18 VITALS
TEMPERATURE: 99.2 F | OXYGEN SATURATION: 96 % | SYSTOLIC BLOOD PRESSURE: 169 MMHG | RESPIRATION RATE: 16 BRPM | BODY MASS INDEX: 21.52 KG/M2 | HEART RATE: 86 BPM | HEIGHT: 61 IN | DIASTOLIC BLOOD PRESSURE: 96 MMHG | WEIGHT: 114 LBS

## 2023-04-18 DIAGNOSIS — R79.9 ABNORMAL FINDING OF BLOOD CHEMISTRY, UNSPECIFIED: ICD-10-CM

## 2023-04-18 DIAGNOSIS — E11.8 CONTROLLED DIABETES MELLITUS TYPE 2 WITH COMPLICATIONS, UNSPECIFIED WHETHER LONG TERM INSULIN USE (HCC): ICD-10-CM

## 2023-04-18 DIAGNOSIS — K51.018 ULCERATIVE PANCOLITIS WITH OTHER COMPLICATION (HCC): ICD-10-CM

## 2023-04-18 DIAGNOSIS — I69.354 HEMIPARESIS AFFECTING LEFT SIDE AS LATE EFFECT OF CEREBROVASCULAR ACCIDENT (HCC): ICD-10-CM

## 2023-04-18 DIAGNOSIS — D84.9 IMMUNOSUPPRESSION (HCC): Primary | ICD-10-CM

## 2023-04-18 PROCEDURE — G8399 PT W/DXA RESULTS DOCUMENT: HCPCS | Performed by: FAMILY MEDICINE

## 2023-04-18 PROCEDURE — 1101F PT FALLS ASSESS-DOCD LE1/YR: CPT | Performed by: FAMILY MEDICINE

## 2023-04-18 PROCEDURE — G8536 NO DOC ELDER MAL SCRN: HCPCS | Performed by: FAMILY MEDICINE

## 2023-04-18 PROCEDURE — 2022F DILAT RTA XM EVC RTNOPTHY: CPT | Performed by: FAMILY MEDICINE

## 2023-04-18 PROCEDURE — 1123F ACP DISCUSS/DSCN MKR DOCD: CPT | Performed by: FAMILY MEDICINE

## 2023-04-18 PROCEDURE — G8420 CALC BMI NORM PARAMETERS: HCPCS | Performed by: FAMILY MEDICINE

## 2023-04-18 PROCEDURE — 99213 OFFICE O/P EST LOW 20 MIN: CPT | Performed by: FAMILY MEDICINE

## 2023-04-18 PROCEDURE — G8510 SCR DEP NEG, NO PLAN REQD: HCPCS | Performed by: FAMILY MEDICINE

## 2023-04-18 PROCEDURE — 3078F DIAST BP <80 MM HG: CPT | Performed by: FAMILY MEDICINE

## 2023-04-18 PROCEDURE — G8427 DOCREV CUR MEDS BY ELIG CLIN: HCPCS | Performed by: FAMILY MEDICINE

## 2023-04-18 PROCEDURE — 3074F SYST BP LT 130 MM HG: CPT | Performed by: FAMILY MEDICINE

## 2023-04-18 PROCEDURE — 1090F PRES/ABSN URINE INCON ASSESS: CPT | Performed by: FAMILY MEDICINE

## 2023-04-18 PROCEDURE — 3017F COLORECTAL CA SCREEN DOC REV: CPT | Performed by: FAMILY MEDICINE

## 2023-04-18 PROCEDURE — 3046F HEMOGLOBIN A1C LEVEL >9.0%: CPT | Performed by: FAMILY MEDICINE

## 2023-04-18 PROCEDURE — G9899 SCRN MAM PERF RSLTS DOC: HCPCS | Performed by: FAMILY MEDICINE

## 2023-04-18 RX ORDER — AMLODIPINE BESYLATE 10 MG/1
10 TABLET ORAL DAILY
Qty: 30 TABLET | Refills: 5 | Status: SHIPPED | OUTPATIENT
Start: 2023-04-18

## 2023-04-18 NOTE — PROGRESS NOTES
Chief Complaint   Patient presents with    Hypertension    Medication Refill       1. \"Have you been to the ER, urgent care clinic since your last visit? Hospitalized since your last visit? \" No    2. \"Have you seen or consulted any other health care providers outside of the [address] [place] [address] since your last visit? \" No     3. For patients aged 39-70: Has the patient had a colonoscopy / FIT/ Cologuard? Yes - no Care Gap present      If the patient is female:    4. For patients aged 41-77: Has the patient had a mammogram within the past 2 years? Yes - no Care Gap present      5. For patients aged 21-65: Has the patient had a pap smear? No    Health Maintenance Due   Topic Date Due    COVID-19 Vaccine (1) Never done    Pneumococcal 65+ years (1 - PCV) Never done    Eye Exam Retinal or Dilated  Never done    Foot Exam Q1  08/16/2017    Shingles Vaccine (2 of 2) 02/08/2020    Diabetic Alb to Cr ratio (uACR) test  07/16/2020   Verified patient with two type of identifiers.

## 2023-04-18 NOTE — TELEPHONE ENCOUNTER
Spoke with Luigi Harrison from Pharmacy. Advise aware of side affect and made patient aware.  No further action needed at this time

## 2023-04-18 NOTE — TELEPHONE ENCOUNTER
[facility] is calling they are stating that when patient saw Elder Rich on 3/23 that the diagnosis code for intestinal angiodema triggered a contranidication for the medication Lisinopril HCTZ is letting the pharmacist know that patient should not be on this medication with this diagnosis. Pharmacist has reached out to PCP's office but was referred to Elder Rich because of the diagnosis.  Please advise Grabiel Chapin [phone]

## 2023-04-18 NOTE — TELEPHONE ENCOUNTER
Diagnosis was angioedema, not intestinal angioedema. It occurred while she was getting infliximab, and has resolved off of it. While lisinopril is a well-known cause of angioedema, it is not the exclusive cause of angioedema, and she has done well on lisinopril previously and since coming off of infliximab. We had discussed this at our appointment, and she is aware to stop the medication and to seek urgent medical attention with any recurrence.

## 2023-04-18 NOTE — PROGRESS NOTES
HISTORY OF PRESENT ILLNESS  Deandre De La Torre is a 74 y.o. female,  present for Bp check , on no the bp meds, having a low salt diet, has an active life style, patient does not obtain the bp at home today the pt denies Chest Pain, has no legs swelling no lightheadedness, in addition patient also denies any racing heart palpitation at this visit,  the pat has not been feeling anxious, and  Has not been feeling stressed out, otherwise feeling better since the last visit         Current Outpatient Medications   Medication Sig Dispense Refill    lisinopril-hydroCHLOROthiazide (PRINZIDE, ZESTORETIC) 20-25 mg per tablet TAKE 1 TABLET BY MOUTH DAILY. PATIENT REQUIRES AN OFFICE VISIT FOR ADDITIONAL REFILLS. 90 Tablet 3    metoprolol tartrate (LOPRESSOR) 25 mg tablet TAKE 1 TABLET BY MOUTH TWICE A DAY 60 Tablet 0    methotrexate (RHEUMATREX) 2.5 mg tablet TAKE 4 TABLETS BY MOUTH EVERY SATURDAY. 48 Tablet 0    cloNIDine HCL (CATAPRES) 0.2 mg tablet TAKE 1 TABLET BY MOUTH EVERY DAY AT NIGHT 90 Tablet 1    simvastatin (ZOCOR) 10 mg tablet TAKE 1 TABLET BY MOUTH EVERY DAY AT NIGHT 90 Tablet 1    predniSONE (DELTASONE) 10 mg tablet       cyanocobalamin (Vitamin B-12) 1,000 mcg tablet Take 1 Tablet by mouth daily. 90 Tablet 3    etodolac (LODINE) 400 mg tablet Take 1 Tablet by mouth two (2) times daily as needed for Pain. 40 Tablet 1    ergocalciferol (ERGOCALCIFEROL) 1,250 mcg (50,000 unit) capsule Take 1 Capsule by mouth every seven (7) days. Indications: vitamin D deficiency (high dose therapy) 12 Capsule 4    folic acid (FOLVITE) 1 mg tablet Take 1 Tab by mouth daily. 90 Tab 5    metFORMIN (GLUCOPHAGE) 500 mg tablet Take 1 Tab by mouth daily (with breakfast). 90 Tab 3    calcium carbonate-vitamin D3 (CALTRATE 600 PLUS D) 600 mg (1,500 mg)-800 unit chew Take 1 Tab by mouth daily. 90 Tab 3    aspirin 81 mg chewable tablet Take 1 Tab by mouth daily.  30 Tab 11    cetirizine (ZYRTEC) 10 mg tablet Take 1 Tablet by mouth two (2) times a day. (Patient not taking: Reported on 3/23/2023) 30 Tablet 0    folic acid (FOLVITE) 1 mg tablet Take 1 Tablet by mouth daily.  (Patient not taking: Reported on 3/23/2023) 90 Tablet 5     Allergies   Allergen Reactions    Codeine Nausea and Vomiting    Infliximab Angioedema     2023 infusion -> lip swelling    Lisinopril Swelling     Past Medical History:   Diagnosis Date    Arthritis     hands/all over    Diabetes mellitus ([facility] 6/15/2015    Dyspepsia and other specified disorders of function of stomach     Foot pain, bilateral 6/15/2015    Hemiparesis affecting left side as late effect of cerebrovascular accident ([address] 2014    Hypercholesteremia 2014    Hypertension     Knee pain, bilateral 6/15/2015    Lipoma of back 2015    Nausea & vomiting     Prediabetes 2015    S/P stroke due to cerebrovascular disease 2014    Screening for breast cancer 9/15/2015    Type II diabetes mellitus with nephropathy ([facility] 2019     Past Surgical History:   Procedure Laterality Date    COLONOSCOPY N/A 10/17/2019    COLONOSCOPY performed by Sd Paige MD at [facility] ENDOSCOPY    HX OTHER SURGICAL      lipoma removed from back     [address]  2004    hernia repair     Family History   Problem Relation Age of Onset    Hypertension Mother     Diabetes Mother     Arthritis-rheumatoid Father     Hypertension Brother     Stroke Brother      Social History     Tobacco Use    Smoking status: Former     Packs/day: 0.25     Years: 35.00     Pack years: 8.75     Types: Cigarettes     Quit date: 2009     Years since quittin.8    Smokeless tobacco: Never   Substance Use Topics    Alcohol use: No     Comment: ETOH abuse in past; quit       Lab Results   Component Value Date/Time    Hemoglobin A1c 8.0 (H) 10/20/2022 03:50 PM    Hemoglobin A1c 7.2 (H) 2019 11:47 AM    Hemoglobin A1c 6.6 (H) 09/15/2015 10:51 AM    Glucose 203 (H) 02/20/2023 10:19 AM    Glucose (POC) 112 (H) 06/04/2015 08:08 AM    Microalb/Creat ratio (ug/mg creat.) 96.3 (H) 07/16/2019 03:41 PM    LDL, calculated 116 (H) 10/20/2022 03:50 PM    LDL, calculated 96 02/21/2020 11:17 AM    Creatinine 0.93 02/20/2023 10:19 AM      Lab Results   Component Value Date/Time    Cholesterol, total 195 10/20/2022 03:50 PM    HDL Cholesterol 67 10/20/2022 03:50 PM    LDL, calculated 116 (H) 10/20/2022 03:50 PM    LDL, calculated 96 02/21/2020 11:17 AM    Triglyceride 66 10/20/2022 03:50 PM        Review of Systems   Constitutional:  Negative for chills and fever. HENT:  Negative for congestion and nosebleeds. Eyes:  Negative for blurred vision and pain. Respiratory:  Negative for cough, shortness of breath and wheezing. Cardiovascular:  Negative for chest pain and leg swelling. Gastrointestinal:  Negative for constipation, diarrhea, nausea and vomiting. Genitourinary:  Negative for dysuria and frequency. Musculoskeletal:  Negative for joint pain and myalgias. Skin:  Negative for itching and rash. Neurological:  Negative for dizziness, loss of consciousness and headaches. Psychiatric/Behavioral:  Negative for depression. The patient is not nervous/anxious and does not have insomnia. Physical Exam  Vitals and nursing note reviewed. Constitutional:       Appearance: She is well-developed. HENT:      Head: Normocephalic and atraumatic. Eyes:      Conjunctiva/sclera: Conjunctivae normal.      Pupils: Pupils are equal, round, and reactive to light. Neck:      Thyroid: No thyromegaly. Vascular: No JVD. Cardiovascular:      Rate and Rhythm: Normal rate and regular rhythm. Heart sounds: Normal heart sounds. No murmur heard. No friction rub. No gallop. Pulmonary:      Effort: Pulmonary effort is normal. No respiratory distress. Breath sounds: Normal breath sounds. No stridor. No wheezing or rales.    Abdominal:      General: Bowel sounds are normal. There is no distension. Palpations: Abdomen is soft. There is no mass. Tenderness: There is no abdominal tenderness. Musculoskeletal:         General: No tenderness. Normal range of motion. Lymphadenopathy:      Cervical: No cervical adenopathy. Skin:     Findings: No erythema or rash. Neurological:      Mental Status: She is alert and oriented to person, place, and time. Cranial Nerves: No cranial nerve deficit. Deep Tendon Reflexes: Reflexes are normal and symmetric. Psychiatric:         Behavior: Behavior normal.       ASSESSMENT and PLAN    ICD-10-CM ICD-9-CM    1. Immunosuppression ([facility])  D84.9 279.9 CBC W/O DIFF      LIPID PANEL      HEMOGLOBIN A1C WITH EAG      MAGNESIUM      MICROALBUMIN, UR, RAND W/ MICROALB/CREAT RATIO      TSH 3RD GENERATION      FERRITIN      CBC W/O DIFF      LIPID PANEL      HEMOGLOBIN A1C WITH EAG      MAGNESIUM      MICROALBUMIN, UR, RAND W/ MICROALB/CREAT RATIO      TSH 3RD GENERATION      FERRITIN      2. Ulcerative pancolitis with other complication ([facility]  S63.868 556.6 CBC W/O DIFF      LIPID PANEL      HEMOGLOBIN A1C WITH EAG      MAGNESIUM      MICROALBUMIN, UR, RAND W/ MICROALB/CREAT RATIO      TSH 3RD GENERATION      FERRITIN      CBC W/O DIFF      LIPID PANEL      HEMOGLOBIN A1C WITH EAG      MAGNESIUM      MICROALBUMIN, UR, RAND W/ MICROALB/CREAT RATIO      TSH 3RD GENERATION      FERRITIN      3. Hemiparesis affecting left side as late effect of cerebrovascular accident ([facility]  I69.354 438.20 CBC W/O DIFF      LIPID PANEL      HEMOGLOBIN A1C WITH EAG      MAGNESIUM      MICROALBUMIN, UR, RAND W/ MICROALB/CREAT RATIO      TSH 3RD GENERATION      FERRITIN      CBC W/O DIFF      LIPID PANEL      HEMOGLOBIN A1C WITH EAG      MAGNESIUM      MICROALBUMIN, UR, RAND W/ MICROALB/CREAT RATIO      TSH 3RD GENERATION      FERRITIN      4.  Controlled diabetes mellitus type 2 with complications, unspecified whether long term insulin use ([facility]  E11.8 250.90 CBC W/O DIFF      LIPID PANEL      HEMOGLOBIN A1C WITH EAG      MAGNESIUM      MICROALBUMIN, UR, RAND W/ MICROALB/CREAT RATIO      TSH 3RD GENERATION      FERRITIN      CBC W/O DIFF      LIPID PANEL      HEMOGLOBIN A1C WITH EAG      MAGNESIUM      MICROALBUMIN, UR, RAND W/ MICROALB/CREAT RATIO      TSH 3RD GENERATION      FERRITIN      5.  Abnormal finding of blood chemistry, unspecified  R79.9 790.6 MAGNESIUM      MAGNESIUM          lab results and schedule of future lab studies reviewed with patient  reviewed diet, exercise and weight control

## 2023-04-20 ENCOUNTER — APPOINTMENT (OUTPATIENT)
Dept: INFUSION THERAPY | Age: 68
End: 2023-04-20

## 2023-04-20 ENCOUNTER — HOSPITAL ENCOUNTER (OUTPATIENT)
Dept: INFUSION THERAPY | Age: 68
End: 2023-04-20

## 2023-04-21 DIAGNOSIS — M05.9 SEROPOSITIVE RHEUMATOID ARTHRITIS (HCC): Primary | ICD-10-CM

## 2023-04-21 DIAGNOSIS — Z79.899 ONGOING USE OF POSSIBLY TOXIC MEDICATION: ICD-10-CM

## 2023-04-24 DIAGNOSIS — M05.9 SEROPOSITIVE RHEUMATOID ARTHRITIS (HCC): Primary | ICD-10-CM

## 2023-04-24 DIAGNOSIS — Z79.899 ONGOING USE OF POSSIBLY TOXIC MEDICATION: ICD-10-CM

## 2023-04-26 ENCOUNTER — HOSPITAL ENCOUNTER (OUTPATIENT)
Dept: INFUSION THERAPY | Age: 68
Discharge: HOME | End: 2023-04-26
Payer: MEDICARE

## 2023-04-26 VITALS
RESPIRATION RATE: 16 BRPM | BODY MASS INDEX: 23.23 KG/M2 | TEMPERATURE: 97.9 F | HEIGHT: 60 IN | HEART RATE: 72 BPM | WEIGHT: 118.3 LBS | SYSTOLIC BLOOD PRESSURE: 129 MMHG | DIASTOLIC BLOOD PRESSURE: 80 MMHG | OXYGEN SATURATION: 100 %

## 2023-04-26 DIAGNOSIS — M05.9 SEROPOSITIVE RHEUMATOID ARTHRITIS (HCC): Primary | ICD-10-CM

## 2023-04-26 LAB
ALBUMIN SERPL-MCNC: 3.1 G/DL (ref 3.5–5)
ALBUMIN/GLOB SERPL: 0.8 (ref 1.1–2.2)
ALP SERPL-CCNC: 98 U/L (ref 45–117)
ALT SERPL-CCNC: 37 U/L (ref 12–78)
ANION GAP SERPL CALC-SCNC: 6 MMOL/L (ref 5–15)
AST SERPL-CCNC: 39 U/L (ref 15–37)
BASOPHILS # BLD: 0 K/UL (ref 0–0.1)
BASOPHILS NFR BLD: 0 % (ref 0–1)
BILIRUB SERPL-MCNC: 0.5 MG/DL (ref 0.2–1)
BUN SERPL-MCNC: 14 MG/DL (ref 6–20)
BUN/CREAT SERPL: 20 (ref 12–20)
CALCIUM SERPL-MCNC: 8.4 MG/DL (ref 8.5–10.1)
CHLORIDE SERPL-SCNC: 105 MMOL/L (ref 97–108)
CO2 SERPL-SCNC: 28 MMOL/L (ref 21–32)
CREAT SERPL-MCNC: 0.7 MG/DL (ref 0.55–1.02)
CRP SERPL-MCNC: <0.29 MG/DL (ref 0–0.6)
DIFFERENTIAL METHOD BLD: ABNORMAL
EOSINOPHIL # BLD: 0.2 K/UL (ref 0–0.4)
EOSINOPHIL NFR BLD: 3 % (ref 0–7)
ERYTHROCYTE [DISTWIDTH] IN BLOOD BY AUTOMATED COUNT: 14.6 % (ref 11.5–14.5)
ERYTHROCYTE [SEDIMENTATION RATE] IN BLOOD: 23 MM/HR (ref 0–30)
GLOBULIN SER CALC-MCNC: 3.7 G/DL (ref 2–4)
GLUCOSE SERPL-MCNC: 159 MG/DL (ref 65–100)
HBV SURFACE AG SER QL: <0.1 INDEX
HBV SURFACE AG SER QL: NEGATIVE
HCT VFR BLD AUTO: 33.6 % (ref 35–47)
HGB BLD-MCNC: 11 G/DL (ref 11.5–16)
IGA SERPL-MCNC: 290 MG/DL (ref 70–400)
IGG SERPL-MCNC: 1020 MG/DL (ref 700–1600)
IGM SERPL-MCNC: 69 MG/DL (ref 40–230)
IMM GRANULOCYTES # BLD AUTO: 0 K/UL (ref 0–0.04)
IMM GRANULOCYTES NFR BLD AUTO: 0 % (ref 0–0.5)
LYMPHOCYTES # BLD: 2 K/UL (ref 0.8–3.5)
LYMPHOCYTES NFR BLD: 36 % (ref 12–49)
MCH RBC QN AUTO: 31.1 PG (ref 26–34)
MCHC RBC AUTO-ENTMCNC: 32.7 G/DL (ref 30–36.5)
MCV RBC AUTO: 94.9 FL (ref 80–99)
MONOCYTES # BLD: 0.5 K/UL (ref 0–1)
MONOCYTES NFR BLD: 9 % (ref 5–13)
NEUTS SEG # BLD: 2.8 K/UL (ref 1.8–8)
NEUTS SEG NFR BLD: 52 % (ref 32–75)
NRBC # BLD: 0 K/UL (ref 0–0.01)
NRBC BLD-RTO: 0 PER 100 WBC
PLATELET # BLD AUTO: 272 K/UL (ref 150–400)
PMV BLD AUTO: 10.1 FL (ref 8.9–12.9)
POTASSIUM SERPL-SCNC: 4.8 MMOL/L (ref 3.5–5.1)
PROT SERPL-MCNC: 6.8 G/DL (ref 6.4–8.2)
RBC # BLD AUTO: 3.54 M/UL (ref 3.8–5.2)
SODIUM SERPL-SCNC: 139 MMOL/L (ref 136–145)
WBC # BLD AUTO: 5.4 K/UL (ref 3.6–11)

## 2023-04-26 PROCEDURE — 36415 COLL VENOUS BLD VENIPUNCTURE: CPT

## 2023-04-26 PROCEDURE — 74011000250 HC RX REV CODE- 250: Performed by: INTERNAL MEDICINE

## 2023-04-26 PROCEDURE — 86704 HEP B CORE ANTIBODY TOTAL: CPT

## 2023-04-26 PROCEDURE — 80053 COMPREHEN METABOLIC PANEL: CPT

## 2023-04-26 PROCEDURE — 86140 C-REACTIVE PROTEIN: CPT

## 2023-04-26 PROCEDURE — 87340 HEPATITIS B SURFACE AG IA: CPT

## 2023-04-26 PROCEDURE — 96365 THER/PROPH/DIAG IV INF INIT: CPT

## 2023-04-26 PROCEDURE — 85652 RBC SED RATE AUTOMATED: CPT

## 2023-04-26 PROCEDURE — 86707 HEPATITIS BE ANTIBODY: CPT

## 2023-04-26 PROCEDURE — 85025 COMPLETE CBC W/AUTO DIFF WBC: CPT

## 2023-04-26 PROCEDURE — 74011000258 HC RX REV CODE- 258: Performed by: INTERNAL MEDICINE

## 2023-04-26 PROCEDURE — 74011250636 HC RX REV CODE- 250/636: Performed by: INTERNAL MEDICINE

## 2023-04-26 PROCEDURE — 82784 ASSAY IGA/IGD/IGG/IGM EACH: CPT

## 2023-04-26 RX ORDER — SODIUM CHLORIDE 9 MG/ML
5-250 INJECTION, SOLUTION INTRAVENOUS AS NEEDED
OUTPATIENT
Start: 2023-05-24

## 2023-04-26 RX ORDER — DIPHENHYDRAMINE HYDROCHLORIDE 50 MG/ML
25 INJECTION, SOLUTION INTRAMUSCULAR; INTRAVENOUS AS NEEDED
Start: 2023-05-24

## 2023-04-26 RX ORDER — SODIUM CHLORIDE 0.9 % (FLUSH) 0.9 %
5-40 SYRINGE (ML) INJECTION AS NEEDED
OUTPATIENT
Start: 2023-05-24

## 2023-04-26 RX ORDER — SODIUM CHLORIDE 9 MG/ML
5-250 INJECTION, SOLUTION INTRAVENOUS AS NEEDED
Status: DISPENSED | OUTPATIENT
Start: 2023-04-26 | End: 2023-04-26

## 2023-04-26 RX ORDER — HYDROCORTISONE SODIUM SUCCINATE 100 MG/2ML
100 INJECTION, POWDER, FOR SOLUTION INTRAMUSCULAR; INTRAVENOUS AS NEEDED
OUTPATIENT
Start: 2023-05-24

## 2023-04-26 RX ORDER — DIPHENHYDRAMINE HYDROCHLORIDE 50 MG/ML
50 INJECTION, SOLUTION INTRAMUSCULAR; INTRAVENOUS AS NEEDED
Start: 2023-05-24

## 2023-04-26 RX ORDER — ACETAMINOPHEN 325 MG/1
650 TABLET ORAL AS NEEDED
Start: 2023-05-24

## 2023-04-26 RX ORDER — SODIUM CHLORIDE 9 MG/ML
5-40 INJECTION INTRAVENOUS AS NEEDED
OUTPATIENT
Start: 2023-05-24

## 2023-04-26 RX ORDER — HEPARIN 100 UNIT/ML
500 SYRINGE INTRAVENOUS AS NEEDED
Start: 2023-05-24

## 2023-04-26 RX ORDER — ONDANSETRON 2 MG/ML
8 INJECTION INTRAMUSCULAR; INTRAVENOUS AS NEEDED
OUTPATIENT
Start: 2023-05-24

## 2023-04-26 RX ORDER — EPINEPHRINE 1 MG/ML
0.3 INJECTION, SOLUTION, CONCENTRATE INTRAVENOUS AS NEEDED
OUTPATIENT
Start: 2023-05-24

## 2023-04-26 RX ORDER — ALBUTEROL SULFATE 0.83 MG/ML
2.5 SOLUTION RESPIRATORY (INHALATION) AS NEEDED
Start: 2023-05-24

## 2023-04-26 RX ORDER — SODIUM CHLORIDE 0.9 % (FLUSH) 0.9 %
5-10 SYRINGE (ML) INJECTION AS NEEDED
Status: DISCONTINUED | OUTPATIENT
Start: 2023-04-26 | End: 2023-04-27 | Stop reason: HOSPADM

## 2023-04-26 RX ADMIN — Medication 10 ML: at 08:15

## 2023-04-26 RX ADMIN — Medication 10 ML: at 09:43

## 2023-04-26 RX ADMIN — GOLIMUMAB 103.8 MG: 50 SOLUTION INTRAVENOUS at 08:59

## 2023-04-26 RX ADMIN — SODIUM CHLORIDE 25 ML/HR: 9 INJECTION, SOLUTION INTRAVENOUS at 08:59

## 2023-04-26 NOTE — DISCHARGE INSTRUCTIONS
Golimumab (By injection)   Golimumab ([id number])  Treats rheumatoid arthritis, psoriatic arthritis, ankylosing spondylitis, and ulcerative colitis. Brand Name(s): JanieamarjitAileen   There may be other brand names for this medicine. When This Medicine Should Not Be Used: This medicine is not right for everyone. You should not receive it if you had an allergic reaction to golimumab. How to Use This Medicine:   Injectable  Your doctor will prescribe your dose and schedule. This medicine is given through a needle placed in a vein or as a shot under your skin. Waleska Person must be injected slowly, so the IV will need to stay in place for 30 minutes. A nurse or other health provider will give you this medicine. You may be taught how to give your medicine at home. Make sure you understand all instructions before giving yourself an injection. Do not use more medicine or use it more often than your doctor tells you to. Use a new needle and syringe each time you inject your medicine. You will be shown the body areas where this shot can be given. Use a different body area each time you give yourself a shot. Keep track of where you give each shot to make sure you rotate body areas. Do not inject into skin areas that are red, bruised, tender, or hard, or have scars or stretch marks. Check the liquid in the syringe or autoinjector. It should be clear and colorless or slightly yellow. Do not use Simponi® if it is cloudy, discolored, or has particles in it. Do not shake the medicine. This medicine should come with a Medication Guide. Ask your pharmacist for a copy if you do not have one. Missed dose: Take a dose as soon as you remember. If it is almost time for your next dose, wait until then and take a regular dose. Do not take extra medicine to make up for a missed dose. If you store this medicine at home, keep it in the refrigerator. Do not freeze. Protect the medicine from direct light.  Keep the medicine in the original package until you are ready to use it. Drugs and Foods to Avoid:   Ask your doctor or pharmacist before using any other medicine, including over-the-counter medicines, vitamins, and herbal products. Some medicines can affect how golimumab works. Tell your doctor if you are using any of the following:  Abatacept, adalimumab, anakinra, certolizumab, cyclosporine, etanercept, infliximab, rituximab, theophylline, tocilizumab  Blood thinner (including warfarin)  Medicine that weakens the immune system (including a steroid or cancer medicine)  This medicine may interfere with vaccines. Ask your doctor before you get a flu shot or any other vaccines. Warnings While Using This Medicine:   Tell your doctor if you are pregnant or breastfeeding, or if you have liver disease, cancer, heart failure, diabetes, COPD, psoriasis, problems with your immune system, Wegener granulomatosis, optic neuritis, multiple sclerosis, or a history of Guillain-Barré syndrome. Tell your doctor if you have any type of infection (including hepatitis B or tuberculosis) or an infection that keeps coming back. Tell your doctor if you are allergic to latex. This medicine may cause the following problems:  Increased risk for infection  Increased risk of certain cancers (including lymphoma, leukemia, colon cancer, skin cancer)  Heart problems, including heart failure  Liver problems  Lupus-like syndrome  You will need to have a skin test for tuberculosis (TB) before you start this medicine. Tell your doctor if you or anyone in your home has ever had a positive TB skin test or been exposed to TB. This medicine may make you bleed, bruise, or get infections more easily. Take precautions to prevent illness and injury. Wash your hands often. This medicine may make your skin more sensitive to sunlight. Wear sunscreen. Do not use sunlamps or tanning beds.   Your doctor will do lab tests at regular visits to check on the effects of this medicine. Keep all appointments. Throw away used needles in a hard, closed container that the needles cannot poke through. Keep this container away from children and pets. Keep all medicine out of the reach of children. Never share your medicine with anyone. Possible Side Effects While Using This Medicine:   Call your doctor right away if you notice any of these side effects: Allergic reaction: Itching or hives, swelling in your face or hands, swelling or tingling in your mouth or throat, chest tightness, trouble breathing  Change in how much or how often you urinate, painful urination  Changes in vision  Dark urine or pale stools, nausea, vomiting, loss of appetite, stomach pain, yellow skin or eyes  Fever, chills, cough, runny or stuffy nose, sore throat, and body aches  Numbness, tingling, or burning pain in your hands, arms, legs, or feet  Rapid weight gain, swelling in your hands, ankles, or feet  Swollen glands in the neck, underarms, or groin  Trouble breathing, chest pain, uneven heartbeat  Unusual bleeding, bruising, or weakness  Warm, red, swollen, or painful skin, blisters, skin sores  If you notice these less serious side effects, talk with your doctor:   Redness, itching, pain, or swelling where the needle was placed or the shot was given  If you notice other side effects that you think are caused by this medicine, tell your doctor. Call your doctor for medical advice about side effects. You may report side effects to FDA at 5-754-AHJ-0549  © 2017 [facility] Information is for End User's use only and may not be sold, redistributed or otherwise used for commercial purposes. The above information is an  only. It is not intended as medical advice for individual conditions or treatments. Talk to your doctor, nurse or pharmacist before following any medical regimen to see if it is safe and effective for you.

## 2023-04-26 NOTE — PROGRESS NOTES
[facility] Progress Note  Date: April 26, 2023       Treatment: Simponi    Ms. Bettina Woods arrived in no acute distress at 58. Patient COVID Screening completed:  Do you have any symptoms of COVID-19? SOB, coughing, fever, or generally not feeling well? NO  Have you been exposed to COVID-19 recently? NO  Have you had any recent contact with family/friend that has a pending COVID test? NO    Assessment was unremarkable with the exception of joint swelling in hands. No other concerns voiced. 24G PIV established in R A/C with positive blood return noted. Problem: Knowledge Deficit  Goal: *Verbalizes understanding of procedures and medications  Outcome: Progressing Towards Goal     Problem: Patient Education:  Go to Education Activity  Goal: Patient/Family Education  Outcome: Progressing Towards Goal    Ms. Lee's vitals for today's visit. Patient Vitals for the past 12 hrs:   Temp Pulse Resp BP SpO2   04/26/23 0928 -- 72 -- 129/80 --   04/26/23 0811 97.9 °F (36.6 °C) 70 16 (!) 147/90 100 %     Lab results:  Recent Results (from the past 12 hour(s))   CBC WITH AUTOMATED DIFF    Collection Time: 04/26/23  8:23 AM   Result Value Ref Range    WBC 5.4 3.6 - 11.0 K/uL    RBC 3.54 (L) 3.80 - 5.20 M/uL    HGB 11.0 (L) 11.5 - 16.0 g/dL    HCT 33.6 (L) 35.0 - 47.0 %    MCV 94.9 80.0 - 99.0 FL    MCH 31.1 26.0 - 34.0 PG    MCHC 32.7 30.0 - 36.5 g/dL    RDW 14.6 (H) 11.5 - 14.5 %    PLATELET 000 001 - 195 K/uL    MPV 10.1 8.9 - 12.9 FL    NRBC 0.0 0  WBC    ABSOLUTE NRBC 0.00 0.00 - 0.01 K/uL    NEUTROPHILS 52 32 - 75 %    LYMPHOCYTES 36 12 - 49 %    MONOCYTES 9 5 - 13 %    EOSINOPHILS 3 0 - 7 %    BASOPHILS 0 0 - 1 %    IMMATURE GRANULOCYTES 0 0.0 - 0.5 %    ABS. NEUTROPHILS 2.8 1.8 - 8.0 K/UL    ABS. LYMPHOCYTES 2.0 0.8 - 3.5 K/UL    ABS. MONOCYTES 0.5 0.0 - 1.0 K/UL    ABS. EOSINOPHILS 0.2 0.0 - 0.4 K/UL    ABS. BASOPHILS 0.0 0.0 - 0.1 K/UL    ABS. IMM.  GRANS. 0.0 0.00 - 0.04 K/UL    DF AUTOMATED     METABOLIC PANEL, COMPREHENSIVE    Collection Time: 04/26/23  8:23 AM   Result Value Ref Range    Sodium 139 136 - 145 mmol/L    Potassium 4.8 3.5 - 5.1 mmol/L    Chloride 105 97 - 108 mmol/L    CO2 28 21 - 32 mmol/L    Anion gap 6 5 - 15 mmol/L    Glucose 159 (H) 65 - 100 mg/dL    BUN 14 6 - 20 MG/DL    Creatinine 0.70 0.55 - 1.02 MG/DL    BUN/Creatinine ratio 20 12 - 20      eGFR >60 >60 ml/min/1.73m2    Calcium 8.4 (L) 8.5 - 10.1 MG/DL    Bilirubin, total 0.5 0.2 - 1.0 MG/DL    ALT (SGPT) 37 12 - 78 U/L    AST (SGOT) 39 (H) 15 - 37 U/L    Alk. phosphatase 98 45 - 117 U/L    Protein, total 6.8 6.4 - 8.2 g/dL    Albumin 3.1 (L) 3.5 - 5.0 g/dL    Globulin 3.7 2.0 - 4.0 g/dL    A-G Ratio 0.8 (L) 1.1 - 2.2     SED RATE (ESR)    Collection Time: 04/26/23  8:23 AM   Result Value Ref Range    Sed rate, automated 23 0 - 30 mm/hr     Medications given:  Medications Administered       0.9% sodium chloride infusion       Admin Date  04/26/2023 Action  New Bag Dose  25 mL/hr Rate  25 mL/hr Route  IntraVENous Administered By  Diamond Lopez RN              golimumab (SIMPONI ARIA) 103.8 mg in 0.9% sodium chloride, overfill volume 118.3 mL infusion       Admin Date  04/26/2023 Action  New Bag Dose  103.8 mg Rate  236.6 mL/hr Route  IntraVENous Administered By  Diamond Lopez RN              sodium chloride (NS) flush 5-10 mL       Admin Date  04/26/2023 Action  Given Dose  10 mL Route  IntraVENous Administered By  Diamond Lopez RN               Ms. Bettina Woods tolerated the treatment without complaints. PIV flushed and removed, 2x2 and coban placed. Ms. Bettina Woods was discharged from [place] [address] [facility]      Future Appointments   Date Time Provider Kaur Herzog   5/24/2023  8:00  [facility] INFUSION NURSE 1 [address] [facility]   5/25/2023  2:40 PM Charli Bazan MD [facility] BS AMB   6/5/2023  3:00 PM Denise Gregg MD [facility] BS AMB   7/19/2023  8:00 AM [facility] INFUSION NURSE 1 RCHOPIC REMY COM 9/13/2023  8:00 AM [facility] INFUSION NURSE 1 CHIO STRAUSS   11/8/2023  8:00 AM [facility] INFUSION NURSE 1 [address] [place] [address]     Ankur Haro RN  April 26, 2023  8:38 AM

## 2023-04-27 LAB — HBV CORE AB SERPL QL IA: NEGATIVE

## 2023-04-28 DIAGNOSIS — Z79.899 ONGOING USE OF POSSIBLY TOXIC MEDICATION: ICD-10-CM

## 2023-04-28 DIAGNOSIS — M05.9 SEROPOSITIVE RHEUMATOID ARTHRITIS (HCC): Primary | ICD-10-CM

## 2023-04-28 LAB — HBV E AB SERPL QL IA: NEGATIVE

## 2023-04-30 LAB
HBV CORE AB SERPL QL IA: NEGATIVE
HBV CORE IGM SERPL QL IA: NEGATIVE

## 2023-05-03 DIAGNOSIS — I10 HTN, GOAL BELOW 130/80: ICD-10-CM

## 2023-05-04 RX ORDER — METOPROLOL TARTRATE 25 MG/1
TABLET, FILM COATED ORAL
Qty: 60 TABLET | Refills: 0 | Status: SHIPPED | OUTPATIENT
Start: 2023-05-04

## 2023-05-17 DIAGNOSIS — M05.9 RHEUMATOID ARTHRITIS WITH RHEUMATOID FACTOR, UNSPECIFIED (HCC): ICD-10-CM

## 2023-05-17 DIAGNOSIS — E55.9 VITAMIN D DEFICIENCY, UNSPECIFIED: ICD-10-CM

## 2023-05-18 RX ORDER — ERGOCALCIFEROL 1.25 MG/1
CAPSULE ORAL
Qty: 12 CAPSULE | Refills: 3 | Status: SHIPPED | OUTPATIENT
Start: 2023-05-18

## 2023-05-23 DIAGNOSIS — M05.9 SEROPOSITIVE RHEUMATOID ARTHRITIS (HCC): Primary | ICD-10-CM

## 2023-05-23 RX ORDER — ALBUTEROL SULFATE 90 UG/1
4 AEROSOL, METERED RESPIRATORY (INHALATION) PRN
Status: CANCELLED | OUTPATIENT
Start: 2023-05-24

## 2023-05-23 RX ORDER — SODIUM CHLORIDE 0.9 % (FLUSH) 0.9 %
5-40 SYRINGE (ML) INJECTION PRN
Status: CANCELLED | OUTPATIENT
Start: 2023-05-24

## 2023-05-23 RX ORDER — ONDANSETRON 2 MG/ML
8 INJECTION INTRAMUSCULAR; INTRAVENOUS
Status: CANCELLED | OUTPATIENT
Start: 2023-05-24

## 2023-05-23 RX ORDER — DIPHENHYDRAMINE HYDROCHLORIDE 50 MG/ML
50 INJECTION INTRAMUSCULAR; INTRAVENOUS
Status: CANCELLED | OUTPATIENT
Start: 2023-05-24

## 2023-05-23 RX ORDER — EPINEPHRINE 1 MG/ML
0.3 INJECTION, SOLUTION, CONCENTRATE INTRAVENOUS PRN
Status: CANCELLED | OUTPATIENT
Start: 2023-05-24

## 2023-05-23 RX ORDER — ACETAMINOPHEN 325 MG/1
650 TABLET ORAL
Status: CANCELLED | OUTPATIENT
Start: 2023-05-24

## 2023-05-23 RX ORDER — SODIUM CHLORIDE 9 MG/ML
5-250 INJECTION, SOLUTION INTRAVENOUS PRN
Status: CANCELLED | OUTPATIENT
Start: 2023-05-24

## 2023-05-23 RX ORDER — HEPARIN SODIUM (PORCINE) LOCK FLUSH IV SOLN 100 UNIT/ML 100 UNIT/ML
500 SOLUTION INTRAVENOUS PRN
Status: CANCELLED | OUTPATIENT
Start: 2023-05-24

## 2023-05-23 RX ORDER — SODIUM CHLORIDE 9 MG/ML
INJECTION, SOLUTION INTRAVENOUS CONTINUOUS
Status: CANCELLED | OUTPATIENT
Start: 2023-05-24

## 2023-05-24 ENCOUNTER — HOSPITAL ENCOUNTER (OUTPATIENT)
Facility: HOSPITAL | Age: 68
Setting detail: INFUSION SERIES
End: 2023-05-24
Payer: MEDICARE

## 2023-05-24 VITALS
DIASTOLIC BLOOD PRESSURE: 75 MMHG | HEART RATE: 69 BPM | RESPIRATION RATE: 16 BRPM | HEIGHT: 60 IN | BODY MASS INDEX: 22.81 KG/M2 | WEIGHT: 116.2 LBS | OXYGEN SATURATION: 100 % | TEMPERATURE: 97 F | SYSTOLIC BLOOD PRESSURE: 122 MMHG

## 2023-05-24 DIAGNOSIS — M05.9 SEROPOSITIVE RHEUMATOID ARTHRITIS (HCC): Primary | ICD-10-CM

## 2023-05-24 LAB
ALBUMIN SERPL-MCNC: 3.4 G/DL (ref 3.5–5)
ALBUMIN/GLOB SERPL: 0.9 (ref 1.1–2.2)
ALP SERPL-CCNC: 102 U/L (ref 45–117)
ALT SERPL-CCNC: 24 U/L (ref 12–78)
ANION GAP SERPL CALC-SCNC: 8 MMOL/L (ref 5–15)
AST SERPL-CCNC: 28 U/L (ref 15–37)
BILIRUB SERPL-MCNC: 0.5 MG/DL (ref 0.2–1)
BUN SERPL-MCNC: 23 MG/DL (ref 6–20)
BUN/CREAT SERPL: 30 (ref 12–20)
CALCIUM SERPL-MCNC: 8.5 MG/DL (ref 8.5–10.1)
CHLORIDE SERPL-SCNC: 104 MMOL/L (ref 97–108)
CO2 SERPL-SCNC: 27 MMOL/L (ref 21–32)
CREAT SERPL-MCNC: 0.76 MG/DL (ref 0.55–1.02)
CRP SERPL-MCNC: <0.29 MG/DL (ref 0–0.6)
ERYTHROCYTE [DISTWIDTH] IN BLOOD BY AUTOMATED COUNT: 14.4 % (ref 11.5–14.5)
ERYTHROCYTE [SEDIMENTATION RATE] IN BLOOD: 17 MM/HR (ref 0–30)
GLOBULIN SER CALC-MCNC: 3.6 G/DL (ref 2–4)
GLUCOSE SERPL-MCNC: 125 MG/DL (ref 65–100)
HCT VFR BLD AUTO: 36 % (ref 35–47)
HGB BLD-MCNC: 11.6 G/DL (ref 11.5–16)
MCH RBC QN AUTO: 30.8 PG (ref 26–34)
MCHC RBC AUTO-ENTMCNC: 32.2 G/DL (ref 30–36.5)
MCV RBC AUTO: 95.5 FL (ref 80–99)
NRBC # BLD: 0 K/UL (ref 0–0.01)
NRBC BLD-RTO: 0 PER 100 WBC
PLATELET # BLD AUTO: 297 K/UL (ref 150–400)
PMV BLD AUTO: 10.2 FL (ref 8.9–12.9)
POTASSIUM SERPL-SCNC: 4.8 MMOL/L (ref 3.5–5.1)
PROT SERPL-MCNC: 7 G/DL (ref 6.4–8.2)
RBC # BLD AUTO: 3.77 M/UL (ref 3.8–5.2)
SODIUM SERPL-SCNC: 139 MMOL/L (ref 136–145)
WBC # BLD AUTO: 6.6 K/UL (ref 3.6–11)

## 2023-05-24 PROCEDURE — 86140 C-REACTIVE PROTEIN: CPT

## 2023-05-24 PROCEDURE — 96365 THER/PROPH/DIAG IV INF INIT: CPT

## 2023-05-24 PROCEDURE — 36415 COLL VENOUS BLD VENIPUNCTURE: CPT

## 2023-05-24 PROCEDURE — 6360000002 HC RX W HCPCS: Performed by: INTERNAL MEDICINE

## 2023-05-24 PROCEDURE — 85652 RBC SED RATE AUTOMATED: CPT

## 2023-05-24 PROCEDURE — 85027 COMPLETE CBC AUTOMATED: CPT

## 2023-05-24 PROCEDURE — 2580000003 HC RX 258: Performed by: INTERNAL MEDICINE

## 2023-05-24 PROCEDURE — 80053 COMPREHEN METABOLIC PANEL: CPT

## 2023-05-24 RX ORDER — EPINEPHRINE 1 MG/ML
0.3 INJECTION, SOLUTION, CONCENTRATE INTRAVENOUS PRN
Status: CANCELLED | OUTPATIENT
Start: 2023-06-21

## 2023-05-24 RX ORDER — HEPARIN SODIUM (PORCINE) LOCK FLUSH IV SOLN 100 UNIT/ML 100 UNIT/ML
500 SOLUTION INTRAVENOUS PRN
Status: CANCELLED | OUTPATIENT
Start: 2023-06-21

## 2023-05-24 RX ORDER — SODIUM CHLORIDE 9 MG/ML
5-250 INJECTION, SOLUTION INTRAVENOUS PRN
Status: CANCELLED | OUTPATIENT
Start: 2023-06-21

## 2023-05-24 RX ORDER — ACETAMINOPHEN 325 MG/1
650 TABLET ORAL
Status: CANCELLED | OUTPATIENT
Start: 2023-06-21

## 2023-05-24 RX ORDER — SODIUM CHLORIDE 9 MG/ML
5-250 INJECTION, SOLUTION INTRAVENOUS PRN
Status: DISCONTINUED | OUTPATIENT
Start: 2023-05-24 | End: 2023-05-25 | Stop reason: HOSPADM

## 2023-05-24 RX ORDER — SODIUM CHLORIDE 9 MG/ML
INJECTION, SOLUTION INTRAVENOUS CONTINUOUS
Status: CANCELLED | OUTPATIENT
Start: 2023-06-21

## 2023-05-24 RX ORDER — SODIUM CHLORIDE 0.9 % (FLUSH) 0.9 %
5-40 SYRINGE (ML) INJECTION PRN
Status: CANCELLED | OUTPATIENT
Start: 2023-06-21

## 2023-05-24 RX ORDER — ALBUTEROL SULFATE 90 UG/1
4 AEROSOL, METERED RESPIRATORY (INHALATION) PRN
Status: CANCELLED | OUTPATIENT
Start: 2023-06-21

## 2023-05-24 RX ORDER — ONDANSETRON 2 MG/ML
8 INJECTION INTRAMUSCULAR; INTRAVENOUS
Status: CANCELLED | OUTPATIENT
Start: 2023-06-21

## 2023-05-24 RX ORDER — DIPHENHYDRAMINE HYDROCHLORIDE 50 MG/ML
50 INJECTION INTRAMUSCULAR; INTRAVENOUS
Status: CANCELLED | OUTPATIENT
Start: 2023-06-21

## 2023-05-24 RX ADMIN — GOLIMUMAB 107.5 MG: 50 SOLUTION INTRAVENOUS at 08:50

## 2023-05-24 RX ADMIN — SODIUM CHLORIDE 25 ML/HR: 9 INJECTION, SOLUTION INTRAVENOUS at 08:48

## 2023-05-24 NOTE — PROGRESS NOTES
Outpatient Infusion Center Short Visit Progress Note    0800 Ms. Mullins admitted to [facility] for [facility] ambulatory in stable condition. Assessment completed. No new concerns voiced. Patient has had no infections or ABT treatment at this time.     Vital Signs:  Patient Vitals for the past 24 hrs:   BP Temp Temp src Pulse Resp SpO2 Height Weight   05/24/23 0915 122/75 -- -- 69 16 -- -- --   05/24/23 0800 137/83 97 °F (36.1 °C) Temporal 74 16 100 % 1.524 m (5') 52.7 kg (116 lb 3.2 oz)       Peripheral IV 05/24/23 Proximal;Right Antecubital (Active)   Site Assessment Clean, dry & intact 05/24/23 0843   Line Status Brisk blood return 05/24/23 0843   Phlebitis Assessment No symptoms 05/24/23 0843   Infiltration Assessment 0 05/24/23 0843   Alcohol Cap Used Yes 05/24/23 0843   Dressing Status New dressing applied 05/24/23 0843   Dressing Type Transparent 05/24/23 0843   Dressing Intervention New 05/24/23 0843     Lab Results:  Recent Results (from the past 12 hour(s))   CBC    Collection Time: 05/24/23  8:19 AM   Result Value Ref Range    WBC 6.6 3.6 - 11.0 K/uL    RBC 3.77 (L) 3.80 - 5.20 M/uL    Hemoglobin 11.6 11.5 - 16.0 g/dL    Hematocrit 36.0 35.0 - 47.0 %    MCV 95.5 80.0 - 99.0 FL    MCH 30.8 26.0 - 34.0 PG    MCHC 32.2 30.0 - 36.5 g/dL    RDW 14.4 11.5 - 14.5 %    Platelets 109 598 - 899 K/uL    MPV 10.2 8.9 - 12.9 FL    Nucleated RBCs 0.0 0  WBC    nRBC 0.00 0.00 - 0.01 K/uL   Comprehensive Metabolic Panel    Collection Time: 05/24/23  8:19 AM   Result Value Ref Range    Sodium 139 136 - 145 mmol/L    Potassium 4.8 3.5 - 5.1 mmol/L    Chloride 104 97 - 108 mmol/L    CO2 27 21 - 32 mmol/L    Anion Gap 8 5 - 15 mmol/L    Glucose 125 (H) 65 - 100 mg/dL    BUN 23 (H) 6 - 20 MG/DL    Creatinine 0.76 0.55 - 1.02 MG/DL    Bun/Cre Ratio 30 (H) 12 - 20      Est, Glom Filt Rate >60 >60 ml/min/1.73m2    Calcium 8.5 8.5 - 10.1 MG/DL    Total Bilirubin 0.5 0.2 - 1.0 MG/DL    ALT 24 12 - 78 U/L    AST 28 15 - 37 U/L

## 2023-05-25 RX ORDER — CETIRIZINE HYDROCHLORIDE 10 MG/1
10 TABLET ORAL 2 TIMES DAILY
COMMUNITY
Start: 2023-02-20

## 2023-05-25 RX ORDER — ERGOCALCIFEROL 1.25 MG/1
50000 CAPSULE ORAL
COMMUNITY
Start: 2022-04-22

## 2023-05-25 RX ORDER — FOLIC ACID 1 MG/1
1 TABLET ORAL DAILY
COMMUNITY
Start: 2020-05-21

## 2023-05-25 RX ORDER — AMLODIPINE BESYLATE 10 MG/1
10 TABLET ORAL DAILY
COMMUNITY
Start: 2023-04-18

## 2023-05-25 RX ORDER — PREDNISONE 10 MG/1
TABLET ORAL
COMMUNITY
Start: 2022-05-18 | End: 2023-06-07

## 2023-05-25 RX ORDER — ASPIRIN 81 MG/1
81 TABLET, CHEWABLE ORAL DAILY
COMMUNITY
Start: 2015-09-15

## 2023-05-25 RX ORDER — CLONIDINE HYDROCHLORIDE 0.2 MG/1
1 TABLET ORAL NIGHTLY
COMMUNITY
Start: 2023-03-27

## 2023-05-25 RX ORDER — SIMVASTATIN 10 MG
1 TABLET ORAL NIGHTLY
COMMUNITY
Start: 2023-03-16

## 2023-06-07 ENCOUNTER — OFFICE VISIT (OUTPATIENT)
Age: 68
End: 2023-06-07
Payer: MEDICARE

## 2023-06-07 VITALS
TEMPERATURE: 97.7 F | WEIGHT: 117 LBS | SYSTOLIC BLOOD PRESSURE: 132 MMHG | HEART RATE: 82 BPM | RESPIRATION RATE: 16 BRPM | BODY MASS INDEX: 22.85 KG/M2 | DIASTOLIC BLOOD PRESSURE: 83 MMHG | OXYGEN SATURATION: 97 %

## 2023-06-07 DIAGNOSIS — Z79.899 OTHER LONG TERM (CURRENT) DRUG THERAPY: ICD-10-CM

## 2023-06-07 DIAGNOSIS — M05.9 RHEUMATOID ARTHRITIS WITH RHEUMATOID FACTOR, UNSPECIFIED (HCC): Primary | ICD-10-CM

## 2023-06-07 PROCEDURE — 1123F ACP DISCUSS/DSCN MKR DOCD: CPT | Performed by: INTERNAL MEDICINE

## 2023-06-07 PROCEDURE — 3078F DIAST BP <80 MM HG: CPT | Performed by: INTERNAL MEDICINE

## 2023-06-07 PROCEDURE — 99214 OFFICE O/P EST MOD 30 MIN: CPT | Performed by: INTERNAL MEDICINE

## 2023-06-07 PROCEDURE — 3074F SYST BP LT 130 MM HG: CPT | Performed by: INTERNAL MEDICINE

## 2023-06-07 ASSESSMENT — PATIENT HEALTH QUESTIONNAIRE - PHQ9
SUM OF ALL RESPONSES TO PHQ QUESTIONS 1-9: 0
SUM OF ALL RESPONSES TO PHQ9 QUESTIONS 1 & 2: 0
1. LITTLE INTEREST OR PLEASURE IN DOING THINGS: 0
SUM OF ALL RESPONSES TO PHQ QUESTIONS 1-9: 0
2. FEELING DOWN, DEPRESSED OR HOPELESS: 0
SUM OF ALL RESPONSES TO PHQ QUESTIONS 1-9: 0
SUM OF ALL RESPONSES TO PHQ QUESTIONS 1-9: 0

## 2023-06-13 ENCOUNTER — TELEPHONE (OUTPATIENT)
Age: 68
End: 2023-06-13

## 2023-06-13 NOTE — TELEPHONE ENCOUNTER
Identified patient 2 identifiers verified. Patient is calling to find out if [address] forms have been completed. Patient reports that her daughter bought in the forms to be completed but does not know when. Patient will speak with her daughter to find out when the  documents were bought in to inform this office.

## 2023-06-13 NOTE — TELEPHONE ENCOUNTER
Appt scheduled for [email or web address] Please follow up with one of the primary care clinics provided.     Return to the ED with any worsening symptoms--chest pain unrelieved by pepcid or maalox, shortness of breath, loss of consciousness, headache unrelieved by tylenol or motrin, vision changes, fevers, chills.

## 2023-06-20 ENCOUNTER — APPOINTMENT (OUTPATIENT)
Dept: INFUSION THERAPY | Age: 68
End: 2023-06-20

## 2023-07-10 ENCOUNTER — TELEPHONE (OUTPATIENT)
Age: 68
End: 2023-07-10

## 2023-07-10 NOTE — TELEPHONE ENCOUNTER
PT called and requested for recent office notes, labs and demographics to be faxed to the number she provided, confirmed that they were sent.      B:[phone]

## 2023-07-11 DIAGNOSIS — E53.8 DEFICIENCY OF OTHER SPECIFIED B GROUP VITAMINS: ICD-10-CM

## 2023-07-11 RX ORDER — HEPARIN 100 UNIT/ML
500 SYRINGE INTRAVENOUS PRN
OUTPATIENT
Start: 2023-07-11

## 2023-07-11 NOTE — TELEPHONE ENCOUNTER
Last visit  06/07/23  Lab Results   Component Value Date     05/24/2023    K 4.8 05/24/2023     05/24/2023    CO2 27 05/24/2023    BUN 23 (H) 05/24/2023    CREATININE 0.76 05/24/2023    GLUCOSE 125 (H) 05/24/2023    CALCIUM 8.5 05/24/2023    PROT 7.0 05/24/2023    LABALBU 3.4 (L) 05/24/2023    BILITOT 0.5 05/24/2023    ALKPHOS 102 05/24/2023    AST 28 05/24/2023    ALT 24 05/24/2023    LABGLOM >60 05/24/2023    GFRAA >60 08/02/2022    AGRATIO 0.8 (L) 04/26/2023    GLOB 3.6 05/24/2023     Lab Results   Component Value Date    WBC 6.6 05/24/2023    HGB 11.6 05/24/2023    HCT 36.0 05/24/2023    MCV 95.5 05/24/2023     05/24/2023

## 2023-07-12 RX ORDER — LANOLIN ALCOHOL/MO/W.PET/CERES
CREAM (GRAM) TOPICAL
Qty: 90 TABLET | Refills: 3 | Status: SHIPPED | OUTPATIENT
Start: 2023-07-12

## 2023-07-13 ENCOUNTER — OFFICE VISIT (OUTPATIENT)
Age: 68
End: 2023-07-13
Payer: MEDICARE

## 2023-07-13 VITALS
TEMPERATURE: 98.2 F | DIASTOLIC BLOOD PRESSURE: 82 MMHG | SYSTOLIC BLOOD PRESSURE: 133 MMHG | OXYGEN SATURATION: 98 % | HEIGHT: 60 IN | WEIGHT: 117 LBS | RESPIRATION RATE: 18 BRPM | BODY MASS INDEX: 22.97 KG/M2 | HEART RATE: 100 BPM

## 2023-07-13 DIAGNOSIS — K08.9 POOR DENTITION: Primary | ICD-10-CM

## 2023-07-13 DIAGNOSIS — E11.8 TYPE 2 DIABETES MELLITUS WITH UNSPECIFIED COMPLICATIONS (HCC): ICD-10-CM

## 2023-07-13 DIAGNOSIS — Z01.818 PREOPERATIVE EXAMINATION: ICD-10-CM

## 2023-07-13 LAB — HBA1C MFR BLD: 6.9 %

## 2023-07-13 PROCEDURE — 99213 OFFICE O/P EST LOW 20 MIN: CPT | Performed by: FAMILY MEDICINE

## 2023-07-13 PROCEDURE — 1123F ACP DISCUSS/DSCN MKR DOCD: CPT | Performed by: FAMILY MEDICINE

## 2023-07-13 PROCEDURE — PBSHW AMB POC HEMOGLOBIN A1C: Performed by: FAMILY MEDICINE

## 2023-07-13 PROCEDURE — 3075F SYST BP GE 130 - 139MM HG: CPT | Performed by: FAMILY MEDICINE

## 2023-07-13 PROCEDURE — 83036 HEMOGLOBIN GLYCOSYLATED A1C: CPT | Performed by: FAMILY MEDICINE

## 2023-07-13 PROCEDURE — 3079F DIAST BP 80-89 MM HG: CPT | Performed by: FAMILY MEDICINE

## 2023-07-13 SDOH — ECONOMIC STABILITY: FOOD INSECURITY: WITHIN THE PAST 12 MONTHS, THE FOOD YOU BOUGHT JUST DIDN'T LAST AND YOU DIDN'T HAVE MONEY TO GET MORE.: NEVER TRUE

## 2023-07-13 SDOH — ECONOMIC STABILITY: INCOME INSECURITY: HOW HARD IS IT FOR YOU TO PAY FOR THE VERY BASICS LIKE FOOD, HOUSING, MEDICAL CARE, AND HEATING?: NOT VERY HARD

## 2023-07-13 SDOH — ECONOMIC STABILITY: FOOD INSECURITY: WITHIN THE PAST 12 MONTHS, YOU WORRIED THAT YOUR FOOD WOULD RUN OUT BEFORE YOU GOT MONEY TO BUY MORE.: NEVER TRUE

## 2023-07-13 SDOH — ECONOMIC STABILITY: HOUSING INSECURITY
IN THE LAST 12 MONTHS, WAS THERE A TIME WHEN YOU DID NOT HAVE A STEADY PLACE TO SLEEP OR SLEPT IN A SHELTER (INCLUDING NOW)?: NO

## 2023-07-13 ASSESSMENT — PATIENT HEALTH QUESTIONNAIRE - PHQ9
SUM OF ALL RESPONSES TO PHQ9 QUESTIONS 1 & 2: 0
2. FEELING DOWN, DEPRESSED OR HOPELESS: 0
SUM OF ALL RESPONSES TO PHQ QUESTIONS 1-9: 0
1. LITTLE INTEREST OR PLEASURE IN DOING THINGS: 0
SUM OF ALL RESPONSES TO PHQ QUESTIONS 1-9: 0

## 2023-07-13 NOTE — PROGRESS NOTES
Subjective:      Db Bean is a 74 y.o. female who presents to the office today for a preoperative consultation at the request of surgeon    who presents for preoperative evaluation with current medical hx of poor dention for the last couple of years pt has been in a stable conditions w/out history of fall, and  stating that the current prescription are not helping,  seeking alternative surgical correction for current medical condition at this time,      pt's physical functioning is capable of doing >5 blocks of walking w/out getting shortness of breath, at this time there is no assistive devices used physically,   Patient with a good social support which include living at home, the pt is self cared, and the pt's other primary care giver is  at home,  no recent major trauma, does not have any history of blood clots, patient currently on daily baby dose aspirin as the only blood thinner for many years stating that the pt has had no hx of abnormal bleeding or easy bruisabiltity.   In addition pt is currently not taking any herbal supplements, nor any illicit drugs, current status of  for low pt is stable except for morbid obesity         Past Medical History:   Diagnosis Date    Arthritis     hands/all over    Diabetes mellitus ([address]) 6/15/2015    Dyspepsia and other specified disorders of function of stomach     Foot pain, bilateral 6/15/2015    Hemiparesis affecting left side as late effect of cerebrovascular accident ([address]) 4/18/2014    Hypercholesteremia 4/18/2014    Hypertension     Knee pain, bilateral 6/15/2015    Lipoma of back 5/14/2015    Nausea & vomiting     Prediabetes 5/14/2015    S/P stroke due to cerebrovascular disease 4/18/2014    Screening for breast cancer 9/15/2015    Type II diabetes mellitus with nephropathy ([address]) 7/23/2019     Patient Active Problem List    Diagnosis Date Noted    Immunosuppression ([address]) 04/18/2023    Ulcerative pancolitis with other complication ([address]) 16/46/9738

## 2023-07-19 ENCOUNTER — HOSPITAL ENCOUNTER (OUTPATIENT)
Facility: HOSPITAL | Age: 68
Setting detail: INFUSION SERIES
End: 2023-07-19

## 2023-08-01 DIAGNOSIS — I10 ESSENTIAL (PRIMARY) HYPERTENSION: ICD-10-CM

## 2023-08-10 RX ORDER — SODIUM CHLORIDE 9 MG/ML
5-250 INJECTION, SOLUTION INTRAVENOUS PRN
OUTPATIENT
Start: 2023-08-16

## 2023-08-16 ENCOUNTER — HOSPITAL ENCOUNTER (OUTPATIENT)
Facility: HOSPITAL | Age: 68
Setting detail: INFUSION SERIES
End: 2023-08-16

## 2023-08-21 ENCOUNTER — APPOINTMENT (OUTPATIENT)
Dept: INFUSION THERAPY | Age: 68
End: 2023-08-21

## 2023-08-31 DIAGNOSIS — E78.00 PURE HYPERCHOLESTEROLEMIA, UNSPECIFIED: ICD-10-CM

## 2023-08-31 DIAGNOSIS — I69.354 HEMIPLEGIA AND HEMIPARESIS FOLLOWING CEREBRAL INFARCTION AFFECTING LEFT NON-DOMINANT SIDE (HCC): ICD-10-CM

## 2023-08-31 NOTE — TELEPHONE ENCOUNTER
Last appointment: 7/13/23  Next appointment: none  Previous refill encounter(s): 3/16/23 #90 with 1 refill    Requested Prescriptions     Pending Prescriptions Disp Refills    simvastatin (ZOCOR) 10 MG tablet [Pharmacy Med Name: SIMVASTATIN 10 MG TABLET] 90 tablet 1     Sig: TAKE 1 TABLET BY MOUTH EVERY DAY AT [address] [place] [address] Only    Program: Medication Refill  CPA in place:    Recommendation Provided To:    Intervention Detail: New Rx: 1, reason: Patient Preference  Intervention Accepted By:   Mary Nava Closed?:    Time Spent (min): 5

## 2023-09-01 RX ORDER — SIMVASTATIN 10 MG
TABLET ORAL
Qty: 90 TABLET | Refills: 1 | Status: SHIPPED | OUTPATIENT
Start: 2023-09-01

## 2023-10-04 DIAGNOSIS — I69.354 HEMIPLEGIA AND HEMIPARESIS FOLLOWING CEREBRAL INFARCTION AFFECTING LEFT NON-DOMINANT SIDE (HCC): ICD-10-CM

## 2023-10-04 DIAGNOSIS — E78.00 PURE HYPERCHOLESTEROLEMIA, UNSPECIFIED: ICD-10-CM

## 2023-10-05 NOTE — TELEPHONE ENCOUNTER
Last appointment: 7/13/23  Next appointment: none  Previous refill encounter(s): 3/27/23 #90 with 1 refill    Requested Prescriptions     Pending Prescriptions Disp Refills    cloNIDine (CATAPRES) 0.2 MG tablet [Pharmacy Med Name: CLONIDINE HCL 0.2 MG TABLET] 90 tablet 1     Sig: TAKE 1 TABLET BY MOUTH EVERY DAY AT [address] [place] [address] Only    Program: Medication Refill  CPA in place:    Recommendation Provided To:    Intervention Detail: New Rx: 1, reason: Patient Preference  Intervention Accepted By:   Orin Sender Closed?:    Time Spent (min): 5

## 2023-10-06 RX ORDER — CLONIDINE HYDROCHLORIDE 0.2 MG/1
0.2 TABLET ORAL
Qty: 90 TABLET | Refills: 1 | Status: SHIPPED | OUTPATIENT
Start: 2023-10-06

## 2023-11-09 NOTE — TELEPHONE ENCOUNTER
Last appointment: 7/13/23  Next appointment: 12/5/23  Previous refill encounter(s): 4/18/23 #30 with 5 refills    Requested Prescriptions     Pending Prescriptions Disp Refills    amLODIPine (NORVASC) 10 MG tablet [Pharmacy Med Name: AMLODIPINE BESYLATE 10 MG TAB] 90 tablet 3     Sig: TAKE 1 TABLET BY MOUTH EVERY DAY FOR HIGH BLOOD PRESSURE         For Pharmacy Admin Tracking Only    Program: Medication Refill  CPA in place:    Recommendation Provided To:    Intervention Detail: New Rx: 1, reason: Patient Preference  Intervention Accepted By:   Tod Kevin Closed?:    Time Spent (min): 5 Yes

## 2023-11-10 RX ORDER — AMLODIPINE BESYLATE 10 MG/1
TABLET ORAL
Qty: 90 TABLET | Refills: 3 | Status: SHIPPED | OUTPATIENT
Start: 2023-11-10

## 2023-12-05 ENCOUNTER — OFFICE VISIT (OUTPATIENT)
Age: 68
End: 2023-12-05
Payer: COMMERCIAL

## 2023-12-05 VITALS
HEIGHT: 60 IN | HEART RATE: 78 BPM | BODY MASS INDEX: 23.36 KG/M2 | WEIGHT: 119 LBS | OXYGEN SATURATION: 96 % | TEMPERATURE: 98.4 F | SYSTOLIC BLOOD PRESSURE: 139 MMHG | DIASTOLIC BLOOD PRESSURE: 89 MMHG | RESPIRATION RATE: 16 BRPM

## 2023-12-05 DIAGNOSIS — D84.9 IMMUNODEFICIENCY, UNSPECIFIED (HCC): ICD-10-CM

## 2023-12-05 DIAGNOSIS — I10 HTN, GOAL BELOW 130/80: ICD-10-CM

## 2023-12-05 DIAGNOSIS — Z00.00 MEDICARE ANNUAL WELLNESS VISIT, SUBSEQUENT: Primary | ICD-10-CM

## 2023-12-05 DIAGNOSIS — E11.40 TYPE 2 DIABETES MELLITUS WITH DIABETIC NEUROPATHY, WITHOUT LONG-TERM CURRENT USE OF INSULIN (HCC): ICD-10-CM

## 2023-12-05 DIAGNOSIS — I63.411 CEREBROVASCULAR ACCIDENT (CVA) DUE TO EMBOLISM OF RIGHT MIDDLE CEREBRAL ARTERY (HCC): ICD-10-CM

## 2023-12-05 PROCEDURE — 1123F ACP DISCUSS/DSCN MKR DOCD: CPT | Performed by: FAMILY MEDICINE

## 2023-12-05 PROCEDURE — G0439 PPPS, SUBSEQ VISIT: HCPCS | Performed by: FAMILY MEDICINE

## 2023-12-05 PROCEDURE — 3074F SYST BP LT 130 MM HG: CPT | Performed by: FAMILY MEDICINE

## 2023-12-05 PROCEDURE — 3078F DIAST BP <80 MM HG: CPT | Performed by: FAMILY MEDICINE

## 2023-12-05 ASSESSMENT — LIFESTYLE VARIABLES
HOW OFTEN DO YOU HAVE A DRINK CONTAINING ALCOHOL: NEVER
HOW MANY STANDARD DRINKS CONTAINING ALCOHOL DO YOU HAVE ON A TYPICAL DAY: PATIENT DOES NOT DRINK

## 2023-12-05 ASSESSMENT — PATIENT HEALTH QUESTIONNAIRE - PHQ9
2. FEELING DOWN, DEPRESSED OR HOPELESS: 0
SUM OF ALL RESPONSES TO PHQ QUESTIONS 1-9: 0
SUM OF ALL RESPONSES TO PHQ9 QUESTIONS 1 & 2: 0
1. LITTLE INTEREST OR PLEASURE IN DOING THINGS: 0
SUM OF ALL RESPONSES TO PHQ QUESTIONS 1-9: 0

## 2023-12-05 NOTE — PATIENT INSTRUCTIONS
review. Other Preventive Recommendations:    A preventive eye exam performed by an eye specialist is recommended every 1-2 years to screen for glaucoma; cataracts, macular degeneration, and other eye disorders. A preventive dental visit is recommended every 6 months. Try to get at least 150 minutes of exercise per week or 10,000 steps per day on a pedometer . Order or download the FREE \"Exercise & Physical Activity: Your Everyday Guide\" from [employer]. Call 6-340.449.1781 or search [employer] online. You need 3727-9302 mg of calcium and 7861-8251 IU of vitamin D per day. It is possible to meet your calcium requirement with diet alone, but a vitamin D supplement is usually necessary to meet this goal.  When exposed to the sun, use a sunscreen that protects against both UVA and UVB radiation with an SPF of 30 or greater. Reapply every 2 to 3 hours or after sweating, drying off with a towel, or swimming. Always wear a seat belt when traveling in a car. Always wear a helmet when riding a bicycle or motorcycle.

## 2023-12-05 NOTE — PROGRESS NOTES
Medicare Annual Wellness Visit    Simin Marcano is here for Medicare AWV    Assessment & Plan   Medicare annual wellness visit, subsequent  Type 2 diabetes mellitus with diabetic neuropathy, without long-term current use of insulin ([address])  -     Diabetic Foot Exam  -     Lipid Panel; Future  -     CARLOS A Millard MD, Ophthalmology, [address] / Creatinine Urine Ratio; Future  -     Hemoglobin A1C; Future  -     CBC; Future  -     Comprehensive Metabolic Panel; Future  HTN, goal below 130/80  -     Diabetic Foot Exam  -     Lipid Panel; Future  -     CARLOS A Millard MD, Ophthalmology, [address] / Creatinine Urine Ratio; Future  -     Hemoglobin A1C; Future  -     CBC; Future  -     Comprehensive Metabolic Panel; Future  Cerebrovascular accident (CVA) due to embolism of right middle cerebral artery ([address])  -     Diabetic Foot Exam  -     Lipid Panel; Future  -     CARLOS A Millard MD, Ophthalmology, [address] / Creatinine Urine Ratio; Future  -     Hemoglobin A1C; Future  -     CBC; Future  -     Comprehensive Metabolic Panel; Future  Immunodeficiency, unspecified ([address])  -     Diabetic Foot Exam  -     Lipid Panel; Future  -     CARLOS A Millard MD, Ophthalmology, [address] / Creatinine Urine Ratio; Future  -     Hemoglobin A1C; Future  -     CBC; Future  -     Comprehensive Metabolic Panel; Future  Recommendations for Preventive Services Due: see orders and patient instructions/AVS.  Recommended screening schedule for the next 5-10 years is provided to the patient in written form: see Patient Instructions/AVS.     Return in 6 months (on 6/5/2024). Subjective       Patient's complete Health Risk Assessment and screening values have been reviewed and are found in Flowsheets.  The following problems were reviewed today and where indicated follow up appointments were made and/or referrals

## 2024-02-25 DIAGNOSIS — I10 ESSENTIAL (PRIMARY) HYPERTENSION: ICD-10-CM

## 2024-02-25 DIAGNOSIS — E78.00 PURE HYPERCHOLESTEROLEMIA, UNSPECIFIED: ICD-10-CM

## 2024-02-25 DIAGNOSIS — I69.354 HEMIPLEGIA AND HEMIPARESIS FOLLOWING CEREBRAL INFARCTION AFFECTING LEFT NON-DOMINANT SIDE (HCC): ICD-10-CM

## 2024-02-27 ENCOUNTER — HOSPITAL ENCOUNTER (EMERGENCY)
Facility: HOSPITAL | Age: 69
Discharge: HOME | End: 2024-02-27
Payer: COMMERCIAL

## 2024-02-27 ENCOUNTER — APPOINTMENT (OUTPATIENT)
Facility: HOSPITAL | Age: 69
End: 2024-02-27
Payer: COMMERCIAL

## 2024-02-27 VITALS
OXYGEN SATURATION: 97 % | HEIGHT: 60 IN | HEART RATE: 80 BPM | BODY MASS INDEX: 21.4 KG/M2 | DIASTOLIC BLOOD PRESSURE: 86 MMHG | WEIGHT: 109 LBS | SYSTOLIC BLOOD PRESSURE: 150 MMHG | RESPIRATION RATE: 18 BRPM

## 2024-02-27 DIAGNOSIS — M17.12 ARTHRITIS OF KNEE, LEFT: ICD-10-CM

## 2024-02-27 DIAGNOSIS — M25.462 KNEE EFFUSION, LEFT: Primary | ICD-10-CM

## 2024-02-27 PROCEDURE — 73562 X-RAY EXAM OF KNEE 3: CPT

## 2024-02-27 PROCEDURE — 6360000002 HC RX W HCPCS

## 2024-02-27 PROCEDURE — 99284 EMERGENCY DEPT VISIT MOD MDM: CPT

## 2024-02-27 PROCEDURE — 96372 THER/PROPH/DIAG INJ SC/IM: CPT

## 2024-02-27 RX ORDER — PREDNISONE 20 MG/1
TABLET ORAL
Qty: 15 TABLET | Refills: 0 | Status: SHIPPED | OUTPATIENT
Start: 2024-02-27 | End: 2024-03-05

## 2024-02-27 RX ORDER — KETOROLAC TROMETHAMINE 30 MG/ML
30 INJECTION, SOLUTION INTRAMUSCULAR; INTRAVENOUS ONCE
Status: COMPLETED | OUTPATIENT
Start: 2024-02-27 | End: 2024-02-27

## 2024-02-27 RX ADMIN — KETOROLAC TROMETHAMINE 30 MG: 30 INJECTION, SOLUTION INTRAMUSCULAR; INTRAVENOUS at 15:31

## 2024-02-27 ASSESSMENT — PAIN DESCRIPTION - ORIENTATION
ORIENTATION: LEFT
ORIENTATION: LEFT

## 2024-02-27 ASSESSMENT — PAIN DESCRIPTION - LOCATION
LOCATION: KNEE
LOCATION: KNEE

## 2024-02-27 ASSESSMENT — PAIN SCALES - GENERAL
PAINLEVEL_OUTOF10: 10
PAINLEVEL_OUTOF10: 10

## 2024-02-27 ASSESSMENT — PAIN - FUNCTIONAL ASSESSMENT
PAIN_FUNCTIONAL_ASSESSMENT: 0-10
PAIN_FUNCTIONAL_ASSESSMENT: PREVENTS OR INTERFERES SOME ACTIVE ACTIVITIES AND ADLS

## 2024-02-27 ASSESSMENT — PAIN DESCRIPTION - DESCRIPTORS: DESCRIPTORS: ACHING

## 2024-02-27 NOTE — DISCHARGE INSTRUCTIONS
IMPRESSION:  No acute fracture. There is a small joint effusion and there is mild  medial and lateral compartment joint space narrowing.

## 2024-02-27 NOTE — ED NOTES
Pt presents ambulatory to ED complaining of left knee pain and swelling that started about weeks ago per pt. The pain has affected the pts ability to ambulate. Pt has a hx of RA and having her knee drained of fluid. Pt reports that is has been years since she has had it drained. Pt took goodys today for the pain but reports minimal relief. Upon assessment pt has visible swelling on the left knee. Pt is alert and oriented x 4, RR even and unlabored, skin is warm and dry. Assessment completed and pt updated on plan of care.        Emergency Department Nursing Plan of Care        The Nursing Plan of Care is developed from the Nursing assessment and Emergency Department Attending provider initial evaluation.  The plan of care may be reviewed in the “ED Provider note”.

## 2024-02-27 NOTE — TELEPHONE ENCOUNTER
Last appointment: 12/5/23  Next appointment: 3/6/24  Previous refill encounter(s): 10/6/23 Catapres #90 with 1 refill, 9/1/23 Zocor #90 with 1 refill, 8/2/23 Lopressor #60 with 3 refills    Requested Prescriptions     Pending Prescriptions Disp Refills    simvastatin (ZOCOR) 10 MG tablet [Pharmacy Med Name: SIMVASTATIN 10 MG TABLET] 90 tablet 1     Sig: TAKE 1 TABLET BY MOUTH EVERY DAY AT NIGHT    metoprolol tartrate (LOPRESSOR) 25 MG tablet [Pharmacy Med Name: METOPROLOL TARTRATE 25 MG TAB] 180 tablet 1     Sig: TAKE 1 TABLET BY MOUTH TWICE A DAY    cloNIDine (CATAPRES) 0.2 MG tablet [Pharmacy Med Name: CLONIDINE HCL 0.2 MG TABLET] 90 tablet 1     Sig: TAKE 1 TABLET BY MOUTH EVERY DAY AT NIGHT         For Pharmacy Admin Tracking Only    Program: Medication Refill  CPA in place:    Recommendation Provided To:   Intervention Detail: New Rx: 3, reason: Patient Preference  Intervention Accepted By:   Gap Closed?:    Time Spent (min): 5

## 2024-02-27 NOTE — ED PROVIDER NOTES
[facility] EMERGENCY DEPT  EMERGENCY DEPARTMENT ENCOUNTER       Pt Name: Monika Mullins  MRN: [id number]  Birthdate 1955  Date of evaluation: 2/27/2024  Provider: ANTONIO Leger - SACHIN   PCP: Harsha Humphries MD  Note Started:  3:13 PM EST 2/27/24     CHIEF COMPLAINT       Chief Complaint   Patient presents with    Knee Pain        HISTORY OF PRESENT ILLNESS: 1 or more elements      History From: Patient  HPI Limitations: None     Monika Mullins is a 68 y.o. female with a past medical history of RA who presents complaining of left knee pain and edema x 2 weeks.  Patient reports that the pain is getting progressively worse, today was having a hard time with ambulation.  Patient is able to bear weight but it is painful.  Patient denies injury.  Patient denies systemic symptoms of infection including fever, chills, nausea, vomiting or diarrhea.  Denies prior surgical intervention to the knee, however she states she is to see an orthopedic who will intermittently drain the fluid off the knee.  Patient reports she is in the process of finding a new provider.     Nursing Notes were all reviewed and agreed with or any disagreements were addressed in the HPI.     REVIEW OF SYSTEMS      Review of Systems     Positives and Pertinent negatives as per HPI.    PAST HISTORY     Past Medical History:  Past Medical History:   Diagnosis Date    Arthritis     hands/all over    Diabetes mellitus (HCC) 6/15/2015    Dyspepsia and other specified disorders of function of stomach     Foot pain, bilateral 6/15/2015    Hemiparesis affecting left side as late effect of cerebrovascular accident (HCC) 4/18/2014    Hypercholesteremia 4/18/2014    Hypertension     Knee pain, bilateral 6/15/2015    Lipoma of back 5/14/2015    Nausea & vomiting     Prediabetes 5/14/2015    S/P stroke due to cerebrovascular disease 4/18/2014    Screening for breast cancer 9/15/2015    Type II diabetes mellitus with nephropathy (HCC) 7/23/2019       Past

## 2024-02-27 NOTE — ED TRIAGE NOTES
Pt to triage via wheelchair for L knee swelling and pain. Pt has hx of RA and frequently needs to have fluid drained from knee per pt and pts daughter. Pt reports increased swelling x2 weeks. Pt needs referral to rheumatologist. Pt unable to ambulate up and down stairs at home.

## 2024-03-01 RX ORDER — CLONIDINE HYDROCHLORIDE 0.2 MG/1
0.2 TABLET ORAL
Qty: 90 TABLET | Refills: 1 | Status: SHIPPED | OUTPATIENT
Start: 2024-03-01

## 2024-03-01 RX ORDER — SIMVASTATIN 10 MG
TABLET ORAL
Qty: 90 TABLET | Refills: 1 | Status: SHIPPED | OUTPATIENT
Start: 2024-03-01

## 2024-04-25 ENCOUNTER — OFFICE VISIT (OUTPATIENT)
Age: 69
End: 2024-04-25
Payer: MEDICARE

## 2024-04-25 VITALS
DIASTOLIC BLOOD PRESSURE: 85 MMHG | SYSTOLIC BLOOD PRESSURE: 142 MMHG | BODY MASS INDEX: 22.07 KG/M2 | RESPIRATION RATE: 18 BRPM | WEIGHT: 113 LBS | TEMPERATURE: 98 F | OXYGEN SATURATION: 97 % | HEART RATE: 95 BPM

## 2024-04-25 DIAGNOSIS — R79.89 OTHER SPECIFIED ABNORMAL FINDINGS OF BLOOD CHEMISTRY: ICD-10-CM

## 2024-04-25 DIAGNOSIS — E11.21 TYPE II DIABETES MELLITUS WITH NEPHROPATHY (HCC): ICD-10-CM

## 2024-04-25 DIAGNOSIS — M05.9 SEROPOSITIVE RHEUMATOID ARTHRITIS (HCC): Primary | ICD-10-CM

## 2024-04-25 DIAGNOSIS — E55.9 VITAMIN D DEFICIENCY, UNSPECIFIED: ICD-10-CM

## 2024-04-25 DIAGNOSIS — I63.9 CEREBROVASCULAR ACCIDENT (CVA), UNSPECIFIED MECHANISM (HCC): ICD-10-CM

## 2024-04-25 DIAGNOSIS — E11.42 DIABETIC POLYNEUROPATHY ASSOCIATED WITH TYPE 2 DIABETES MELLITUS (HCC): ICD-10-CM

## 2024-04-25 DIAGNOSIS — Z12.31 ENCOUNTER FOR SCREENING MAMMOGRAM FOR BREAST CANCER: ICD-10-CM

## 2024-04-25 DIAGNOSIS — E11.65 CONTROLLED TYPE 2 DIABETES MELLITUS WITH HYPERGLYCEMIA, WITHOUT LONG-TERM CURRENT USE OF INSULIN (HCC): ICD-10-CM

## 2024-04-25 PROCEDURE — 99214 OFFICE O/P EST MOD 30 MIN: CPT | Performed by: FAMILY MEDICINE

## 2024-04-25 PROCEDURE — 3077F SYST BP >= 140 MM HG: CPT | Performed by: FAMILY MEDICINE

## 2024-04-25 PROCEDURE — 3079F DIAST BP 80-89 MM HG: CPT | Performed by: FAMILY MEDICINE

## 2024-04-25 PROCEDURE — 1123F ACP DISCUSS/DSCN MKR DOCD: CPT | Performed by: FAMILY MEDICINE

## 2024-04-25 RX ORDER — PREGABALIN 50 MG/1
50 CAPSULE ORAL NIGHTLY
Qty: 30 CAPSULE | Refills: 3 | Status: SHIPPED | OUTPATIENT
Start: 2024-04-25 | End: 2024-08-23

## 2024-04-25 ASSESSMENT — PATIENT HEALTH QUESTIONNAIRE - PHQ9
SUM OF ALL RESPONSES TO PHQ QUESTIONS 1-9: 0
2. FEELING DOWN, DEPRESSED OR HOPELESS: NOT AT ALL
SUM OF ALL RESPONSES TO PHQ QUESTIONS 1-9: 0
1. LITTLE INTEREST OR PLEASURE IN DOING THINGS: NOT AT ALL
SUM OF ALL RESPONSES TO PHQ9 QUESTIONS 1 & 2: 0

## 2024-04-25 NOTE — PROGRESS NOTES
Chief Complaint   Patient presents with    Knee Pain       \"Have you been to the ER, urgent care clinic since your last visit?  Hospitalized since your last visit?\"    NO    “Have you seen or consulted any other health care providers outside of [facility] since your last visit?”    NO            Vitals:    24 1437 24 1438   BP: (!) 153/90 (!) 142/85   Site: Left Upper Arm Right Upper Arm   Position: Sitting Sitting   Cuff Size: Large Adult Large Adult   Pulse: 95    Resp: 18    Temp: 98 °F (36.7 °C)    TempSrc: Infrared    SpO2: 97%    Weight: 51.3 kg (113 lb)         Health Maintenance Due   Topic Date Due    Diabetic retinal exam  Never done    Diabetic Alb to Cr ratio (uACR) test  2020    Lipids  10/20/2023    Annual Wellness Visit (Medicare Advantage)  2024    GFR test (Diabetes, CKD 3-4, OR last GFR 15-59)  2024        The patient, Monika MARIAELENA Mullins, identity was verified by name and    
Patient alert cooperative   HEENT; normocephalic and atraumatic     Neck: supple no adenopathy no JVD no bruit  Lungs: Clear to auscultation bilaterally no rales rhonchi or wheezes  Heart: Normal regular S1-S2 s no murmur  Breast exam deferred  Abdomen: Soft nontender normal bowel sounds    Extremities: abnormal range of motion ++ point tenderness normal pulses no edema,   Pelvic/: No lesion, no lymphadenopathy  Skin: abormal no lesion no rash      Assessment & Plan   1. Seropositive rheumatoid arthritis (HCC)  -     CBC; Future  -     Comprehensive Metabolic Panel; Future  -     Lipid Panel; Future  -     Hemoglobin A1C; Future  -     TSH; Future  -     [facility], [place]  -     pregabalin (LYRICA) 50 MG capsule; Take 1 capsule by mouth nightly for 120 days. Max Daily Amount: 50 mg, Disp-30 capsule, R-3Normal  2. Type II diabetes mellitus with nephropathy (HCC)  -     [facility]  -     CBC; Future  -     Comprehensive Metabolic Panel; Future  -     Lipid Panel; Future  -     Hemoglobin A1C; Future  -     TSH; Future  -     pregabalin (LYRICA) 50 MG capsule; Take 1 capsule by mouth nightly for 120 days. Max Daily Amount: 50 mg, Disp-30 capsule, R-3Normal  3. Controlled type 2 diabetes mellitus with hyperglycemia, without long-term current use of insulin ([facility])  -     Microalbumin / Creatinine Urine Ratio; Future  -     CBC; Future  -     Comprehensive Metabolic Panel; Future  -     Lipid Panel; Future  -     Hemoglobin A1C; Future  -     TSH; Future  4. Encounter for screening mammogram for breast cancer  -     [facility] DIGITAL SCREEN W OR WO CAD BILATERAL; Future  5. Other specified abnormal findings of blood chemistry  -     [facility]  6. Cerebrovascular accident (CVA), unspecified mechanism (HCC)  -     CBC; Future  -     Comprehensive Metabolic Panel; Future  -     Lipid Panel; Future  -

## 2024-04-26 LAB
25(OH)D3+25(OH)D2 SERPL-MCNC: 29.1 NG/ML (ref 30–100)
ALBUMIN SERPL-MCNC: 4.1 G/DL (ref 3.9–4.9)
ALBUMIN/GLOB SERPL: 1.4 {RATIO} (ref 1.2–2.2)
ALP SERPL-CCNC: 122 IU/L (ref 44–121)
ALT SERPL-CCNC: 12 IU/L (ref 0–32)
AST SERPL-CCNC: 17 IU/L (ref 0–40)
BILIRUB SERPL-MCNC: 0.6 MG/DL (ref 0–1.2)
BUN SERPL-MCNC: 16 MG/DL (ref 8–27)
BUN/CREAT SERPL: 25 (ref 12–28)
CALCIUM SERPL-MCNC: 9.4 MG/DL (ref 8.7–10.3)
CHLORIDE SERPL-SCNC: 102 MMOL/L (ref 96–106)
CHOLEST SERPL-MCNC: 173 MG/DL (ref 100–199)
CO2 SERPL-SCNC: 21 MMOL/L (ref 20–29)
CREAT SERPL-MCNC: 0.65 MG/DL (ref 0.57–1)
EGFRCR SERPLBLD CKD-EPI 2021: 97 ML/MIN/1.73
ERYTHROCYTE [DISTWIDTH] IN BLOOD BY AUTOMATED COUNT: 13.1 % (ref 11.7–15.4)
GLOBULIN SER CALC-MCNC: 3 G/DL (ref 1.5–4.5)
GLUCOSE SERPL-MCNC: 112 MG/DL (ref 70–99)
HBA1C MFR BLD: 7.4 % (ref 4.8–5.6)
HCT VFR BLD AUTO: 37.8 % (ref 34–46.6)
HDLC SERPL-MCNC: 68 MG/DL
HGB BLD-MCNC: 12.3 G/DL (ref 11.1–15.9)
LDLC SERPL CALC-MCNC: 94 MG/DL (ref 0–99)
MCH RBC QN AUTO: 29.1 PG (ref 26.6–33)
MCHC RBC AUTO-ENTMCNC: 32.5 G/DL (ref 31.5–35.7)
MCV RBC AUTO: 90 FL (ref 79–97)
PLATELET # BLD AUTO: 386 X10E3/UL (ref 150–450)
POTASSIUM SERPL-SCNC: 4.4 MMOL/L (ref 3.5–5.2)
PROT SERPL-MCNC: 7.1 G/DL (ref 6–8.5)
RBC # BLD AUTO: 4.22 X10E6/UL (ref 3.77–5.28)
SODIUM SERPL-SCNC: 140 MMOL/L (ref 134–144)
TRIGL SERPL-MCNC: 57 MG/DL (ref 0–149)
TSH SERPL DL<=0.005 MIU/L-ACNC: 1.32 UIU/ML (ref 0.45–4.5)
VLDLC SERPL CALC-MCNC: 11 MG/DL (ref 5–40)
WBC # BLD AUTO: 9.4 X10E3/UL (ref 3.4–10.8)

## 2024-04-29 ENCOUNTER — TELEPHONE (OUTPATIENT)
Age: 69
End: 2024-04-29

## 2024-04-29 NOTE — TELEPHONE ENCOUNTER
Patient states that she spoke to someone at the gastroenterologist office they wanted to know why she is coming to them patient did not understand please give her a call @ [phone]

## 2024-04-29 NOTE — TELEPHONE ENCOUNTER
Returned call to pt, stated she didn't know why she needed to see gastroenterology,  informed It is time for her colonoscopy, blood in stool .  Pt stated understanding.  Stated Will call to schedule an appointment

## 2024-05-16 ENCOUNTER — TELEPHONE (OUTPATIENT)
Age: 69
End: 2024-05-16

## 2024-05-16 NOTE — TELEPHONE ENCOUNTER
LVM for patient to call us back to schedule NP miriam with Evelyn Eid, per provider ok to schedule next available NP miriam

## 2024-05-28 ENCOUNTER — OFFICE VISIT (OUTPATIENT)
Age: 69
End: 2024-05-28
Payer: MEDICARE

## 2024-05-28 VITALS
HEART RATE: 84 BPM | WEIGHT: 113 LBS | RESPIRATION RATE: 18 BRPM | BODY MASS INDEX: 22.07 KG/M2 | DIASTOLIC BLOOD PRESSURE: 76 MMHG | SYSTOLIC BLOOD PRESSURE: 137 MMHG | OXYGEN SATURATION: 94 % | TEMPERATURE: 97.7 F

## 2024-05-28 DIAGNOSIS — E11.40 TYPE 2 DIABETES MELLITUS WITH DIABETIC NEUROPATHY, WITHOUT LONG-TERM CURRENT USE OF INSULIN (HCC): Primary | ICD-10-CM

## 2024-05-28 PROCEDURE — 3075F SYST BP GE 130 - 139MM HG: CPT | Performed by: FAMILY MEDICINE

## 2024-05-28 PROCEDURE — 1123F ACP DISCUSS/DSCN MKR DOCD: CPT | Performed by: FAMILY MEDICINE

## 2024-05-28 PROCEDURE — 99213 OFFICE O/P EST LOW 20 MIN: CPT | Performed by: FAMILY MEDICINE

## 2024-05-28 PROCEDURE — 3051F HG A1C>EQUAL 7.0%<8.0%: CPT | Performed by: FAMILY MEDICINE

## 2024-05-28 PROCEDURE — 3078F DIAST BP <80 MM HG: CPT | Performed by: FAMILY MEDICINE

## 2024-05-28 RX ORDER — PREGABALIN 75 MG/1
75 CAPSULE ORAL
Qty: 30 CAPSULE | Refills: 3 | Status: SHIPPED | OUTPATIENT
Start: 2024-05-28 | End: 2024-09-25

## 2024-05-28 NOTE — PROGRESS NOTES
Monika Mullins (:  1955) is a 68 y.o. female,Established patient, here for evaluation of the following chief complaint(s):  Knee Pain (Left knee Follow up )      Pt has been off metformin since last yr,  has been diet controlled opting on pharmacotx,loves her potatoes    Hemoglobin A1C  4.8 - 5.6 % 7.4 High  8.0 High       Today patient complains of paresthesia, patient has the sensation of the pins and needles, denies any herpetic lesion in the past,the med has been helping  unfortunately   patient ran out of the medication and since then the pain and tingling sensation is worsening without the current medication is 7 out of 10 radiating down to bilateral toes and hands and the condition not getting better without the medication      Constitutional: no chills and fever,  , nad     HENT: no ear pain or nosebleeds. No blurred vision  Respiratory: no shortness of breath, wheezing cough   Cardiovascular: Has no chest pain, ,and racing heart .   Gastrointestinal: No constipation, diarrhea, nausea and vomiting.   Genitourinary: No frequency.   Musculoskeletal: ++for joint pain.   Skin: no itching, no rash.   Neurological: Negative for dizziness, no tremors  Psychiatric/Behavioral: Negative for depression, is not nervous/anxious.        Constitutional: Well developed, well nourished.  non-toxic in appearance, not diaphoretic.   HEENT: PERRL. EOMI. The left TM is unremarkable. The right TM is unremarkable. No nasal  erythema noted.  THROAT: Posterior pharynx has no erythema, no exudates.    Neck:  no cervical lymphadenopathy. Neck is supple   Cardiovascular: Regular rate and rhythm, no murmurs, rubs, or gallops.   Pulmonary: Clear to auscultation bilaterally. Has no wheezing, rales or rhonchi.,  speaking in full sentences, has no accessory muscle used.  Abdomen: Bowel sounds are normal. Having no distension, no palpable mass. Soft,  No tenderness, rebound or guarding.   Musculoskeletal: No peripheral edema,

## 2024-05-28 NOTE — PROGRESS NOTES
Chief Complaint   Patient presents with    Knee Pain     Left knee Follow up        \"Have you been to the ER, urgent care clinic since your last visit?  Hospitalized since your last visit?\"    NO    “Have you seen or consulted any other health care providers outside of [facility] since your last visit?”    NO       Vitals:    24 1457   BP: 137/76   Pulse: 84   Resp: 18   Temp: 97.7 °F (36.5 °C)   TempSrc: Infrared   SpO2: 94%   Weight: 51.3 kg (113 lb)        Health Maintenance Due   Topic Date Due    Diabetic retinal exam  Never done    Diabetic Alb to Cr ratio (uACR) test  2020    Annual Wellness Visit (Medicare Advantage)  2024        The patient, Monika Mullins, identity was verified by name and

## 2024-06-06 ENCOUNTER — TELEPHONE (OUTPATIENT)
Age: 69
End: 2024-06-06

## 2024-06-06 NOTE — TELEPHONE ENCOUNTER
Patient missed Rheumatology appointment , patient  was Scheduled 22, 2024. Patient's daughter requesting a call back at [phone] to request that PCP call Rheumatology. Patient was to see Dr. Dante Eid ,

## 2024-06-10 ENCOUNTER — OFFICE VISIT (OUTPATIENT)
Age: 69
End: 2024-06-10
Payer: MEDICARE

## 2024-06-10 VITALS
WEIGHT: 110 LBS | HEART RATE: 93 BPM | BODY MASS INDEX: 21.48 KG/M2 | SYSTOLIC BLOOD PRESSURE: 171 MMHG | RESPIRATION RATE: 16 BRPM | DIASTOLIC BLOOD PRESSURE: 89 MMHG | OXYGEN SATURATION: 94 % | TEMPERATURE: 97.7 F

## 2024-06-10 DIAGNOSIS — E53.8 DEFICIENCY OF OTHER SPECIFIED B GROUP VITAMINS: Primary | ICD-10-CM

## 2024-06-10 PROCEDURE — 99214 OFFICE O/P EST MOD 30 MIN: CPT | Performed by: PEDIATRICS

## 2024-06-10 PROCEDURE — 1123F ACP DISCUSS/DSCN MKR DOCD: CPT | Performed by: PEDIATRICS

## 2024-06-10 PROCEDURE — 3079F DIAST BP 80-89 MM HG: CPT | Performed by: PEDIATRICS

## 2024-06-10 PROCEDURE — 3077F SYST BP >= 140 MM HG: CPT | Performed by: PEDIATRICS

## 2024-06-10 RX ORDER — METHOTREXATE 2.5 MG/1
10 TABLET ORAL WEEKLY
Qty: 48 TABLET | Refills: 1 | Status: SHIPPED | OUTPATIENT
Start: 2024-06-10

## 2024-06-10 RX ORDER — PREDNISONE 5 MG/1
5 TABLET ORAL 2 TIMES DAILY
Qty: 60 TABLET | Refills: 1 | Status: SHIPPED | OUTPATIENT
Start: 2024-06-10

## 2024-06-10 RX ORDER — BELIMUMAB 200 MG/ML
200 SOLUTION SUBCUTANEOUS WEEKLY
Qty: 4 EACH | Refills: 3 | Status: SHIPPED | OUTPATIENT
Start: 2024-06-10 | End: 2024-06-10

## 2024-06-10 RX ORDER — ACETAMINOPHEN 500 MG
500 TABLET ORAL EVERY 6 HOURS PRN
COMMUNITY

## 2024-06-10 RX ORDER — ADALIMUMAB 40MG/0.4ML
40 KIT SUBCUTANEOUS
Qty: 2 EACH | Refills: 3 | Status: ACTIVE | OUTPATIENT
Start: 2024-06-10

## 2024-06-10 RX ORDER — FOLIC ACID 1 MG/1
1 TABLET ORAL DAILY
Qty: 90 TABLET | Refills: 1 | Status: SHIPPED | OUTPATIENT
Start: 2024-06-10

## 2024-06-10 ASSESSMENT — PATIENT HEALTH QUESTIONNAIRE - PHQ9
SUM OF ALL RESPONSES TO PHQ QUESTIONS 1-9: 0
SUM OF ALL RESPONSES TO PHQ9 QUESTIONS 1 & 2: 0
2. FEELING DOWN, DEPRESSED OR HOPELESS: NOT AT ALL
1. LITTLE INTEREST OR PLEASURE IN DOING THINGS: NOT AT ALL
SUM OF ALL RESPONSES TO PHQ QUESTIONS 1-9: 0

## 2024-06-10 NOTE — PATIENT INSTRUCTIONS
Prednisone 10mg daily x 1 week  Prednisone 7.5mg daily x 1 week  Prednisone 5mg daily x 1 week  Prednisone 2.5mg daily x 1 week     Start methotexate 10mg weekly (4 tablets)

## 2024-06-10 NOTE — PROGRESS NOTES
Chief Complaint   Patient presents with    Joint Pain     1. Have you been to the ER, urgent care clinic since your last visit?  Hospitalized since your last visit?No    2. Have you seen or consulted any other health care providers outside of the [facility] since your last visit?  Include any pap smears or colon screening. No    
by Dr. Billy in 6/2023. She has signs of active inflammation on exam today so I will prescribe prednisone 10 mg daily to help reduce current inflammation. She had a good response to infliximab, but had AE's of angioedema so recommend she start adalimumab 40 mg q2 weeks. We will restart her on methotrexate 10 mg PO weekly. No labs needed today.   2. Drug therapy monitoring for toxicity (DMARD and/or biologic/SHAHID inhibitor) - Recommend CBC, CMP every 4 months. Recommend TB yearly for biologic and SHAHID inhibitor approval and monioring. Recommend yearly eye exam for retinal toxicity if on plaquenil.   3. New medication - Anti-TNF therapy (humira) - A written summary, as prepared by the [employer] was provided.  The patient was given the opportunity to ask questions, and verbalized understanding of the following: The most common side effect seen with the injectable drugs (adalimumab) are skin reactions, commonly referred to as \"injection site reactions.\"  This can last up to 1 week.  The most significant side effects of anti-TNF therapy are increased risk for all types of infections, including TB and fungal infections. Some of these infections may be severe.  The patient will be tested for TB and hepatitis prior to starting therapy.  The medication should be stopped if there is high fever or if the patient is being treated with antibiotics for an infection.  Long term anti-TNF agents may increase the risk of cancers such as lymphoma and skin cancer.  There are rare neurologic complications from the use of these medications. People who have a history of multiple sclerosis should not use them. People with significant heart failure should not be on anti-TNF therapy.  Using these medications may make the vaccination less effective and live vaccines should not be given.      PLAN  1. Start prednisone 10 mg daily - taper once she starts biologic   2. Restart methotrexate 10 mg PO weekly  3. Seek approval

## 2024-07-18 NOTE — PATIENT INSTRUCTIONS
Thank you for visiting [facility]!      For future medication refills, we require updated lab results and an upcoming appointment to be in our system prior to refilling prescriptions.  Without these two things, your refill will be DENIED.  If you miss your upcoming appointment, your refill will also be DENIED.      We appreciate your understanding of this critical clinical aspect of our practice. -Dr. Downs & Evelyn, NP

## 2024-07-30 ENCOUNTER — OFFICE VISIT (OUTPATIENT)
Age: 69
End: 2024-07-30
Payer: MEDICARE

## 2024-07-30 VITALS
BODY MASS INDEX: 22.26 KG/M2 | DIASTOLIC BLOOD PRESSURE: 81 MMHG | OXYGEN SATURATION: 94 % | TEMPERATURE: 98 F | SYSTOLIC BLOOD PRESSURE: 145 MMHG | RESPIRATION RATE: 16 BRPM | WEIGHT: 114 LBS | HEART RATE: 95 BPM

## 2024-07-30 DIAGNOSIS — M81.0 AGE-RELATED OSTEOPOROSIS WITHOUT CURRENT PATHOLOGICAL FRACTURE: ICD-10-CM

## 2024-07-30 DIAGNOSIS — M05.9 RHEUMATOID ARTHRITIS WITH RHEUMATOID FACTOR, UNSPECIFIED (HCC): Primary | ICD-10-CM

## 2024-07-30 DIAGNOSIS — E55.9 VITAMIN D DEFICIENCY, UNSPECIFIED: ICD-10-CM

## 2024-07-30 DIAGNOSIS — Z79.899 OTHER LONG TERM (CURRENT) DRUG THERAPY: ICD-10-CM

## 2024-07-30 PROCEDURE — 3077F SYST BP >= 140 MM HG: CPT | Performed by: PEDIATRICS

## 2024-07-30 PROCEDURE — 3079F DIAST BP 80-89 MM HG: CPT | Performed by: PEDIATRICS

## 2024-07-30 PROCEDURE — 99214 OFFICE O/P EST MOD 30 MIN: CPT | Performed by: PEDIATRICS

## 2024-07-30 PROCEDURE — 1123F ACP DISCUSS/DSCN MKR DOCD: CPT | Performed by: PEDIATRICS

## 2024-07-30 ASSESSMENT — PATIENT HEALTH QUESTIONNAIRE - PHQ9
SUM OF ALL RESPONSES TO PHQ QUESTIONS 1-9: 0
SUM OF ALL RESPONSES TO PHQ QUESTIONS 1-9: 0
SUM OF ALL RESPONSES TO PHQ9 QUESTIONS 1 & 2: 0
2. FEELING DOWN, DEPRESSED OR HOPELESS: NOT AT ALL
SUM OF ALL RESPONSES TO PHQ QUESTIONS 1-9: 0
SUM OF ALL RESPONSES TO PHQ QUESTIONS 1-9: 0
1. LITTLE INTEREST OR PLEASURE IN DOING THINGS: NOT AT ALL

## 2024-07-30 NOTE — PROGRESS NOTES
Chief Complaint   Patient presents with    Joint Pain     1. Have you been to the ER, urgent care clinic since your last visit?  Hospitalized since your last visit?No    2. Have you seen or consulted any other health care providers outside of the [facility] since your last visit?  Include any pap smears or colon screening. No    
monioring. Recommend yearly eye exam for retinal toxicity if on plaquenil.     PLAN  1. Methotrexate 10 mg PO weekly  2. Humira 40 mg q2 weeks - consider increasing to weekly if she continues to have symptoms  3. Prednisone 5 mg PRN  4. Check CBC, CMP, TB (PPD)  5. Follow up 3 months    Sonia Downs MD  Adult and Pediatric Rheumatology     [facility]   [address] [place] [address], Phone [phone], Fax [phone]     Visiting  of Pediatrics    Department of Pediatrics, [facility]   [address], [place] [address], Phone [phone], Fax [phone]    Patient Instructions   Thank you for visiting [facility] [place]!      For future medication refills, we require updated lab results and an upcoming appointment to be in our system prior to refilling prescriptions.  Without these two things, your refill will be DENIED.  If you miss your upcoming appointment, your refill will also be DENIED.      We appreciate your understanding of this critical clinical aspect of our practice. -Dr. Downs & AAKASH Rivas     cc:  Harsha Humphries MD I, Sonia Downs MD, personally performed the services described in the documentation as scribed by Lilo Son in my presence and have reviewed and agree with the note as scribed.     Total time was 40 minutes, greater than 50% of which was spent in counseling and coordination of care.  The diagnosis, treatment and various other items were discussed in detail: Test results, medication options, possible side effects, lifestyle changes.

## 2024-08-01 DIAGNOSIS — E53.8 DEFICIENCY OF OTHER SPECIFIED B GROUP VITAMINS: ICD-10-CM

## 2024-08-01 NOTE — TELEPHONE ENCOUNTER
Last appointment: 5/28/24  Next appointment: 8/14/24  Previous refill encounter(s): 7/12/23    Requested Prescriptions     Pending Prescriptions Disp Refills    vitamin B-12 (CYANOCOBALAMIN) 1000 MCG tablet 90 tablet 3     Sig: Take 1 tablet by mouth daily         For Pharmacy Admin Tracking Only    Program: Medication Refill  CPA in place:    Recommendation Provided To:   Intervention Detail: New Rx: 1, reason: Patient Preference  Intervention Accepted By:   Gap Closed?:    Time Spent (min): 5

## 2024-08-06 RX ORDER — LANOLIN ALCOHOL/MO/W.PET/CERES
1000 CREAM (GRAM) TOPICAL DAILY
Qty: 90 TABLET | Refills: 3 | Status: SHIPPED | OUTPATIENT
Start: 2024-08-06

## 2024-08-14 ENCOUNTER — OFFICE VISIT (OUTPATIENT)
Age: 69
End: 2024-08-14
Payer: MEDICARE

## 2024-08-14 VITALS
TEMPERATURE: 97.9 F | SYSTOLIC BLOOD PRESSURE: 138 MMHG | RESPIRATION RATE: 18 BRPM | WEIGHT: 115 LBS | BODY MASS INDEX: 22.58 KG/M2 | HEART RATE: 84 BPM | HEIGHT: 60 IN | DIASTOLIC BLOOD PRESSURE: 88 MMHG | OXYGEN SATURATION: 96 %

## 2024-08-14 DIAGNOSIS — Z00.00 MEDICARE ANNUAL WELLNESS VISIT, SUBSEQUENT: Primary | ICD-10-CM

## 2024-08-14 DIAGNOSIS — I10 HTN, GOAL BELOW 130/80: ICD-10-CM

## 2024-08-14 DIAGNOSIS — E11.69 CONTROLLED TYPE 2 DIABETES MELLITUS WITH OTHER SPECIFIED COMPLICATION, WITHOUT LONG-TERM CURRENT USE OF INSULIN (HCC): ICD-10-CM

## 2024-08-14 DIAGNOSIS — K51.018 ULCERATIVE PANCOLITIS WITH OTHER COMPLICATION (HCC): ICD-10-CM

## 2024-08-14 DIAGNOSIS — M05.9 SEROPOSITIVE RHEUMATOID ARTHRITIS (HCC): ICD-10-CM

## 2024-08-14 DIAGNOSIS — I69.359 CVA, OLD, HEMIPARESIS (HCC): ICD-10-CM

## 2024-08-14 DIAGNOSIS — H54.3 DECREASED VISION IN BOTH EYES: ICD-10-CM

## 2024-08-14 PROCEDURE — G0439 PPPS, SUBSEQ VISIT: HCPCS | Performed by: FAMILY MEDICINE

## 2024-08-14 PROCEDURE — 3051F HG A1C>EQUAL 7.0%<8.0%: CPT | Performed by: FAMILY MEDICINE

## 2024-08-14 PROCEDURE — 3075F SYST BP GE 130 - 139MM HG: CPT | Performed by: FAMILY MEDICINE

## 2024-08-14 PROCEDURE — 1123F ACP DISCUSS/DSCN MKR DOCD: CPT | Performed by: FAMILY MEDICINE

## 2024-08-14 PROCEDURE — 3079F DIAST BP 80-89 MM HG: CPT | Performed by: FAMILY MEDICINE

## 2024-08-14 SDOH — ECONOMIC STABILITY: FOOD INSECURITY: WITHIN THE PAST 12 MONTHS, YOU WORRIED THAT YOUR FOOD WOULD RUN OUT BEFORE YOU GOT MONEY TO BUY MORE.: NEVER TRUE

## 2024-08-14 SDOH — ECONOMIC STABILITY: FOOD INSECURITY: WITHIN THE PAST 12 MONTHS, THE FOOD YOU BOUGHT JUST DIDN'T LAST AND YOU DIDN'T HAVE MONEY TO GET MORE.: NEVER TRUE

## 2024-08-14 SDOH — ECONOMIC STABILITY: INCOME INSECURITY: HOW HARD IS IT FOR YOU TO PAY FOR THE VERY BASICS LIKE FOOD, HOUSING, MEDICAL CARE, AND HEATING?: NOT HARD AT ALL

## 2024-08-14 ASSESSMENT — PATIENT HEALTH QUESTIONNAIRE - PHQ9
SUM OF ALL RESPONSES TO PHQ QUESTIONS 1-9: 0
SUM OF ALL RESPONSES TO PHQ QUESTIONS 1-9: 0
2. FEELING DOWN, DEPRESSED OR HOPELESS: NOT AT ALL
SUM OF ALL RESPONSES TO PHQ QUESTIONS 1-9: 0
SUM OF ALL RESPONSES TO PHQ QUESTIONS 1-9: 0
SUM OF ALL RESPONSES TO PHQ9 QUESTIONS 1 & 2: 0
1. LITTLE INTEREST OR PLEASURE IN DOING THINGS: NOT AT ALL

## 2024-08-14 ASSESSMENT — LIFESTYLE VARIABLES
HOW MANY STANDARD DRINKS CONTAINING ALCOHOL DO YOU HAVE ON A TYPICAL DAY: PATIENT DOES NOT DRINK
HOW OFTEN DO YOU HAVE A DRINK CONTAINING ALCOHOL: NEVER

## 2024-08-14 NOTE — PROGRESS NOTES
Chief Complaint   Patient presents with    Medicare AWV       \"Have you been to the ER, urgent care clinic since your last visit?  Hospitalized since your last visit?\"    NO    “Have you seen or consulted any other health care providers outside of [facility] since your last visit?”    NO            Click Here for Release of Records Request     No results found for this visit on 24.   Vitals:    24 1528   BP: 138/88   Pulse: 84   Resp: 18   Temp: 97.9 °F (36.6 °C)   TempSrc: Infrared   SpO2: 96%   Weight: 52.2 kg (115 lb)   Height: 1.524 m (5')      Health Maintenance Due   Topic Date Due    COVID-19 Vaccine (1) Never done    Pneumococcal 65+ years Vaccine (1 of 2 - PCV) Never done    Diabetic retinal exam  Never done    Shingles vaccine (2 of 2) 2020    Diabetic Alb to Cr ratio (uACR) test  2020    Annual Wellness Visit (Medicare Advantage)  2024    Flu vaccine (1) 2024        The patient, Monika MARIAELENA Mullins, identity was verified by name and .   
   08/14/24 1528   BP: 138/88   Pulse: 84   Resp: 18   Temp: 97.9 °F (36.6 °C)   TempSrc: Infrared   SpO2: 96%   Weight: 52.2 kg (115 lb)   Height: 1.524 m (5')      Body mass index is 22.46 kg/m².          Constitutional: Well developed, well nourished.  non-toxic in appearance, not diaphoretic.   HEENT: PERRL. EOMI. The left TM is unremarkable. The right TM is unremarkable. No nasal  erythema noted.  THROAT: Posterior pharynx has no erythema, no exudates.    Neck:  no cervical lymphadenopathy. Neck is supple   Cardiovascular: Regular rate and rhythm, no murmurs, rubs, or gallops.   Pulmonary: Clear to auscultation bilaterally. Has no wheezing, rales or rhonchi.,  speaking in full sentences, has no accessory muscle used.  Abdomen: Bowel sounds are normal. Having no distension, no palpable mass. Soft,  No tenderness, rebound or guarding.   Musculoskeletal: No peripheral edema, having abnormal ROM  Skin:   warm and dry. No diaphoresis, rashes, or lesions.   Neurological: Alert, awake,  oriented x3 ,  normal speech.                 Allergies   Allergen Reactions    Infliximab Angioedema     2/2023 infusion -> lip swelling    Lisinopril Swelling    Codeine Nausea And Vomiting and Other (See Comments)     Prior to Visit Medications    Medication Sig Taking? Authorizing Provider   vitamin B-12 (CYANOCOBALAMIN) 1000 MCG tablet Take 1 tablet by mouth daily  Harsha Humphries MD   acetaminophen (TYLENOL) 500 MG tablet Take 1 tablet by mouth every 6 hours as needed for Pain  ProviderShayla MD   predniSONE (DELTASONE) 5 MG tablet Take 1 tablet by mouth 2 times daily  Sonia Downs MD   methotrexate (RHEUMATREX) 2.5 MG chemo tablet Take 4 tablets by mouth once a week  Sonia Downs MD   folic acid (FOLVITE) 1 MG tablet Take 1 tablet by mouth daily  Sonia Downs MD   adalimumab (HUMIRA, 2 PEN,) 40 MG/0.4ML PNKT Inject 40 mg into the skin every 14 days  Sonia Downs MD   pregabalin (LYRICA) 75 MG capsule Take 1

## 2024-08-14 NOTE — PATIENT INSTRUCTIONS
your family, and various assessments and screenings as appropriate.    After reviewing your medical record and screening and assessments performed today your provider may have ordered immunizations, labs, imaging, and/or referrals for you.  A list of these orders (if applicable) as well as your Preventive Care list are included within your After Visit Summary for your review.    Other Preventive Recommendations:    A preventive eye exam performed by an eye specialist is recommended every 1-2 years to screen for glaucoma; cataracts, macular degeneration, and other eye disorders.  A preventive dental visit is recommended every 6 months.  Try to get at least 150 minutes of exercise per week or 10,000 steps per day on a pedometer .  Order or download the FREE \"Exercise & Physical Activity: Your Everyday Guide\" from [employer]. Call 1-913.684.4176 or search [employer] online.  You need 1258-3355 mg of calcium and 3497-6971 IU of vitamin D per day. It is possible to meet your calcium requirement with diet alone, but a vitamin D supplement is usually necessary to meet this goal.  When exposed to the sun, use a sunscreen that protects against both UVA and UVB radiation with an SPF of 30 or greater. Reapply every 2 to 3 hours or after sweating, drying off with a towel, or swimming.  Always wear a seat belt when traveling in a car. Always wear a helmet when riding a bicycle or motorcycle.

## 2024-08-15 LAB
ALBUMIN SERPL-MCNC: 4.1 G/DL (ref 3.9–4.9)
ALP SERPL-CCNC: 108 IU/L (ref 44–121)
ALT SERPL-CCNC: 13 IU/L (ref 0–32)
AST SERPL-CCNC: 11 IU/L (ref 0–40)
BASOPHILS # BLD AUTO: 0 X10E3/UL (ref 0–0.2)
BASOPHILS NFR BLD AUTO: 0 %
BILIRUB SERPL-MCNC: 0.7 MG/DL (ref 0–1.2)
BUN SERPL-MCNC: 12 MG/DL (ref 8–27)
BUN/CREAT SERPL: 18 (ref 12–28)
CALCIUM SERPL-MCNC: 9.4 MG/DL (ref 8.7–10.3)
CHLORIDE SERPL-SCNC: 102 MMOL/L (ref 96–106)
CHOLEST SERPL-MCNC: 179 MG/DL (ref 100–199)
CO2 SERPL-SCNC: 24 MMOL/L (ref 20–29)
CREAT SERPL-MCNC: 0.68 MG/DL (ref 0.57–1)
EGFRCR SERPLBLD CKD-EPI 2021: 95 ML/MIN/1.73
EOSINOPHIL # BLD AUTO: 0.1 X10E3/UL (ref 0–0.4)
EOSINOPHIL NFR BLD AUTO: 1 %
ERYTHROCYTE [DISTWIDTH] IN BLOOD BY AUTOMATED COUNT: 15.5 % (ref 11.7–15.4)
GLOBULIN SER CALC-MCNC: 2.9 G/DL (ref 1.5–4.5)
GLUCOSE SERPL-MCNC: 90 MG/DL (ref 70–99)
HBA1C MFR BLD: 7.5 % (ref 4.8–5.6)
HCT VFR BLD AUTO: 39.2 % (ref 34–46.6)
HDLC SERPL-MCNC: 71 MG/DL
HGB BLD-MCNC: 12.6 G/DL (ref 11.1–15.9)
IMM GRANULOCYTES # BLD AUTO: 0 X10E3/UL (ref 0–0.1)
IMM GRANULOCYTES NFR BLD AUTO: 0 %
LDLC SERPL CALC-MCNC: 94 MG/DL (ref 0–99)
LYMPHOCYTES # BLD AUTO: 2.2 X10E3/UL (ref 0.7–3.1)
LYMPHOCYTES NFR BLD AUTO: 32 %
MCH RBC QN AUTO: 28.7 PG (ref 26.6–33)
MCHC RBC AUTO-ENTMCNC: 32.1 G/DL (ref 31.5–35.7)
MCV RBC AUTO: 89 FL (ref 79–97)
MONOCYTES # BLD AUTO: 0.5 X10E3/UL (ref 0.1–0.9)
MONOCYTES NFR BLD AUTO: 7 %
NEUTROPHILS # BLD AUTO: 4.1 X10E3/UL (ref 1.4–7)
NEUTROPHILS NFR BLD AUTO: 60 %
PLATELET # BLD AUTO: 379 X10E3/UL (ref 150–450)
POTASSIUM SERPL-SCNC: 3.9 MMOL/L (ref 3.5–5.2)
PROT SERPL-MCNC: 7 G/DL (ref 6–8.5)
RBC # BLD AUTO: 4.39 X10E6/UL (ref 3.77–5.28)
SODIUM SERPL-SCNC: 140 MMOL/L (ref 134–144)
T4 FREE SERPL-MCNC: 1.05 NG/DL (ref 0.82–1.77)
TRIGL SERPL-MCNC: 73 MG/DL (ref 0–149)
TSH SERPL DL<=0.005 MIU/L-ACNC: 1.74 UIU/ML (ref 0.45–4.5)
VLDLC SERPL CALC-MCNC: 14 MG/DL (ref 5–40)
WBC # BLD AUTO: 6.9 X10E3/UL (ref 3.4–10.8)

## 2024-08-18 DIAGNOSIS — I10 ESSENTIAL (PRIMARY) HYPERTENSION: ICD-10-CM

## 2024-08-18 DIAGNOSIS — I69.354 HEMIPLEGIA AND HEMIPARESIS FOLLOWING CEREBRAL INFARCTION AFFECTING LEFT NON-DOMINANT SIDE (HCC): ICD-10-CM

## 2024-08-18 DIAGNOSIS — E78.00 PURE HYPERCHOLESTEROLEMIA, UNSPECIFIED: ICD-10-CM

## 2024-08-18 LAB
GAMMA INTERFERON BACKGROUND BLD IA-ACNC: 0.08 IU/ML
M TB IFN-G BLD-IMP: NEGATIVE
M TB IFN-G CD4+ BCKGRND COR BLD-ACNC: 0.06 IU/ML
M TB IFN-G CD4+CD8+ BCKGRND COR BLD-ACNC: 0.05 IU/ML
MITOGEN IGNF BCKGRD COR BLD-ACNC: >10 IU/ML
SERVICE CMNT-IMP: NORMAL

## 2024-08-20 NOTE — TELEPHONE ENCOUNTER
Last appointment: 8/14/24  Next appointment: 11/14/24  Previous refill encounter(s): 3/1/24 Zocor, Lopressor, Catapres 90 d/s with 1 refill, 11/10/23 Norvasc #90 with 3 refills    Requested Prescriptions     Pending Prescriptions Disp Refills    amLODIPine (NORVASC) 10 MG tablet [Pharmacy Med Name: AMLODIPINE BESYLATE 10 MG TAB] 90 tablet 1     Sig: TAKE 1 TABLET BY MOUTH EVERY DAY FOR HIGH BLOOD PRESSURE    metoprolol tartrate (LOPRESSOR) 25 MG tablet [Pharmacy Med Name: METOPROLOL TARTRATE 25 MG TAB] 180 tablet 1     Sig: TAKE 1 TABLET BY MOUTH TWICE A DAY    simvastatin (ZOCOR) 10 MG tablet [Pharmacy Med Name: SIMVASTATIN 10 MG TABLET] 90 tablet 1     Sig: TAKE 1 TABLET BY MOUTH EVERY DAY AT NIGHT    cloNIDine (CATAPRES) 0.2 MG tablet [Pharmacy Med Name: CLONIDINE HCL 0.2 MG TABLET] 90 tablet 1     Sig: TAKE 1 TABLET BY MOUTH EVERY DAY AT NIGHT         For Pharmacy Admin Tracking Only    Program: Medication Refill  CPA in place:    Recommendation Provided To:   Intervention Detail: New Rx: 4, reason: Patient Preference  Intervention Accepted By:   Gap Closed?:    Time Spent (min): 5

## 2024-08-21 RX ORDER — ADALIMUMAB 40MG/0.4ML
KIT SUBCUTANEOUS
Qty: 2 EACH | Refills: 3 | Status: ACTIVE | OUTPATIENT
Start: 2024-08-21

## 2024-08-21 NOTE — TELEPHONE ENCOUNTER
Last office visit 7/30/2024  Next office visit 10/29/2024  Lab Results   Component Value Date    WBC 6.9 08/14/2024    HGB 12.6 08/14/2024    HCT 39.2 08/14/2024    MCV 89 08/14/2024     08/14/2024     Lab Results   Component Value Date     08/14/2024    K 3.9 08/14/2024     08/14/2024    CO2 24 08/14/2024    BUN 12 08/14/2024    CREATININE 0.68 08/14/2024    GLUCOSE 90 08/14/2024    CALCIUM 9.4 08/14/2024    BILITOT 0.7 08/14/2024    ALKPHOS 108 08/14/2024    AST 11 08/14/2024    ALT 13 08/14/2024    LABGLOM 95 08/14/2024    GFRAA >60 08/02/2022    AGRATIO 1.4 04/25/2024    GLOB 3.6 05/24/2023

## 2024-08-23 RX ORDER — AMLODIPINE BESYLATE 10 MG/1
TABLET ORAL
Qty: 90 TABLET | Refills: 1 | Status: SHIPPED | OUTPATIENT
Start: 2024-08-23

## 2024-08-23 RX ORDER — SIMVASTATIN 10 MG
TABLET ORAL
Qty: 90 TABLET | Refills: 1 | Status: SHIPPED | OUTPATIENT
Start: 2024-08-23

## 2024-08-23 RX ORDER — CLONIDINE HYDROCHLORIDE 0.2 MG/1
0.2 TABLET ORAL
Qty: 90 TABLET | Refills: 1 | Status: SHIPPED | OUTPATIENT
Start: 2024-08-23

## 2024-10-15 ENCOUNTER — TELEPHONE (OUTPATIENT)
Age: 69
End: 2024-10-15

## 2024-10-15 NOTE — TELEPHONE ENCOUNTER
Rohit from [facility] called and requested a refill on the PT's Vitamin D2 which was originally prescribed by . PT was seen 7/30/24 and had CBC and CMP done on 8/14/24 with  Harsah Humphries MD     P:[phone]    F:[phone]

## 2024-10-18 DIAGNOSIS — E55.9 VITAMIN D DEFICIENCY, UNSPECIFIED: ICD-10-CM

## 2024-10-18 DIAGNOSIS — M05.9 RHEUMATOID ARTHRITIS WITH RHEUMATOID FACTOR, UNSPECIFIED (HCC): ICD-10-CM

## 2024-10-18 NOTE — TELEPHONE ENCOUNTER
Last visit 07/30/24  Lab Results   Component Value Date     08/14/2024    K 3.9 08/14/2024     08/14/2024    CO2 24 08/14/2024    BUN 12 08/14/2024    CREATININE 0.68 08/14/2024    GLUCOSE 90 08/14/2024    CALCIUM 9.4 08/14/2024    BILITOT 0.7 08/14/2024    ALKPHOS 108 08/14/2024    AST 11 08/14/2024    ALT 13 08/14/2024    LABGLOM 95 08/14/2024    GFRAA >60 08/02/2022    AGRATIO 1.4 04/25/2024    GLOB 3.6 05/24/2023     Lab Results   Component Value Date    WBC 6.9 08/14/2024    HGB 12.6 08/14/2024    HCT 39.2 08/14/2024    MCV 89 08/14/2024     08/14/2024

## 2024-10-21 RX ORDER — ERGOCALCIFEROL 1.25 MG/1
50000 CAPSULE, LIQUID FILLED ORAL WEEKLY
Qty: 12 CAPSULE | Refills: 3 | Status: SHIPPED | OUTPATIENT
Start: 2024-10-21

## 2024-10-31 RX ORDER — METHOTREXATE 2.5 MG/1
10 TABLET ORAL WEEKLY
Qty: 48 TABLET | Refills: 1 | Status: SHIPPED | OUTPATIENT
Start: 2024-10-31 | End: 2024-11-01 | Stop reason: SDUPTHER

## 2024-10-31 RX ORDER — METHOTREXATE 2.5 MG/1
TABLET ORAL
Qty: 48 TABLET | Refills: 1 | Status: SHIPPED | OUTPATIENT
Start: 2024-10-31

## 2024-10-31 RX ORDER — PREDNISONE 5 MG/1
5 TABLET ORAL 2 TIMES DAILY
Qty: 60 TABLET | Refills: 1 | Status: SHIPPED | OUTPATIENT
Start: 2024-10-31

## 2024-11-01 NOTE — TELEPHONE ENCOUNTER
PT called and requested a refill on Methotrexate. PT stated that she had to cancel her last appt due to transportation and now her medicine has run out. PT was seen 7/30/24 and had labs on 8/14/24 ordered by     Harsha Humphries MD   PT requesting call back regarding this

## 2024-11-03 RX ORDER — METHOTREXATE 2.5 MG/1
10 TABLET ORAL WEEKLY
Qty: 48 TABLET | Refills: 1 | Status: SHIPPED | OUTPATIENT
Start: 2024-11-03

## 2024-11-14 ENCOUNTER — OFFICE VISIT (OUTPATIENT)
Age: 69
End: 2024-11-14
Payer: MEDICARE

## 2024-11-14 VITALS
DIASTOLIC BLOOD PRESSURE: 87 MMHG | TEMPERATURE: 97.7 F | OXYGEN SATURATION: 95 % | HEART RATE: 90 BPM | RESPIRATION RATE: 16 BRPM | SYSTOLIC BLOOD PRESSURE: 135 MMHG | BODY MASS INDEX: 22.65 KG/M2 | WEIGHT: 116 LBS

## 2024-11-14 DIAGNOSIS — I69.359 CVA, OLD, HEMIPARESIS (HCC): Primary | ICD-10-CM

## 2024-11-14 DIAGNOSIS — Z23 ENCOUNTER FOR IMMUNIZATION: ICD-10-CM

## 2024-11-14 DIAGNOSIS — E11.40 TYPE 2 DIABETES MELLITUS WITH DIABETIC NEUROPATHY, WITHOUT LONG-TERM CURRENT USE OF INSULIN (HCC): ICD-10-CM

## 2024-11-14 DIAGNOSIS — D84.9 IMMUNODEFICIENCY, UNSPECIFIED (HCC): ICD-10-CM

## 2024-11-14 LAB — HBA1C MFR BLD: 6.7 %

## 2024-11-14 PROCEDURE — 90653 IIV ADJUVANT VACCINE IM: CPT | Performed by: FAMILY MEDICINE

## 2024-11-14 PROCEDURE — 99213 OFFICE O/P EST LOW 20 MIN: CPT | Performed by: FAMILY MEDICINE

## 2024-11-14 PROCEDURE — 83036 HEMOGLOBIN GLYCOSYLATED A1C: CPT | Performed by: FAMILY MEDICINE

## 2024-11-14 RX ORDER — FOLIC ACID 1 MG/1
1 TABLET ORAL DAILY
Qty: 30 TABLET | Refills: 5
Start: 2024-11-14

## 2024-11-14 ASSESSMENT — PATIENT HEALTH QUESTIONNAIRE - PHQ9
2. FEELING DOWN, DEPRESSED OR HOPELESS: NOT AT ALL
SUM OF ALL RESPONSES TO PHQ QUESTIONS 1-9: 0
1. LITTLE INTEREST OR PLEASURE IN DOING THINGS: NOT AT ALL
SUM OF ALL RESPONSES TO PHQ QUESTIONS 1-9: 0
SUM OF ALL RESPONSES TO PHQ9 QUESTIONS 1 & 2: 0
SUM OF ALL RESPONSES TO PHQ QUESTIONS 1-9: 0
SUM OF ALL RESPONSES TO PHQ QUESTIONS 1-9: 0

## 2024-11-14 NOTE — ASSESSMENT & PLAN NOTE
Chronic, at goal (stable), continue current plan pending work up below, medication adherence emphasized, and lifestyle modifications recommended    Orders:    Microalbumin / Creatinine Urine Ratio; Future     DIABETES FOOT EXAM    folic acid (FOLVITE) 1 MG tablet; Take 1 tablet by mouth daily    AMB POC HEMOGLOBIN A1C

## 2024-11-14 NOTE — PROGRESS NOTES
Monika Mullins (:  1955) is a 69 y.o. female,Established patient, here for evaluation of the following chief complaint(s):  Diabetes (Follow up )  who present for f/u regarding the diabetic state, and bp check,  patient has been compliant with diabetic meds since last visit and is trying to have a diabetic diet, no Rf needed for today, patient currently denies tingling sensation, has no polyurea and polydipsia, last a1c was not at target,     Constitutional: no fever, nad     HENT: no ear pain or nosebleeds. No blurred vision  Respiratory: no shortness of breath, wheezing cough   Cardiovascular: Has no chest pain, ,and racing heart .   Gastrointestinal: No constipation, diarrhea, nausea and vomiting.   Genitourinary: No frequency.   Musculoskeletal: Negative for joint pain.   Skin: no itching, no rash.   Neurological: Negative for dizziness, no tremors  Psychiatric/Behavioral: no for depression  no nervous/anxious, nl stress levels, and overall emotional well-being .        Constitutional: Well developed, well nourished.  non-toxic in appearance, not diaphoretic.   HEENT: PERRL. EOMI. The left TM is unremarkable. The right TM is unremarkable. No nasal  erythema noted.  THROAT: Posterior pharynx has no erythema, no exudates.    Neck:  no cervical lymphadenopathy. Neck is supple   Cardiovascular: Regular rate and rhythm, no murmurs, rubs, or gallops.   Pulmonary: Clear to auscultation bilaterally. Has no wheezing, rales or rhonchi.,  speaking in full sentences, has no accessory muscle used.  Abdomen: Bowel sounds are normal. Having no distension, no palpable mass. Soft,  No tenderness, rebound or guarding.   Musculoskeletal: No peripheral edema, having normal ROM  Skin:   warm and dry. No diaphoresis, rashes, or lesions.   Neurological: Alert, awake,  oriented x3 ,  normal speech.       Assessment & Plan  CVA, old, hemiparesis (HCC)   Chronic, at goal (stable), continue current plan pending work up

## 2024-11-14 NOTE — PROGRESS NOTES
Chief Complaint   Patient presents with    Back Pain       \"Have you been to the ER, urgent care clinic since your last visit?  Hospitalized since your last visit?\"    NO    “Have you seen or consulted any other health care providers outside of [facility] since your last visit?”    NO            Click Here for Release of Records Request     No results found for this visit on 24.   Vitals:    24 1449   BP: 135/87   Pulse: 90   Resp: 16   Temp: 97.7 °F (36.5 °C)   TempSrc: Infrared   SpO2: 95%   Weight: 52.6 kg (116 lb)      Health Maintenance Due   Topic Date Due    Diabetic retinal exam  Never done    Diabetic Alb to Cr ratio (uACR) test  2020    Breast cancer screen  2024    Diabetic foot exam  2024        The patient, Monika Mullins, identity was verified by name and .

## 2024-11-14 NOTE — PATIENT INSTRUCTIONS
Patient Education        influenza virus vaccine (injection)  Pronunciation:  IN marcko EN jessica HURTADO seen  Brand:  Afluria PF Pediatric Quadrivalent [id number], Afluria PF Quadrivalent [id number], Afluria Quadrivalent [id number], Fluarix PF Quadrivalent 7417-7517, Flublok Quadrivalent [id number], Flucelvax PF Quadrivalent 2857-7735, Flucelvax Quadrivalent 8782-1541, FluLaval PF Quadrivalent [id number], Fluzone High-Dose Quadrivalent PF 8561-8907, Fluzone PF Pediatric Quadrivalent [id number], Fluzone PF Quadrivalent 9966-1660, Fluzone Quadrivalent 1623-2958  What is the most important information I should know about this vaccine?  Influenza virus vaccine is made from \"killed viruses\" and will not cause you to become ill with the flu virus.   What is influenza virus vaccine?  Influenza virus (\"the flu\") is a contagious disease caused by a virus that can spread from one person to another through the air or on surfaces. Flu symptoms include fever, chills, tiredness, aches, sore throat, cough, vomiting, and diarrhea. The flu can also cause sinus infections, ear infections, bronchitis, or serious complications such as pneumonia.  Influenza causes thousands of deaths each year, and hundreds of thousands of hospitalizations. Influenza is most dangerous in children, pregnant women, older adults, and people with weak immune systems or health problems such as diabetes, heart disease, or cancer.  Influenza virus vaccine is for use in adults and children at least 6 months old, to prevent infection caused by influenza virus. This vaccine helps your body develop immunity to the disease, but will not treat an active infection you already have.  Influenza virus vaccine is redeveloped each year to contain specific strains of inactivated (killed) flu virus that are recommended by public health officials for that year.  The injectable influenza virus vaccine (flu shot) is made from \"killed viruses.\" Influenza virus vaccine is also

## 2024-11-14 NOTE — ASSESSMENT & PLAN NOTE
Chronic, not at goal (unstable), continue current treatment plan, medication adherence emphasized, and lifestyle modifications recommended    Orders:    Microalbumin / Creatinine Urine Ratio; Future    HM DIABETES FOOT EXAM    folic acid (FOLVITE) 1 MG tablet; Take 1 tablet by mouth daily    AMB POC HEMOGLOBIN A1C

## 2024-11-18 ENCOUNTER — OFFICE VISIT (OUTPATIENT)
Age: 69
End: 2024-11-18

## 2024-11-18 VITALS
RESPIRATION RATE: 16 BRPM | WEIGHT: 116 LBS | TEMPERATURE: 99.7 F | DIASTOLIC BLOOD PRESSURE: 78 MMHG | OXYGEN SATURATION: 97 % | HEART RATE: 96 BPM | SYSTOLIC BLOOD PRESSURE: 160 MMHG | BODY MASS INDEX: 22.65 KG/M2

## 2024-11-18 DIAGNOSIS — B02.9 HERPES ZOSTER WITHOUT COMPLICATION: Primary | ICD-10-CM

## 2024-11-18 RX ORDER — VALACYCLOVIR HYDROCHLORIDE 500 MG/1
500 TABLET, FILM COATED ORAL 2 TIMES DAILY
Qty: 10 TABLET | Refills: 0 | Status: SHIPPED | OUTPATIENT
Start: 2024-11-18

## 2024-11-18 RX ORDER — ACYCLOVIR 50 MG/G
OINTMENT TOPICAL
Qty: 30 G | Refills: 0 | Status: SHIPPED | OUTPATIENT
Start: 2024-11-18 | End: 2024-11-25

## 2024-11-18 RX ORDER — IBUPROFEN 800 MG/1
800 TABLET, FILM COATED ORAL EVERY 8 HOURS PRN
Qty: 60 TABLET | Refills: 0 | Status: SHIPPED | OUTPATIENT
Start: 2024-11-18

## 2024-11-18 NOTE — PATIENT INSTRUCTIONS
Discussed with patient this appears to be shingles discussed can start Valtrex right away and take twice a day for the next 10 days to help prevent worsening symptoms discussed can also use acyclovir cream on rash has well has take ibuprofen with food every 8 hours to help with pain discussed with patient some individuals can develop a postherpetic neuralgia pain which is pain even when rash has resolved discussed to make sure to follow-up with PCP has may need additional pain medication or nerve medication to help also discussed with patient can be highly contagious and to make sure to avoid pregnant women and children elderly or those that may be immunocompromised

## 2024-11-18 NOTE — PROGRESS NOTES
Monika Mullins (:  1955) is a 69 y.o. female,New patient, here for evaluation of the following chief complaint(s):  Rash (C/o rash. Sx 3 days. Sx consist of itching.)          ASSESSMENT/PLAN:    Assessment & Plan  Herpes zoster without complication             Discussed with patient this appears to be shingles discussed can start Valtrex right away and take twice a day for the next 10 days to help prevent worsening symptoms discussed can also use acyclovir cream on rash has well has take ibuprofen with food every 8 hours to help with pain discussed with patient some individuals can develop a postherpetic neuralgia pain which is pain even when rash has resolved discussed to make sure to follow-up with PCP has may need additional pain medication or nerve medication to help also discussed with patient can be highly contagious and to make sure to avoid pregnant women and children elderly or those that may be immunocompromised. Discussed with patient BP is elevated, patient took antihypertensives today, discussed could be r/t pain but repeat BP at home next few days and if continues to be above 140/80 to return to PCP for further evaluation.     I have discussed the results, diagnosis and treatment plan with the patient. The patient also understands that early in the process of an illness, an urgent care workup can be falsely reassuring. Routine discharge counseling and specific return precautions discussed with patient and the patient understands that worsening, changing or persistent symptoms should prompt an immediate return to the urgent care or emergency department. Patient/Guardian expressed understanding and agrees with the discharge plan. No further questions at time of discharge.     SUBJECTIVE/OBJECTIVE:  69 y.o. female presents with symptoms of Rash (C/o rash. Sx 3 days. Sx consist of itching.)      69-year-old female presents to urgent care stating she broke out with a rash on her back it is

## 2024-11-22 RX ORDER — ADALIMUMAB 40MG/0.4ML
40 KIT SUBCUTANEOUS
Qty: 2 EACH | Refills: 3 | Status: ACTIVE | OUTPATIENT
Start: 2024-11-22

## 2025-01-15 RX ORDER — FOLIC ACID 1 MG/1
1000 TABLET ORAL DAILY
Qty: 30 TABLET | Refills: 5 | OUTPATIENT
Start: 2025-01-15

## 2025-02-13 ENCOUNTER — TELEPHONE (OUTPATIENT)
Age: 70
End: 2025-02-13

## 2025-02-13 NOTE — TELEPHONE ENCOUNTER
Attempted to contact patient regarding upcoming Medicare wellness appointment and completion of HRA questionnaire. When trying to call number on file received message \"number has calling restrictions and cannot complete call\".

## 2025-02-18 ENCOUNTER — OFFICE VISIT (OUTPATIENT)
Age: 70
End: 2025-02-18

## 2025-02-18 VITALS
TEMPERATURE: 97.3 F | OXYGEN SATURATION: 96 % | HEIGHT: 60 IN | HEART RATE: 75 BPM | DIASTOLIC BLOOD PRESSURE: 89 MMHG | SYSTOLIC BLOOD PRESSURE: 139 MMHG | WEIGHT: 114 LBS | BODY MASS INDEX: 22.38 KG/M2 | RESPIRATION RATE: 16 BRPM

## 2025-02-18 DIAGNOSIS — I10 HTN, GOAL BELOW 130/80: ICD-10-CM

## 2025-02-18 DIAGNOSIS — E11.40 TYPE 2 DIABETES MELLITUS WITH DIABETIC NEUROPATHY, WITHOUT LONG-TERM CURRENT USE OF INSULIN (HCC): ICD-10-CM

## 2025-02-18 DIAGNOSIS — Z12.31 ENCOUNTER FOR SCREENING MAMMOGRAM FOR BREAST CANCER: ICD-10-CM

## 2025-02-18 DIAGNOSIS — M05.9 RHEUMATOID ARTHRITIS WITH RHEUMATOID FACTOR, UNSPECIFIED (HCC): ICD-10-CM

## 2025-02-18 DIAGNOSIS — I69.359 CVA, OLD, HEMIPARESIS (HCC): ICD-10-CM

## 2025-02-18 DIAGNOSIS — E55.9 VITAMIN D DEFICIENCY, UNSPECIFIED: ICD-10-CM

## 2025-02-18 DIAGNOSIS — Z00.00 MEDICARE ANNUAL WELLNESS VISIT, SUBSEQUENT: Primary | ICD-10-CM

## 2025-02-18 DIAGNOSIS — K51.018 ULCERATIVE PANCOLITIS WITH OTHER COMPLICATION (HCC): ICD-10-CM

## 2025-02-18 RX ORDER — ERGOCALCIFEROL 1.25 MG/1
50000 CAPSULE, LIQUID FILLED ORAL WEEKLY
Qty: 12 CAPSULE | Refills: 3 | Status: SHIPPED | OUTPATIENT
Start: 2025-02-18

## 2025-02-18 SDOH — ECONOMIC STABILITY: FOOD INSECURITY: WITHIN THE PAST 12 MONTHS, THE FOOD YOU BOUGHT JUST DIDN'T LAST AND YOU DIDN'T HAVE MONEY TO GET MORE.: NEVER TRUE

## 2025-02-18 SDOH — ECONOMIC STABILITY: FOOD INSECURITY: WITHIN THE PAST 12 MONTHS, YOU WORRIED THAT YOUR FOOD WOULD RUN OUT BEFORE YOU GOT MONEY TO BUY MORE.: NEVER TRUE

## 2025-02-18 ASSESSMENT — PATIENT HEALTH QUESTIONNAIRE - PHQ9
SUM OF ALL RESPONSES TO PHQ QUESTIONS 1-9: 0
SUM OF ALL RESPONSES TO PHQ QUESTIONS 1-9: 0
1. LITTLE INTEREST OR PLEASURE IN DOING THINGS: NOT AT ALL
SUM OF ALL RESPONSES TO PHQ QUESTIONS 1-9: 0
SUM OF ALL RESPONSES TO PHQ QUESTIONS 1-9: 0
SUM OF ALL RESPONSES TO PHQ9 QUESTIONS 1 & 2: 0
2. FEELING DOWN, DEPRESSED OR HOPELESS: NOT AT ALL

## 2025-02-18 NOTE — PROGRESS NOTES
Chief Complaint   Patient presents with    Medicare AWV    Medication Refill       \"Have you been to the ER, urgent care clinic since your last visit?  Hospitalized since your last visit?\"    NO    “Have you seen or consulted any other health care providers outside of [facility] since your last visit?”    NO    Have you had a mammogram?”   YES    Date of last Mammogram: 2022             Click Here for Release of Records Request     No results found for this visit on 25.   Vitals:    25 1442   BP: 139/89   Pulse: 75   Resp: 16   Temp: 97.3 °F (36.3 °C)   TempSrc: Infrared   SpO2: 96%   Weight: 51.7 kg (114 lb)   Height: 1.524 m (5')          The patient, Monika Mullins, identity was verified by name and .   
.   Gastrointestinal: No constipation, diarrhea, nausea and vomiting.   Genitourinary: No frequency.   Musculoskeletal: Negative for joint pain.   Skin: no itching, no rash.  upper extremity weaknesses worse than lower extremity decreased feelings  Neurological:  ++ for light dizziness, minor tremors  Psychiatric/Behavioral: ++ for depression   is ++nervous/anxious, denies suicidal homicidal ideation.                  Objective   Vitals:    02/18/25 1442   BP: 139/89   Pulse: 75   Resp: 16   Temp: 97.3 °F (36.3 °C)   TempSrc: Infrared   SpO2: 96%   Weight: 51.7 kg (114 lb)   Height: 1.524 m (5')      Body mass index is 22.26 kg/m².     General:  alert cooperative appears stated for the age, oriented x3, normal speech  HEENT; normocephalic and atraumatic PERRLA extraocular motor intact normal tympanic membrane normal external ear bilaterally, mucosal normal no drainage  Neck: supple no adenopathy no JVD no bruit  Lungs: Clear to auscultation bilaterally no rales rhonchi or wheezes  Heart: Normal regular S1-S2 ,  no murmur  Breast exam deferred  Abdomen: Soft nontender normal bowel sounds   Extremities: abnormal range of motion,++upper extremity greater than lower extremity paralysis decreased range of motion decreased sensation and decreased motor function no point tenderness normal pulses no edema  Pelvic/: No lesion, no lymphadenopathy  Skin: Normal no lesion no rash                   Allergies   Allergen Reactions    Infliximab Angioedema     2/2023 infusion -> lip swelling    Lisinopril Swelling    Codeine Nausea And Vomiting and Other (See Comments)     Prior to Visit Medications    Medication Sig Taking? Authorizing Provider   adalimumab (HUMIRA, 2 PEN,) 40 MG/0.4ML AJKT injection pen kit Inject 0.4 mLs into the skin every 7 days Yes Sonia Downs MD   valACYclovir (VALTREX) 500 MG tablet Take 1 tablet by mouth 2 times daily Yes Claudia Iraheta, APRN - CNP   ibuprofen (ADVIL;MOTRIN) 800 MG tablet Take 1

## 2025-02-18 NOTE — PATIENT INSTRUCTIONS
feel dizzy after you take medicine, avoid activity at that time. Try being active before you take your medicine. This will reduce your risk of falls.  If you plan to be active at home, make sure to clear your space before you get started. Remove things like TV cords, coffee tables, and throw rugs. It's safest to have plenty of space to move freely.  The key to getting more active is to take it slow and steady. Try to improve only a little bit at a time. Pick just one area to improve on at first. And if an activity hurts, stop and talk to your doctor.  Where can you learn more?  Go to https://www.alive.cn.net/patientEd and enter P600 to learn more about \"Learning About Being Active as an Older Adult.\"  Current as of: July 31, 2024  Content Version: 14.3  © 2024 [employer].   Care instructions adapted under license by [employer]. If you have questions about a medical condition or this instruction, always ask your healthcare professional. [employer], disclaims any warranty or liability for your use of this information.         Learning About Vision Tests  What are vision tests?     The four most common vision tests are visual acuity tests, refraction, visual field tests, and color vision tests.  Visual acuity (sharpness) tests  These tests are used:  To see if you need glasses or contact lenses.  To monitor an eye problem.  To check an eye injury.  Visual acuity tests are done as part of routine exams. You may also have this test when you get your 's license or apply for some types of jobs.  Visual field tests  These tests are used:  To check for vision loss in any area of your range of vision.  To screen for certain eye diseases.  To look for nerve damage after a stroke, head injury, or other problem that could reduce blood flow to the brain.  Refraction and color tests  A refraction test is done to find the right prescription for glasses and contact lenses.  A color vision test is

## 2025-02-19 LAB
ERYTHROCYTE [DISTWIDTH] IN BLOOD BY AUTOMATED COUNT: 13.2 % (ref 11.7–15.4)
HBA1C MFR BLD: 7.4 % (ref 4.8–5.6)
HCT VFR BLD AUTO: 41.4 % (ref 34–46.6)
HGB BLD-MCNC: 13.7 G/DL (ref 11.1–15.9)
MCH RBC QN AUTO: 31.6 PG (ref 26.6–33)
MCHC RBC AUTO-ENTMCNC: 33.1 G/DL (ref 31.5–35.7)
MCV RBC AUTO: 95 FL (ref 79–97)
PLATELET # BLD AUTO: 315 X10E3/UL (ref 150–450)
RBC # BLD AUTO: 4.34 X10E6/UL (ref 3.77–5.28)
WBC # BLD AUTO: 6.1 X10E3/UL (ref 3.4–10.8)

## 2025-02-20 LAB
ALBUMIN SERPL-MCNC: 4.4 G/DL (ref 3.9–4.9)
ALP SERPL-CCNC: 124 IU/L (ref 44–121)
ALT SERPL-CCNC: 16 IU/L (ref 0–32)
AST SERPL-CCNC: 16 IU/L (ref 0–40)
BILIRUB SERPL-MCNC: 0.9 MG/DL (ref 0–1.2)
BUN SERPL-MCNC: 12 MG/DL (ref 8–27)
BUN/CREAT SERPL: 18 (ref 12–28)
CALCIUM SERPL-MCNC: 9.4 MG/DL (ref 8.7–10.3)
CHLORIDE SERPL-SCNC: 103 MMOL/L (ref 96–106)
CHOLEST SERPL-MCNC: 167 MG/DL (ref 100–199)
CO2 SERPL-SCNC: 22 MMOL/L (ref 20–29)
CREAT SERPL-MCNC: 0.66 MG/DL (ref 0.57–1)
EGFRCR SERPLBLD CKD-EPI 2021: 96 ML/MIN/1.73
GLOBULIN SER CALC-MCNC: 2.4 G/DL (ref 1.5–4.5)
GLUCOSE SERPL-MCNC: 93 MG/DL (ref 70–99)
HDLC SERPL-MCNC: 67 MG/DL
LDLC SERPL CALC-MCNC: 88 MG/DL (ref 0–99)
POTASSIUM SERPL-SCNC: 4.2 MMOL/L (ref 3.5–5.2)
PROT SERPL-MCNC: 6.8 G/DL (ref 6–8.5)
SODIUM SERPL-SCNC: 142 MMOL/L (ref 134–144)
T4 FREE SERPL-MCNC: 1 NG/DL (ref 0.82–1.77)
TRIGL SERPL-MCNC: 58 MG/DL (ref 0–149)
TSH SERPL DL<=0.005 MIU/L-ACNC: 1.65 UIU/ML (ref 0.45–4.5)
VLDLC SERPL CALC-MCNC: 12 MG/DL (ref 5–40)

## 2025-02-26 DIAGNOSIS — E78.00 PURE HYPERCHOLESTEROLEMIA, UNSPECIFIED: ICD-10-CM

## 2025-02-26 DIAGNOSIS — I69.354 HEMIPLEGIA AND HEMIPARESIS FOLLOWING CEREBRAL INFARCTION AFFECTING LEFT NON-DOMINANT SIDE (HCC): ICD-10-CM

## 2025-02-26 RX ORDER — SIMVASTATIN 10 MG
TABLET ORAL
Qty: 90 TABLET | Refills: 1 | Status: SHIPPED | OUTPATIENT
Start: 2025-02-26

## 2025-02-26 RX ORDER — CLONIDINE HYDROCHLORIDE 0.2 MG/1
0.2 TABLET ORAL
Qty: 90 TABLET | Refills: 1 | Status: SHIPPED | OUTPATIENT
Start: 2025-02-26

## 2025-02-26 NOTE — TELEPHONE ENCOUNTER
Last appointment: 2/18/25  Next appointment: 8/19/25  Previous refill encounter(s): 8/23/24 #90 with 1 refill    Requested Prescriptions     Pending Prescriptions Disp Refills    simvastatin (ZOCOR) 10 MG tablet [Pharmacy Med Name: SIMVASTATIN 10 MG TABLET] 90 tablet 1     Sig: TAKE 1 TABLET BY MOUTH EVERY DAY AT NIGHT    cloNIDine (CATAPRES) 0.2 MG tablet [Pharmacy Med Name: CLONIDINE HCL 0.2 MG TABLET] 90 tablet 1     Sig: TAKE 1 TABLET BY MOUTH EVERY DAY AT NIGHT         For Pharmacy Admin Tracking Only    Program: Medication Refill  CPA in place:    Recommendation Provided To:   Intervention Detail: New Rx: 2, reason: Patient Preference  Intervention Accepted By:   Gap Closed?:    Time Spent (min): 5

## 2025-05-29 NOTE — TELEPHONE ENCOUNTER
Last appointment: 2/18/25  Next appointment: 8/19/25  Previous refill encounter(s): 8/23/24 #90 with 1 refill    Requested Prescriptions     Pending Prescriptions Disp Refills    amLODIPine (NORVASC) 10 MG tablet [Pharmacy Med Name: AMLODIPINE BESYLATE 10 MG TAB] 90 tablet 1     Sig: TAKE 1 TABLET BY MOUTH EVERY DAY FOR HIGH BLOOD PRESSURE         For Pharmacy Admin Tracking Only    Program: Medication Refill  CPA in place:    Recommendation Provided To:   Intervention Detail: New Rx: 1, reason: Patient Preference  Intervention Accepted By:   Gap Closed?:    Time Spent (min): 5

## 2025-06-02 RX ORDER — AMLODIPINE BESYLATE 10 MG/1
10 TABLET ORAL DAILY
Qty: 90 TABLET | Refills: 1 | Status: SHIPPED | OUTPATIENT
Start: 2025-06-02

## 2025-08-09 DIAGNOSIS — E53.8 DEFICIENCY OF OTHER SPECIFIED B GROUP VITAMINS: ICD-10-CM

## 2025-08-15 RX ORDER — LANOLIN ALCOHOL/MO/W.PET/CERES
1000 CREAM (GRAM) TOPICAL DAILY
Qty: 90 TABLET | Refills: 3 | Status: SHIPPED | OUTPATIENT
Start: 2025-08-15

## 2025-08-19 ENCOUNTER — OFFICE VISIT (OUTPATIENT)
Age: 70
End: 2025-08-19
Payer: MEDICARE

## 2025-08-19 VITALS
WEIGHT: 114 LBS | RESPIRATION RATE: 17 BRPM | SYSTOLIC BLOOD PRESSURE: 108 MMHG | OXYGEN SATURATION: 99 % | DIASTOLIC BLOOD PRESSURE: 80 MMHG | BODY MASS INDEX: 22.26 KG/M2 | TEMPERATURE: 97.3 F | HEART RATE: 75 BPM

## 2025-08-19 DIAGNOSIS — K52.9 CHRONIC DIARRHEA OF UNKNOWN ORIGIN: ICD-10-CM

## 2025-08-19 DIAGNOSIS — Z12.31 ENCOUNTER FOR SCREENING MAMMOGRAM FOR BREAST CANCER: ICD-10-CM

## 2025-08-19 DIAGNOSIS — Z91.81 AT HIGH RISK FOR FALLS: ICD-10-CM

## 2025-08-19 DIAGNOSIS — M05.9 SEROPOSITIVE RHEUMATOID ARTHRITIS (HCC): ICD-10-CM

## 2025-08-19 DIAGNOSIS — E11.40 TYPE 2 DIABETES MELLITUS WITH DIABETIC NEUROPATHY, WITHOUT LONG-TERM CURRENT USE OF INSULIN (HCC): Primary | ICD-10-CM

## 2025-08-19 LAB — HBA1C MFR BLD: 6.8 %

## 2025-08-19 PROCEDURE — 3044F HG A1C LEVEL LT 7.0%: CPT | Performed by: FAMILY MEDICINE

## 2025-08-19 PROCEDURE — 3074F SYST BP LT 130 MM HG: CPT | Performed by: FAMILY MEDICINE

## 2025-08-19 PROCEDURE — 83036 HEMOGLOBIN GLYCOSYLATED A1C: CPT | Performed by: FAMILY MEDICINE

## 2025-08-19 PROCEDURE — 3079F DIAST BP 80-89 MM HG: CPT | Performed by: FAMILY MEDICINE

## 2025-08-19 PROCEDURE — 1123F ACP DISCUSS/DSCN MKR DOCD: CPT | Performed by: FAMILY MEDICINE

## 2025-08-19 PROCEDURE — 99214 OFFICE O/P EST MOD 30 MIN: CPT | Performed by: FAMILY MEDICINE

## 2025-08-19 PROCEDURE — 1126F AMNT PAIN NOTED NONE PRSNT: CPT | Performed by: FAMILY MEDICINE

## 2025-08-19 PROCEDURE — 1160F RVW MEDS BY RX/DR IN RCRD: CPT | Performed by: FAMILY MEDICINE

## 2025-08-19 PROCEDURE — 1159F MED LIST DOCD IN RCRD: CPT | Performed by: FAMILY MEDICINE

## 2025-08-19 PROCEDURE — PBSHW AMB POC HEMOGLOBIN A1C: Performed by: FAMILY MEDICINE

## 2025-08-19 ASSESSMENT — PATIENT HEALTH QUESTIONNAIRE - PHQ9
SUM OF ALL RESPONSES TO PHQ QUESTIONS 1-9: 0
2. FEELING DOWN, DEPRESSED OR HOPELESS: NOT AT ALL
SUM OF ALL RESPONSES TO PHQ QUESTIONS 1-9: 0
1. LITTLE INTEREST OR PLEASURE IN DOING THINGS: NOT AT ALL

## (undated) DEVICE — FCPS BX HOT RJ4 2.2MMX240CM -- RADIAL JAW 4 BX/40

## (undated) DEVICE — REM POLYHESIVE ADULT PATIENT RETURN ELECTRODE: Brand: VALLEYLAB